# Patient Record
Sex: FEMALE | Race: WHITE | Employment: OTHER | URBAN - METROPOLITAN AREA
[De-identification: names, ages, dates, MRNs, and addresses within clinical notes are randomized per-mention and may not be internally consistent; named-entity substitution may affect disease eponyms.]

---

## 2017-01-11 ENCOUNTER — OFFICE VISIT (OUTPATIENT)
Dept: ORTHOPEDIC SURGERY | Age: 62
End: 2017-01-11

## 2017-01-11 VITALS — WEIGHT: 124 LBS | BODY MASS INDEX: 21.17 KG/M2 | HEART RATE: 72 BPM | HEIGHT: 64 IN | RESPIRATION RATE: 16 BRPM

## 2017-01-11 DIAGNOSIS — M19.079 ARTHRITIS OF MIDFOOT: ICD-10-CM

## 2017-01-11 DIAGNOSIS — S92.902K NONUNION OF FOOT FRACTURE, LEFT: Primary | ICD-10-CM

## 2017-01-11 PROCEDURE — 99024 POSTOP FOLLOW-UP VISIT: CPT | Performed by: NURSE PRACTITIONER

## 2017-01-11 PROCEDURE — 73630 X-RAY EXAM OF FOOT: CPT | Performed by: NURSE PRACTITIONER

## 2017-02-24 ENCOUNTER — OFFICE VISIT (OUTPATIENT)
Dept: ORTHOPEDIC SURGERY | Age: 62
End: 2017-02-24

## 2017-02-24 VITALS — BODY MASS INDEX: 21.17 KG/M2 | WEIGHT: 124 LBS | RESPIRATION RATE: 15 BRPM | HEIGHT: 64 IN

## 2017-02-24 DIAGNOSIS — S92.355A CLOSED NONDISPLACED FRACTURE OF FIFTH METATARSAL BONE OF LEFT FOOT, INITIAL ENCOUNTER: Primary | ICD-10-CM

## 2017-02-24 PROCEDURE — 99213 OFFICE O/P EST LOW 20 MIN: CPT | Performed by: ORTHOPAEDIC SURGERY

## 2017-02-24 PROCEDURE — 73630 X-RAY EXAM OF FOOT: CPT | Performed by: ORTHOPAEDIC SURGERY

## 2017-06-01 ENCOUNTER — HOSPITAL ENCOUNTER (OUTPATIENT)
Dept: WOMENS IMAGING | Age: 62
Discharge: HOME | End: 2017-06-01
Attending: SURGERY | Admitting: SURGERY

## 2017-06-01 DIAGNOSIS — M50.30 OTHER CERVICAL DISC DEGENERATION, UNSPECIFIED CERVICAL REGION: ICD-10-CM

## 2017-06-01 DIAGNOSIS — Z12.31 VISIT FOR SCREENING MAMMOGRAM: ICD-10-CM

## 2017-08-16 ENCOUNTER — OFFICE VISIT (OUTPATIENT)
Dept: ORTHOPEDIC SURGERY | Age: 62
End: 2017-08-16

## 2017-08-16 VITALS
HEART RATE: 72 BPM | DIASTOLIC BLOOD PRESSURE: 74 MMHG | SYSTOLIC BLOOD PRESSURE: 115 MMHG | WEIGHT: 124 LBS | HEIGHT: 64 IN | BODY MASS INDEX: 21.17 KG/M2

## 2017-08-16 DIAGNOSIS — M20.22 HALLUX RIGIDUS OF LEFT FOOT: Primary | ICD-10-CM

## 2017-08-16 DIAGNOSIS — M79.672 LEFT FOOT PAIN: ICD-10-CM

## 2017-08-16 PROCEDURE — 20600 DRAIN/INJ JOINT/BURSA W/O US: CPT | Performed by: ORTHOPAEDIC SURGERY

## 2017-08-16 PROCEDURE — 99213 OFFICE O/P EST LOW 20 MIN: CPT | Performed by: ORTHOPAEDIC SURGERY

## 2017-08-20 PROBLEM — M20.22 HALLUX RIGIDUS OF LEFT FOOT: Status: ACTIVE | Noted: 2017-08-20

## 2017-09-18 ENCOUNTER — OFFICE VISIT (OUTPATIENT)
Dept: ORTHOPEDIC SURGERY | Age: 62
End: 2017-09-18

## 2017-09-18 VITALS
DIASTOLIC BLOOD PRESSURE: 74 MMHG | WEIGHT: 124 LBS | HEIGHT: 64 IN | HEART RATE: 81 BPM | BODY MASS INDEX: 21.17 KG/M2 | TEMPERATURE: 99 F | SYSTOLIC BLOOD PRESSURE: 119 MMHG | RESPIRATION RATE: 16 BRPM

## 2017-09-18 DIAGNOSIS — S92.351A JONES FRACTURE, RIGHT, CLOSED, INITIAL ENCOUNTER: ICD-10-CM

## 2017-09-18 DIAGNOSIS — M79.671 RIGHT FOOT PAIN: Primary | ICD-10-CM

## 2017-09-18 PROCEDURE — 99213 OFFICE O/P EST LOW 20 MIN: CPT | Performed by: PHYSICIAN ASSISTANT

## 2017-09-19 ENCOUNTER — OFFICE VISIT (OUTPATIENT)
Dept: ORTHOPEDIC SURGERY | Age: 62
End: 2017-09-19

## 2017-09-19 VITALS
HEIGHT: 64 IN | RESPIRATION RATE: 16 BRPM | SYSTOLIC BLOOD PRESSURE: 135 MMHG | WEIGHT: 124 LBS | HEART RATE: 83 BPM | BODY MASS INDEX: 21.17 KG/M2 | DIASTOLIC BLOOD PRESSURE: 88 MMHG

## 2017-09-19 DIAGNOSIS — S92.354A CLOSED NONDISPLACED FRACTURE OF FIFTH METATARSAL BONE OF RIGHT FOOT, INITIAL ENCOUNTER: Primary | ICD-10-CM

## 2017-09-19 PROBLEM — S99.199A JONES FRACTURE: Status: ACTIVE | Noted: 2017-09-19

## 2017-09-19 PROCEDURE — 99214 OFFICE O/P EST MOD 30 MIN: CPT | Performed by: ORTHOPAEDIC SURGERY

## 2017-09-19 RX ORDER — CEPHALEXIN 500 MG/1
500 CAPSULE ORAL 4 TIMES DAILY
Qty: 20 CAPSULE | Refills: 0 | Status: SHIPPED | OUTPATIENT
Start: 2017-09-19 | End: 2017-10-13 | Stop reason: ALTCHOICE

## 2017-09-19 RX ORDER — HYDROCODONE BITARTRATE AND ACETAMINOPHEN 5; 325 MG/1; MG/1
1 TABLET ORAL EVERY 4 HOURS PRN
Qty: 20 TABLET | Refills: 0 | Status: SHIPPED | OUTPATIENT
Start: 2017-09-19 | End: 2017-10-13 | Stop reason: ALTCHOICE

## 2017-09-21 ENCOUNTER — HOSPITAL ENCOUNTER (OUTPATIENT)
Dept: SURGERY | Age: 62
Discharge: HOME | End: 2017-09-21
Attending: ORTHOPAEDIC SURGERY | Admitting: ORTHOPAEDIC SURGERY

## 2017-09-21 VITALS
WEIGHT: 125.9 LBS | SYSTOLIC BLOOD PRESSURE: 125 MMHG | BODY MASS INDEX: 21.49 KG/M2 | HEIGHT: 64 IN | RESPIRATION RATE: 16 BRPM | HEART RATE: 73 BPM | TEMPERATURE: 97.5 F | OXYGEN SATURATION: 99 % | DIASTOLIC BLOOD PRESSURE: 77 MMHG

## 2017-09-21 DIAGNOSIS — R52 PAIN: ICD-10-CM

## 2017-09-21 LAB
ANION GAP SERPL CALCULATED.3IONS-SCNC: 12 MMOL/L (ref 3–16)
BUN BLDV-MCNC: 12 MG/DL (ref 7–20)
CALCIUM SERPL-MCNC: 9.1 MG/DL (ref 8.3–10.6)
CHLORIDE BLD-SCNC: 96 MMOL/L (ref 99–110)
CO2: 27 MMOL/L (ref 21–32)
CREAT SERPL-MCNC: 0.7 MG/DL (ref 0.6–1.2)
GFR AFRICAN AMERICAN: >60
GFR NON-AFRICAN AMERICAN: >60
GLUCOSE BLD-MCNC: 100 MG/DL (ref 70–99)
HCT VFR BLD CALC: 36 % (ref 36–48)
HEMOGLOBIN: 12.3 G/DL (ref 12–16)
MCH RBC QN AUTO: 30.6 PG (ref 26–34)
MCHC RBC AUTO-ENTMCNC: 34.1 G/DL (ref 31–36)
MCV RBC AUTO: 89.9 FL (ref 80–100)
PDW BLD-RTO: 14.3 % (ref 12.4–15.4)
PLATELET # BLD: 356 K/UL (ref 135–450)
PMV BLD AUTO: 7.3 FL (ref 5–10.5)
POTASSIUM SERPL-SCNC: 4.2 MMOL/L (ref 3.5–5.1)
RBC # BLD: 4 M/UL (ref 4–5.2)
SODIUM BLD-SCNC: 135 MMOL/L (ref 136–145)
WBC # BLD: 4.4 K/UL (ref 4–11)

## 2017-09-21 PROCEDURE — 28485 OPTX METATARSAL FX EACH: CPT | Performed by: ORTHOPAEDIC SURGERY

## 2017-09-21 RX ORDER — ONDANSETRON 2 MG/ML
4 INJECTION INTRAMUSCULAR; INTRAVENOUS
Status: ACTIVE | OUTPATIENT
Start: 2017-09-21 | End: 2017-09-21

## 2017-09-21 RX ORDER — FENTANYL CITRATE 50 UG/ML
25 INJECTION, SOLUTION INTRAMUSCULAR; INTRAVENOUS EVERY 5 MIN PRN
Status: DISCONTINUED | OUTPATIENT
Start: 2017-09-21 | End: 2017-09-22 | Stop reason: HOSPADM

## 2017-09-21 RX ORDER — LABETALOL HYDROCHLORIDE 5 MG/ML
5 INJECTION, SOLUTION INTRAVENOUS EVERY 10 MIN PRN
Status: DISCONTINUED | OUTPATIENT
Start: 2017-09-21 | End: 2017-09-22 | Stop reason: HOSPADM

## 2017-09-21 RX ORDER — HYDROMORPHONE HCL 110MG/55ML
0.5 PATIENT CONTROLLED ANALGESIA SYRINGE INTRAVENOUS EVERY 5 MIN PRN
Status: DISCONTINUED | OUTPATIENT
Start: 2017-09-21 | End: 2017-09-22 | Stop reason: HOSPADM

## 2017-09-21 RX ORDER — HYDROCODONE BITARTRATE AND ACETAMINOPHEN 5; 325 MG/1; MG/1
TABLET ORAL
Status: DISCONTINUED
Start: 2017-09-21 | End: 2017-09-22 | Stop reason: HOSPADM

## 2017-09-21 RX ORDER — OXYCODONE HYDROCHLORIDE AND ACETAMINOPHEN 5; 325 MG/1; MG/1
2 TABLET ORAL PRN
Status: ACTIVE | OUTPATIENT
Start: 2017-09-21 | End: 2017-09-21

## 2017-09-21 RX ORDER — HYDROCODONE BITARTRATE AND ACETAMINOPHEN 5; 325 MG/1; MG/1
1 TABLET ORAL EVERY 6 HOURS PRN
Status: DISCONTINUED | OUTPATIENT
Start: 2017-09-21 | End: 2017-09-22 | Stop reason: HOSPADM

## 2017-09-21 RX ORDER — SODIUM CHLORIDE 9 MG/ML
INJECTION, SOLUTION INTRAVENOUS CONTINUOUS
Status: DISCONTINUED | OUTPATIENT
Start: 2017-09-21 | End: 2017-09-22 | Stop reason: HOSPADM

## 2017-09-21 RX ORDER — MEPERIDINE HYDROCHLORIDE 25 MG/ML
12.5 INJECTION INTRAMUSCULAR; INTRAVENOUS; SUBCUTANEOUS EVERY 5 MIN PRN
Status: DISCONTINUED | OUTPATIENT
Start: 2017-09-21 | End: 2017-09-22 | Stop reason: HOSPADM

## 2017-09-21 RX ORDER — CEFAZOLIN SODIUM 2 G/100ML
2 INJECTION, SOLUTION INTRAVENOUS
Status: COMPLETED | OUTPATIENT
Start: 2017-09-21 | End: 2017-09-21

## 2017-09-21 RX ORDER — DIPHENHYDRAMINE HYDROCHLORIDE 50 MG/ML
12.5 INJECTION INTRAMUSCULAR; INTRAVENOUS
Status: ACTIVE | OUTPATIENT
Start: 2017-09-21 | End: 2017-09-21

## 2017-09-21 RX ORDER — OXYCODONE HYDROCHLORIDE AND ACETAMINOPHEN 5; 325 MG/1; MG/1
1 TABLET ORAL PRN
Status: DISPENSED | OUTPATIENT
Start: 2017-09-21 | End: 2017-09-21

## 2017-09-21 RX ADMIN — HYDROCODONE BITARTRATE AND ACETAMINOPHEN 1 TABLET: 5; 325 TABLET ORAL at 12:30

## 2017-09-21 RX ADMIN — CEFAZOLIN SODIUM 2 G: 2 INJECTION, SOLUTION INTRAVENOUS at 10:13

## 2017-09-21 RX ADMIN — SODIUM CHLORIDE: 9 INJECTION, SOLUTION INTRAVENOUS at 09:24

## 2017-09-21 ASSESSMENT — PAIN SCALES - GENERAL: PAINLEVEL_OUTOF10: 5

## 2017-10-04 ENCOUNTER — OFFICE VISIT (OUTPATIENT)
Dept: ORTHOPEDIC SURGERY | Age: 62
End: 2017-10-04

## 2017-10-04 VITALS — BODY MASS INDEX: 21.34 KG/M2 | WEIGHT: 125 LBS | HEIGHT: 64 IN

## 2017-10-04 DIAGNOSIS — Z98.890 S/P FOOT SURGERY, RIGHT: Primary | ICD-10-CM

## 2017-10-04 DIAGNOSIS — S92.351A JONES FRACTURE, RIGHT, CLOSED, INITIAL ENCOUNTER: ICD-10-CM

## 2017-10-04 PROCEDURE — 99024 POSTOP FOLLOW-UP VISIT: CPT | Performed by: ORTHOPAEDIC SURGERY

## 2017-10-04 NOTE — LETTER
been non weightbearing in a splint. Rates pain a 0-1/10 VAS mild, aching, intermittent and are improving. Pain is worse with activity and better with rest and elevation.  She denies any sharp pain.  No fever or chills.       PHYSICAL EXAMINATION:     All the incisions are healed nicely.  No signs of any erythema or drainage.  She  has minimal to no swelling. No tenderness and no pain with ankle ROM. She has intact sensation in the right foot. .    IMAGING:    Three views of the right foot taken today in the office were reviewed.  These demonstrate that the 5th MT base fracture in good position, screw in place, no hardware failure.          IMPRESSION:  2 weeks out from right 5th MT base Magallanes fracture, s/p ORIF, and doing well. PLAN:  She will be partial weightbearing on the heel in shoe  She will come back in 6 weeks.  At that time, we will  get x-rays, 3 views of the right foot standing. Loretta Torres, ZOEY  If you have questions, please do not hesitate to call me. I look forward to following Al[facility] [place] along with you.     Sincerely,        Francisco J Sarah MD

## 2017-10-04 NOTE — MR AVS SNAPSHOT
After Visit Summary             Kiran Hopkins   10/4/2017 1:30 PM   Office Visit    Description:  Female : 1955   Provider:  Lisbeth Garcia MD   Department:  [address] [facility]              Your Follow-Up and Future Appointments         Below is a list of your follow-up and future appointments. This may not be a complete list as you may have made appointments directly with providers that we are not aware of or your providers may have made some for you. Please call your providers to confirm appointments. It is important to keep your appointments. Please bring your current insurance card, photo ID, co-pay, and all medication bottles to your appointment. If self-pay, payment is expected at the time of service. Your To-Do List     Future Appointments Provider Department Dept Phone    10/9/2017 2:00 PM Imelda Alamo MD [address] [facility] [phone]    Please arrive 15 minutes prior to appointment time, bring insurance card and photo ID.     2017 10:45 AM Zi Carlos MD [address] [facility] [phone]    Please arrive 15 minutes prior to appointment time, bring insurance card and photo ID.           Information from Your Visit        Department     Name Address Phone Fax    [address] [facility] [facility]) [address] [place] [address] Corwin Cain [address]. [phone]      You Were Seen for:         Comments    S/P foot surgery, right   [[id number]]         Vital Signs     Height Weight Body Mass Index Smoking Status          5' 4\" (1.626 m) 125 lb (56.7 kg) 21.46 kg/m2 Never Smoker           Medications and Orders      Your Current Medications Are              cephALEXin (KEFLEX) 500 MG capsule Take 1 capsule by mouth 4 times daily    HYDROcodone-acetaminophen (NORCO) 5-325 MG per tablet Take 1 tablet by Pap Smear 9/2/1975    Cholesterol Screening 9/2/1994    Colonoscopy 9/2/2004    Zoster Vaccine 9/2/2014    Yearly Flu Vaccine (1) 9/1/2017    Mammograms are recommended every 2 years for low/average risk patients aged 48 - 69, and every year for high risk patients per updated national guidelines. However these guidelines can be individualized by your provider. 6/1/2019            [employer] Signup           [employer] allows you to send messages to your doctor, view your test results, renew your prescriptions, schedule appointments, view visit notes, and more. How Do I Sign Up? 1. In your Internet browser, go to [email or web address]  2. Click on the Sign Up Now link in the Sign In box. You will see the New Member Sign Up page. 3. Enter your [facility] Access Code exactly as it appears below. You will not need to use this code after youve completed the sign-up process. If you do not sign up before the expiration date, you must request a new code. [employer] Access Code: [email or web address]  Expires: 10/15/2017  3:17 PM    4. Enter your Social Security Number ([id number]) and Date of Birth (mm/dd/yyyy) as indicated and click Submit. You will be taken to the next sign-up page. 5. Create a [facility]Doc ID. This will be your [facility] login ID and cannot be changed, so think of one that is secure and easy to remember. 6. Create a [facility] password. You can change your password at any time. 7. Enter your Password Reset Question and Answer. This can be used at a later time if you forget your password. 8. Enter your e-mail address. You will receive e-mail notification when new information is available in [address] 9. Click Sign Up. You can now view your medical record. Additional Information  If you have questions, please contact the physician practice where you receive care. Remember, [employer] is NOT to be used for urgent needs. For medical emergencies, dial 911.

## 2017-10-09 ENCOUNTER — OFFICE VISIT (OUTPATIENT)
Dept: ORTHOPEDIC SURGERY | Age: 62
End: 2017-10-09

## 2017-10-09 VITALS
WEIGHT: 125 LBS | BODY MASS INDEX: 21.34 KG/M2 | SYSTOLIC BLOOD PRESSURE: 133 MMHG | HEIGHT: 64 IN | RESPIRATION RATE: 16 BRPM | HEART RATE: 74 BPM | DIASTOLIC BLOOD PRESSURE: 77 MMHG

## 2017-10-09 DIAGNOSIS — M67.432 GANGLION CYST OF VOLAR ASPECT OF LEFT WRIST: Primary | ICD-10-CM

## 2017-10-09 PROCEDURE — 99214 OFFICE O/P EST MOD 30 MIN: CPT | Performed by: ORTHOPAEDIC SURGERY

## 2017-10-09 NOTE — COMMUNICATION BODY
DIAGNOSIS: Right 5th MT base Magallanes fracture, s/p ORIF with a intramedullary screw.       DATE OF SURGERY:  9/21/2017. HISTORY OF PRESENT ILLNESS:     Ms. Jayda Perdomo 60 y.o.  female 2 weeks out from her surgery.  She has been non weightbearing in a splint. Rates pain a 0-1/10 VAS mild, aching, intermittent and are improving. Pain is worse with activity and better with rest and elevation.  She denies any sharp pain.  No fever or chills.       PHYSICAL EXAMINATION:     All the incisions are healed nicely.  No signs of any erythema or drainage.  She  has minimal to no swelling. No tenderness and no pain with ankle ROM. She has intact sensation in the right foot. .    IMAGING:    Three views of the right foot taken today in the office were reviewed.  These demonstrate that the 5th MT base fracture in good position, screw in place, no hardware failure.          IMPRESSION:  2 weeks out from right 5th MT base Magallanes fracture, s/p ORIF, and doing well. PLAN:  She will be partial weightbearing on the heel in shoe  She will come back in 6 weeks.  At that time, we will  get x-rays, 3 views of the right foot standing. Nerige Mueller, CNPDIAGNOSIS: Right 5th MT base Magallanes fracture, s/p ORIF with a intramedullary screw.       DATE OF SURGERY:  9/21/2017. HISTORY OF PRESENT ILLNESS:     Ms. Jayda Perdomo 60 y.o.  female 2 weeks out from her surgery.  She has been non weightbearing in a splint. Rates pain a 0-1/10 VAS mild, aching, intermittent and are improving. Pain is worse with activity and better with rest and elevation.  She denies any sharp pain.  No fever or chills.       PHYSICAL EXAMINATION:     All the incisions are healed nicely.  No signs of any erythema or drainage.  She  has minimal to no swelling. No tenderness and no pain with ankle ROM. She has intact sensation in the right foot.  .    IMAGING:    Three views of the right foot taken today in the office were reviewed.  These demonstrate that the 5th

## 2017-10-09 NOTE — PROGRESS NOTES
Ms. Husam Jaen returns today in follow-up of her previously treated  left Volar wrist ganglion cyst.  She was last seen in October, 2016 at which time she was treated with Conservative Measures. She experienced minimal relief of her initial symptoms. She  has noticed symptom worsening over the last several months. She returns today with Worsened symptoms of left Volar wrist, requesting further treatment. She also notes residual weakness and pain in the right hand and forearm, she is status post right Carpal Tunnel Release and right Ulnar Nerve decompression at the Cubital Tunnel in October 2016. The patient's , past medical history, medications, allergies,  family history, social history, and review of systems have been reviewed and are recorded in the chart. Physical Exam:  Vitals  BP: 133/77  Pulse: 74  Resp: 16  Height: 5' 4\" (162.6 cm)  Weight: 125 lb (56.7 kg)  Ms. Husam Jean appears well, she is in no apparent distress, she demonstrates appropriate mood & affect. Skin: Skin color, texture, turgor normal. No rashes or lesions bilaterally  Digital range of motion is full and equal bilateral otherwise normal bilaterally  Wrist range of motion is full and equal bilateral otherwise normal bilaterally  There is no evidence of gross joint instability bilaterally. Sensation is subjectively tingling  in the Middle Finger, Ring Finger and Small Finger on the Right, normal on the Left and objectively normal in the same distribution bilaterally  Vascular examination reveals normal and good capillary refill bilaterally  Swelling is absent bilaterally  Muscular strength is clinically appropriate bilaterally. There is a palpable mass measuring approximately 12 mm in greatest dimension on the Volar surface of the left wrist.  Examination for Carpal Tunnel Syndrome shows Carpal Tunnel Compression Test to be negative bilaterally. Phalen's Maneuver is negative bilaterally.   The thenar musculature shows no evidence of atrophy or weakness. Examination for Cubital Tunnel Syndrome shows no tenderness to palpation at the medial & lateral epicondyles of the Humerus. The Ulnar Nerve rests securely behind the medial epicondyle without subluxation upon elbow flexion. Elbow flexion-compression test is negative, and there is no Tinnel's Sign over the Cubital Tunnel. The Ulnar Nerve innervated intrinsic musculature is without atrophy or weakness. Cervical Spine: Active Range of Motion  is Decreased with pain at extremes. Lateral bending does reproduce symptoms in the symptomatic extremity, Maximal rotation does reproduce symptoms in the symptomatic extremity. Impression:  Ms. Lakisha Kumar is showing evidence of persistent symptoms of her Ganglion Cyst after previous treatment. It appears that her residual right upper extremity neurologic symptoms are related to her cervical spine. She requests additional treatment at this time. Plan:  After discussion of the treatment options available for the Ganglion Cysts and review of her past treatments, I have outline for Ms. Lakisha Kumar the surgical treatment option of Ganglion Cyst excision. We have discussed the details of the surgical procedure and the pre, juliann and postoperative concerns and appropriate expectations after this surgical procedure. We specifically discussed the possibility of recurrence, stiffness and residual discomfort. She was given opportunity to ask questions and had them answered fully to her satisfaction. She voiced an understanding of the procedure and did wish to proceed with left Volar Ganglion Cyst excision. I had an extensive discussion with Ms. Lakisha Kumar and any family members present regarding the natural history, etiology, and long term consequences of this problem. I have outlined a treatment plan with them and, in my opinion, surgical intervention is indicated at this time.  I have discussed with them the potential complications, limitations, expectations, alternatives, and risks of left Volar Ganglion Cyst excision. She has had full opportunity to ask her questions. I have answered them all to her satisfaction. I feel that Ms. Leeanne Betancur  and any present family members do understand our discussion today and they have provided informed consent for left Volar Ganglion Cyst excision. I have also discussed with Ms. Leeanne Betancur the other treatment options available to her for this condition. We have today selected to proceed with Surgical treatment. She has voiced and  understanding that there are other less aggressive treatment options which are available in this situation, albeit possibly less efficacious or durable, and she is comfortable with the plan that she has chosen. I have explained to Ms. Leeanne Betancur that despite successful treatment (surgical or otherwise) of her current presenting condition, that due to her coexistent conditions (both diagnosed and undiagnosed), that she is likely to have some permanent residual symptoms related to these conditions that do not improve long term. I have also explained that maximal recovery of function & symptom improvement may take a full year or longer to realize. She voiced a clear understanding of this. With regard to her right neurologic symptoms:  As I do not find an etiology for her symptoms in my evaluation of her hand & wrist, I will refer Ms. Leeanne Betancur for evaluation of her cervical spine to see if there is an ongoing problem at that level which may explain her presenting complaints. Ms. Leeanne Betancur has been given a full verbal list of instructions and precautions related to her present condition. I have asked her to followup with me in the office at the prescribed time. She is also specifically requested to call or return to the office sooner if her symptoms change or worsen prior to the next scheduled appointment.

## 2017-10-09 NOTE — LETTER
[facility]  [address]  Phone: [phone]  Fax: [phone]    Juanito Prasad MD        October 11, 2017     [facility]  [facility] [id number]    Patient: Misha Love  MR Number: [id number]  YOB: 1955  Date of Visit: 10/9/2017    Dear Dr. Jaret Wong:    Thank you for the request for consultation for Albert Anisha to me for evaluation. Below are the relevant portions of my assessment and plan of care. Ms. Misha Love returns today in follow-up of her previously treated  left Volar wrist ganglion cyst.  She was last seen in October, 2016 at which time she was treated with Conservative Measures. She experienced minimal relief of her initial symptoms. She  has noticed symptom worsening over the last several months. She returns today with Worsened symptoms of left Volar wrist, requesting further treatment. She also notes residual weakness and pain in the right hand and forearm, she is status post right Carpal Tunnel Release and right Ulnar Nerve decompression at the Cubital Tunnel in October 2016. The patient's , past medical history, medications, allergies,  family history, social history, and review of systems have been reviewed and are recorded in the chart. Physical Exam:  Vitals  BP: 133/77  Pulse: 74  Resp: 16  Height: 5' 4\" (162.6 cm)  Weight: 125 lb (56.7 kg)  Ms. Misha Love appears well, she is in no apparent distress, she demonstrates appropriate mood & affect. Skin: Skin color, texture, turgor normal. No rashes or lesions bilaterally  Digital range of motion is full and equal bilateral otherwise normal bilaterally  Wrist range of motion is full and equal bilateral otherwise normal bilaterally  There is no evidence of gross joint instability bilaterally.   Sensation is subjectively tingling  in the Middle Finger, Ring Finger and As I do not find an etiology for her symptoms in my evaluation of her hand & wrist, I will refer Ms. Kiran Hopkins for evaluation of her cervical spine to see if there is an ongoing problem at that level which may explain her presenting complaints. Ms. Kiran Hopkins has been given a full verbal list of instructions and precautions related to her present condition. I have asked her to followup with me in the office at the prescribed time. She is also specifically requested to call or return to the office sooner if her symptoms change or worsen prior to the next scheduled appointment. If you have questions, please do not hesitate to call me. I look forward to following Marisela Sotelo along with you.     Sincerely,        Rae Brower MD

## 2017-10-09 NOTE — PATIENT INSTRUCTIONS
Pre-Operative Instructions    1. The night before your surgery, unless otherwise instructed, do not eat any food, drink any liquids, chew gum or mints after midnight. Abstain from alcohol for 24 hours prior to surgery. 2. You will be contacted by the Hospital the working day prior to your procedure to confirm your arrival time. 3. Patients under 25years of age must have a parent or legal guardian present to sign their consent and discharge paperwork. 4. On the day of surgery,  you will be seen pre-operatively by an anesthesiologist.     5. If you are having hand surgery, it is recommended that nail polish and acrylic nails be removed prior to surgery if possible. 6. Please bring cases for glasses, contact lenses, hearing aids or dentures. They will likely be removed prior to surgery. 7. Wear casual, loose-fitting and comfortable clothing. Consider that you may have a large dressing to fit under your clothing after surgery. 9. Please do not bring valuables such as jewelry or large sums of cash to the hospital. Remove all body piercings before coming to the hospital. Jose Ellis may not  wear any rings on the hand if you are having surgery on that hand, wrist or elbow. 10. Do not smoke or chew tobacco before your surgery. [address] [place] [address] and surgery facilities are smoke-free environments. Smoking is not permitted anywhere on campus. 11. Be sure to follow any additional instructions from your physician. If the above conditions are not met, your surgery may be cancelled and rescheduled for another day. Should you develop any change in your health such as fever, cough, sore throat, cold, flu, or infection, or if you have any questions regarding your Pre-admission or surgery, please contact [address] - Surgery Scheduling at [phone], Monday through Friday, 9 a.m. to 5 p.m.

## 2017-10-11 NOTE — COMMUNICATION BODY
Ms. Kasi Miranda returns today in follow-up of her previously treated  left Volar wrist ganglion cyst.  She was last seen in October, 2016 at which time she was treated with Conservative Measures. She experienced minimal relief of her initial symptoms. She  has noticed symptom worsening over the last several months. She returns today with Worsened symptoms of left Volar wrist, requesting further treatment. She also notes residual weakness and pain in the right hand and forearm, she is status post right Carpal Tunnel Release and right Ulnar Nerve decompression at the Cubital Tunnel in October 2016. The patient's , past medical history, medications, allergies,  family history, social history, and review of systems have been reviewed and are recorded in the chart. Physical Exam:  Vitals  BP: 133/77  Pulse: 74  Resp: 16  Height: 5' 4\" (162.6 cm)  Weight: 125 lb (56.7 kg)  Ms. Kasi Miranda appears well, she is in no apparent distress, she demonstrates appropriate mood & affect. Skin: Skin color, texture, turgor normal. No rashes or lesions bilaterally  Digital range of motion is full and equal bilateral otherwise normal bilaterally  Wrist range of motion is full and equal bilateral otherwise normal bilaterally  There is no evidence of gross joint instability bilaterally. Sensation is subjectively tingling  in the Middle Finger, Ring Finger and Small Finger on the Right, normal on the Left and objectively normal in the same distribution bilaterally  Vascular examination reveals normal and good capillary refill bilaterally  Swelling is absent bilaterally  Muscular strength is clinically appropriate bilaterally. There is a palpable mass measuring approximately 12 mm in greatest dimension on the Volar surface of the left wrist.  Examination for Carpal Tunnel Syndrome shows Carpal Tunnel Compression Test to be negative bilaterally. Phalen's Maneuver is negative bilaterally.   The thenar musculature shows no complications, limitations, expectations, alternatives, and risks of left Volar Ganglion Cyst excision. She has had full opportunity to ask her questions. I have answered them all to her satisfaction. I feel that Ms. Kasi Miranda  and any present family members do understand our discussion today and they have provided informed consent for left Volar Ganglion Cyst excision. I have also discussed with Ms. Kasi Miranda the other treatment options available to her for this condition. We have today selected to proceed with Surgical treatment. She has voiced and  understanding that there are other less aggressive treatment options which are available in this situation, albeit possibly less efficacious or durable, and she is comfortable with the plan that she has chosen. I have explained to Ms. Kasi Miranda that despite successful treatment (surgical or otherwise) of her current presenting condition, that due to her coexistent conditions (both diagnosed and undiagnosed), that she is likely to have some permanent residual symptoms related to these conditions that do not improve long term. I have also explained that maximal recovery of function & symptom improvement may take a full year or longer to realize. She voiced a clear understanding of this. With regard to her right neurologic symptoms:  As I do not find an etiology for her symptoms in my evaluation of her hand & wrist, I will refer Ms. Kasi Miranda for evaluation of her cervical spine to see if there is an ongoing problem at that level which may explain her presenting complaints. Ms. Kasi Miranda has been given a full verbal list of instructions and precautions related to her present condition. I have asked her to followup with me in the office at the prescribed time. She is also specifically requested to call or return to the office sooner if her symptoms change or worsen prior to the next scheduled appointment.

## 2017-10-13 ENCOUNTER — PAT TELEPHONE (OUTPATIENT)
Dept: PREADMISSION TESTING | Age: 62
End: 2017-10-13

## 2017-10-13 ENCOUNTER — SURG/PROC ORDERS (OUTPATIENT)
Dept: ANESTHESIOLOGY | Age: 62
End: 2017-10-13

## 2017-10-13 VITALS — HEIGHT: 64 IN | WEIGHT: 124 LBS | BODY MASS INDEX: 21.17 KG/M2

## 2017-10-13 RX ORDER — SODIUM CHLORIDE 0.9 % (FLUSH) 0.9 %
10 SYRINGE (ML) INJECTION PRN
Status: CANCELLED | OUTPATIENT
Start: 2017-10-13

## 2017-10-13 RX ORDER — SODIUM CHLORIDE 0.9 % (FLUSH) 0.9 %
10 SYRINGE (ML) INJECTION EVERY 12 HOURS SCHEDULED
Status: CANCELLED | OUTPATIENT
Start: 2017-10-13

## 2017-10-13 RX ORDER — SODIUM CHLORIDE 9 MG/ML
INJECTION, SOLUTION INTRAVENOUS CONTINUOUS
Status: CANCELLED | OUTPATIENT
Start: 2017-10-13

## 2017-10-13 NOTE — PRE-PROCEDURE INSTRUCTIONS
other children with you. For your comfort, please wear simple loose fitting clothing to the hospital.  Please do not bring valuables. Do not wear any make-up or nail polish on your fingers or toes      For your safety, please do not wear any jewelry or body piercing's on the day of surgery. All jewelry must be removed. If you have dentures, they will be removed before going to operating room. For your convenience, we will provide you with a container. If you wear contact lenses or glasses, they will be removed, please bring a case for them. If you have a living will and a durable power of  for healthcare, please bring in a copy. As part of our patient safety program to minimize surgical site infections, we ask you to do the following:    · Please notify your surgeon if you develop any illness between         now and the  day of your surgery. · This includes a cough, cold, fever, sore throat, nausea,         or vomiting, and diarrhea, etc.  ·  Please notify your surgeon if you experience dizziness, shortness         of breath or blurred vision between now and the time of your surgery. Do not shave your operative site 96 hours prior to surgery. For face and neck surgery, men may use an electric razor 48 hours   prior to surgery. You may shower the night before surgery or the morning of   your surgery with an antibacterial soap. You will need to bring a photo ID and insurance card    [facility] has an onsite pharmacy, would you like to utilize our pharmacy     If you will be staying overnight and use a C-pap machine, please bring   your C-pap to hospital     Our goal is to provide you with excellent care, therefore, visitors will be limited to two(2) in the room at a time so that we may focus on providing this care for you. Please contact pre-admission testing if you have any further questions.                  [facility] phone number:  [phone] [facility] [employer] PAT fax number:  [phone]  Please note these are generalized instructions for all surgical cases, you may be provided with more specific instructions according to your surgery.

## 2017-10-17 ENCOUNTER — HOSPITAL ENCOUNTER (OUTPATIENT)
Dept: SURGERY | Age: 62
Discharge: HOME | End: 2017-10-17
Attending: ORTHOPAEDIC SURGERY | Admitting: ORTHOPAEDIC SURGERY

## 2017-10-17 VITALS
DIASTOLIC BLOOD PRESSURE: 75 MMHG | SYSTOLIC BLOOD PRESSURE: 127 MMHG | OXYGEN SATURATION: 97 % | HEART RATE: 87 BPM | RESPIRATION RATE: 17 BRPM | TEMPERATURE: 97.6 F

## 2017-10-17 RX ORDER — MORPHINE SULFATE 2 MG/ML
2 INJECTION, SOLUTION INTRAMUSCULAR; INTRAVENOUS EVERY 5 MIN PRN
Status: DISCONTINUED | OUTPATIENT
Start: 2017-10-17 | End: 2017-10-18 | Stop reason: HOSPADM

## 2017-10-17 RX ORDER — SODIUM CHLORIDE 9 MG/ML
INJECTION, SOLUTION INTRAVENOUS CONTINUOUS
Status: DISCONTINUED | OUTPATIENT
Start: 2017-10-17 | End: 2017-10-18 | Stop reason: HOSPADM

## 2017-10-17 RX ORDER — SODIUM CHLORIDE 0.9 % (FLUSH) 0.9 %
10 SYRINGE (ML) INJECTION PRN
Status: DISCONTINUED | OUTPATIENT
Start: 2017-10-17 | End: 2017-10-18 | Stop reason: HOSPADM

## 2017-10-17 RX ORDER — MORPHINE SULFATE 2 MG/ML
1 INJECTION, SOLUTION INTRAMUSCULAR; INTRAVENOUS EVERY 5 MIN PRN
Status: DISCONTINUED | OUTPATIENT
Start: 2017-10-17 | End: 2017-10-18 | Stop reason: HOSPADM

## 2017-10-17 RX ORDER — FENTANYL CITRATE 50 UG/ML
25 INJECTION, SOLUTION INTRAMUSCULAR; INTRAVENOUS EVERY 5 MIN PRN
Status: DISCONTINUED | OUTPATIENT
Start: 2017-10-17 | End: 2017-10-18 | Stop reason: HOSPADM

## 2017-10-17 RX ORDER — OXYCODONE HYDROCHLORIDE AND ACETAMINOPHEN 5; 325 MG/1; MG/1
2 TABLET ORAL PRN
Status: ACTIVE | OUTPATIENT
Start: 2017-10-17 | End: 2017-10-17

## 2017-10-17 RX ORDER — MEPERIDINE HYDROCHLORIDE 25 MG/ML
12.5 INJECTION INTRAMUSCULAR; INTRAVENOUS; SUBCUTANEOUS EVERY 5 MIN PRN
Status: DISCONTINUED | OUTPATIENT
Start: 2017-10-17 | End: 2017-10-18 | Stop reason: HOSPADM

## 2017-10-17 RX ORDER — FENTANYL CITRATE 50 UG/ML
50 INJECTION, SOLUTION INTRAMUSCULAR; INTRAVENOUS EVERY 5 MIN PRN
Status: DISCONTINUED | OUTPATIENT
Start: 2017-10-17 | End: 2017-10-18 | Stop reason: HOSPADM

## 2017-10-17 RX ORDER — ONDANSETRON 2 MG/ML
4 INJECTION INTRAMUSCULAR; INTRAVENOUS
Status: ACTIVE | OUTPATIENT
Start: 2017-10-17 | End: 2017-10-17

## 2017-10-17 RX ORDER — OXYCODONE HYDROCHLORIDE AND ACETAMINOPHEN 5; 325 MG/1; MG/1
1 TABLET ORAL PRN
Status: ACTIVE | OUTPATIENT
Start: 2017-10-17 | End: 2017-10-17

## 2017-10-17 RX ORDER — SODIUM CHLORIDE 0.9 % (FLUSH) 0.9 %
10 SYRINGE (ML) INJECTION EVERY 12 HOURS SCHEDULED
Status: DISCONTINUED | OUTPATIENT
Start: 2017-10-17 | End: 2017-10-18 | Stop reason: HOSPADM

## 2017-10-17 RX ADMIN — SODIUM CHLORIDE: 9 INJECTION, SOLUTION INTRAVENOUS at 10:56

## 2017-10-17 ASSESSMENT — PAIN SCALES - GENERAL
PAINLEVEL_OUTOF10: 0
PAINLEVEL_OUTOF10: 0

## 2017-10-17 ASSESSMENT — PAIN DESCRIPTION - PAIN TYPE: TYPE: SURGICAL PAIN

## 2017-10-17 NOTE — ANESTHESIA POST-OP
[facility] Department of Anesthesiology  Post-Anesthesia Note       Name:  Casey Davies                                         Age:  60 y.o.   MRN:  [id number]     Last Vitals & Oxygen Saturation: /75   Pulse 87   Temp 97.6 °F (36.4 °C) (Temporal)   Resp 17   SpO2 97%   Patient Vitals for the past 4 hrs:   BP Temp Temp src Pulse Resp SpO2   10/17/17 1302 127/75 - - 87 17 97 %   10/17/17 1238 123/70 97.6 °F (36.4 °C) Temporal 81 14 94 %   10/17/17 1227 - 97.6 °F (36.4 °C) Temporal 80 15 100 %   10/17/17 1222 108/68 - - 77 13 98 %   10/17/17 1217 104/60 - - 79 12 98 %   10/17/17 1211 (!) 79/47 - - 64 (!) 6 98 %   10/17/17 1207 (!) 78/46 - - 63 (!) 5 98 %   10/17/17 1202 (!) 79/48 98 °F (36.7 °C) Temporal 56 8 100 %       Level of consciousness: awake, alert and oriented    Respiratory: stable     Cardiovascular: stable     Hydration: stable     PONV: stable     Post-op pain: adequate analgesia    Post-op assessment: no apparent anesthetic complications and tolerated procedure well    Complications:  none    Hernandez Yates MD  October 17, 2017   3:18 PM

## 2017-10-17 NOTE — ANESTHESIA PRE-OP
foraminotomy     Microdissection using the operating room microscope. 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 Neena Stephenson       Social History   Substance Use Topics    Smoking status: Never Smoker    Smokeless tobacco: Never Used    Alcohol use No     Medications  Current Outpatient Prescriptions on File Prior to Encounter   Medication Sig Dispense Refill    Turmeric POWD by Does not apply route      citalopram (CELEXA) 20 MG tablet Take 10 mg by mouth daily       amlodipine (NORVASC) 5 MG tablet Take 10 mg by mouth daily       lisinopril (PRINIVIL;ZESTRIL) 40 MG tablet Take 40 mg by mouth daily.  aspirin 81 MG tablet Take 81 mg by mouth daily. No current facility-administered medications on file prior to encounter. Current Outpatient Prescriptions   Medication Sig Dispense Refill    Turmeric POWD by Does not apply route      citalopram (CELEXA) 20 MG tablet Take 10 mg by mouth daily       amlodipine (NORVASC) 5 MG tablet Take 10 mg by mouth daily       lisinopril (PRINIVIL;ZESTRIL) 40 MG tablet Take 40 mg by mouth daily.  aspirin 81 MG tablet Take 81 mg by mouth daily. No current facility-administered medications for this encounter. Vital Signs (Current) There were no vitals filed for this visit. Vital Signs Statistics (for past 48 hrs)     No Data Recorded    BP Readings from Last 3 Encounters:   10/09/17 133/77   09/21/17 125/77   09/19/17 135/88     BMI  There is no height or weight on file to calculate BMI.   Estimated body mass index is 21.28 kg/m² as calculated from the following:    Height as of 10/13/17: 5' 4\" (1.626 m). Weight as of 10/13/17: 124 lb (56.2 kg). CBC   Lab Results   Component Value Date    WBC 4.4 09/21/2017    RBC 4.00 09/21/2017    HGB 12.3 09/21/2017    HCT 36.0 09/21/2017    MCV 89.9 09/21/2017    RDW 14.3 09/21/2017     09/21/2017     CMP    Lab Results   Component Value Date     09/21/2017    K 4.2 09/21/2017    CL 96 09/21/2017    CO2 27 09/21/2017    BUN 12 09/21/2017    CREATININE 0.7 09/21/2017    GFRAA >60 09/21/2017    LABGLOM >60 09/21/2017    GLUCOSE 100 09/21/2017    CALCIUM 9.1 09/21/2017     BMP    Lab Results   Component Value Date     09/21/2017    K 4.2 09/21/2017    CL 96 09/21/2017    CO2 27 09/21/2017    BUN 12 09/21/2017    CREATININE 0.7 09/21/2017    CALCIUM 9.1 09/21/2017    GFRAA >60 09/21/2017    LABGLOM >60 09/21/2017    GLUCOSE 100 09/21/2017     POCGlucose  No results for input(s): GLUCOSE in the last 72 hours. Coags    Lab Results   Component Value Date    PROTIME 9.8 10/01/2015    INR 0.91 33/92/5121     HCG (If Applicable) No results found for: PREGTESTUR, PREGSERUM, HCG, HCGQUANT   ABGs No results found for: PHART, PO2ART, AWE9RDY, OQB0TYE, BEART, C2UIIKOQ   Type & Screen (If Applicable)  No results found for: [facility]    Surgeon:    Procedure:    Medications prior to admission:   Prior to Admission medications    Medication Sig Start Date End Date Taking? Authorizing Provider   Turmeric POWD by Does not apply route    Historical Provider, MD   citalopram (CELEXA) 20 MG tablet Take 10 mg by mouth daily     Historical Provider, MD   amlodipine (NORVASC) 5 MG tablet Take 10 mg by mouth daily  1/30/10   Historical Provider, MD   lisinopril (PRINIVIL;ZESTRIL) 40 MG tablet Take 40 mg by mouth daily. Historical Provider, MD   aspirin 81 MG tablet Take 81 mg by mouth daily.     Historical Provider, MD       Current medications:    Current Outpatient Prescriptions   Medication Sig Dispense Refill    Turmeric POWD by Does not apply route      citalopram (CELEXA) 20 MG tablet Take 10 mg by mouth daily       amlodipine (NORVASC) 5 MG tablet Take 10 mg by mouth daily       lisinopril (PRINIVIL;ZESTRIL) 40 MG tablet Take 40 mg by mouth daily.  aspirin 81 MG tablet Take 81 mg by mouth daily. No current facility-administered medications for this encounter. 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    Cubital tunnel syndrome on right G56.21    Closed nondisplaced fracture of metatarsal bone of left foot with nonunion S92.302K    Hallux rigidus of left foot M20.22    Closed nondisplaced fracture of fifth metatarsal bone of right foot S92.354A    Magallanes fracture S99.199A    Ganglion cyst of volar aspect of left wrist M67.432       Past Medical History:        Diagnosis Date    Arthritis     osteoporosis    Closed fracture of metatarsal bone(s) 7/10/09    Left foot 5th metatarsal tuberosity fx with delayed union    Depression     prescribed after death of spouse--5 years ago   [address] [place] [address] HBP (high blood pressure)     History of blood transfusion     in child bearing years    S/P insertion of spinal cord stimulator     Spinal cord stimulator status     lower back--instructed to bring ID card DOS/pt states usually keeps it off       Past Surgical History:        Procedure Laterality Date    BACK SURGERY  2007    lumbar fusion and cage    BACK SURGERY  10/03/2016

## 2017-10-17 NOTE — OP NOTE
OPERATIVE REPORT              . Patient:  Kortney Hernandez    YOB: 1955  Date of Service:  10/17/2017   Location:  [facility]      Preoperative Diagnosis:   Left wrist volar ganglion cyst    Postoperative Diagnosis:  Same    Procedure:  Left wrist volar ganglion cyst excision    Surgeon:  Panchito Ruiz. Amaya Kern MD    Anesthesia:  General.    Blood Loss:  Minimal.     Complications:  None. Tourniquet Time:  7 minutes. Indications:  Ms. Kortney Hernandez is a 60y.o. year old female with a Left volar wrist mass which has been symptomatic. Having failed conservative treatment, I have discussed preoperatively with her the complications, limitations, expectations, alternatives and risks of surgical care which he understood. All of her questions have been fully answered, and Ms. Kortney Hernandez has provided written informed consent to proceed. After written consent was obtained and the proper operative site was identified and marked Ms. Kortney Hernandez  was brought to the operating room placed in the supine position on the operating room table with the Left arm extended upon a hand table. General anesthesia was administered by the anesthesia service and the Left upper extremity was prepped and draped in the usual sterile fashion. Procedure:  After Esmarch exsanguination the pneumo-tourniquet was inflated to 250 millimeters of mercury. A Chevron type incision was fashioned overlying the volar wrist mass. Dissection was very carefully carried through the subcutaneous tissues identifying and protecting the neurovascular structures. The volar skin flaps were raised and the cyst was clearly identified. The radial artery was immediately adjacent to the cyst and was very carefully dissected free, taking great care to protect the artery and its associated veins throughout the procedure.   With the artery securely protected, the cyst was circumferentially dissected free of the surrounding soft tissues and the stalk of the cyst was found to be emanating from the volar wrist joint capsule. The cyst, stalk and a small cuff of normal capsular material was excised. The wound was inspected and found to be free of any residual cyst or stalk. The origin of the stalk was treated with electrocautery and the wound irrigated copiously with sterile saline for irrigation. The wound edges were infiltrated with local anesthetic for postoperative analgesia and the pneumo-tourniquet deflated after a period of 7 minutes elevation, the fingers were immediately pink and well perfused. Hemostasis was easily obtained with direct pressure and electrocautery. The skin was closed with interrupted absorbable sutures and the wound was dressed with adaptic, dry sterile dressings, and a very well padded short arm volar splint. The patient was awakened from anesthesia, having tolerated the procedure without apparent complication and was returned to the recovery room in stable condition. At the conclusion of the procedure all needle, instrument, and sponge counts were correct. Humera Donald MD   10/17/2017 , 11:58 AM

## 2017-10-20 NOTE — ADDENDUM NOTE
Encounter addended by: Helena Leventhal, MD on: 10/20/2017  8:24 AM<BR>    Actions taken: Letter status changed

## 2017-10-23 ENCOUNTER — OFFICE VISIT (OUTPATIENT)
Dept: ORTHOPEDIC SURGERY | Age: 62
End: 2017-10-23

## 2017-10-23 ENCOUNTER — HOSPITAL ENCOUNTER (OUTPATIENT)
Dept: WOMENS IMAGING | Age: 62
Discharge: HOME | End: 2017-10-23
Attending: INTERNAL MEDICINE | Admitting: INTERNAL MEDICINE

## 2017-10-23 VITALS — HEIGHT: 64 IN | RESPIRATION RATE: 16 BRPM | WEIGHT: 125 LBS | BODY MASS INDEX: 21.34 KG/M2

## 2017-10-23 DIAGNOSIS — Z13.820 OSTEOPOROSIS SCREENING: ICD-10-CM

## 2017-10-23 DIAGNOSIS — M50.30 OTHER CERVICAL DISC DEGENERATION, UNSPECIFIED CERVICAL REGION: ICD-10-CM

## 2017-10-23 DIAGNOSIS — M67.432 GANGLION CYST OF VOLAR ASPECT OF LEFT WRIST: Primary | ICD-10-CM

## 2017-10-23 PROCEDURE — 99024 POSTOP FOLLOW-UP VISIT: CPT | Performed by: PHYSICIAN ASSISTANT

## 2017-10-23 NOTE — PROGRESS NOTES
Ms. Rome Taylor returns today in follow-up of her recent left Volar Ganglion Cyst Excision done approximately 1 week ago. She has done well noting mild discomfort and no other reported complications. She notes pre-operative symptoms to be Improved at this time. Physical Exam:  Skin incision is healing well, no significant drainage, no dehiscence. Digital range of motion is full and equal bilateral.  Wrist range of motion is mildly stiff from postoperative splint. Sensation is normal in the Whole Hand. Vascular examination reveals normal, good capillary refill and good color. Swelling is mild around incision. There is no clinical evidence of mass recurrence. Impression:  Ms. Rome Taylor is doing well after recent left Volar Ganglion Cyst Excision. Plan:  Ms. Rome Taylor is instructed in work on Active & Passive range of motion of the digits, wrist, & elbow. These modalities were specifically demonstrated to her today. We discussed the appropriateness of gradual resumption of use of the operated hand and the return to normal use as comfort allows. She is given instructions regarding management of the fresh surgical incision and progressive use of desensitization and tissue massage techniques. We discussed the appropriate expectations and timeline for symptom improvement. She is provided a written patient instruction sheet titled: Postoperative Instructions After Ganglion Cyst Excision. I have asked Ms. Rome Taylor to follow-up with me, either by scheduling an appointment for approximately 2-4 weeks from now, or by contacting me by telephone over the next 2-4 weeks if her symptoms have not fully resolved or if she has not regained full & painless return of function. She is also specifically instructed to return to the office or call for an appointment sooner if her symptoms are changing or worsening prior to that time.

## 2017-11-15 ENCOUNTER — OFFICE VISIT (OUTPATIENT)
Dept: ORTHOPEDIC SURGERY | Age: 62
End: 2017-11-15

## 2017-11-15 VITALS
BODY MASS INDEX: 21.46 KG/M2 | SYSTOLIC BLOOD PRESSURE: 129 MMHG | WEIGHT: 125 LBS | HEART RATE: 82 BPM | DIASTOLIC BLOOD PRESSURE: 85 MMHG

## 2017-11-15 DIAGNOSIS — S92.354A CLOSED NONDISPLACED FRACTURE OF FIFTH METATARSAL BONE OF RIGHT FOOT, INITIAL ENCOUNTER: Primary | ICD-10-CM

## 2017-11-15 PROCEDURE — 99024 POSTOP FOLLOW-UP VISIT: CPT | Performed by: ORTHOPAEDIC SURGERY

## 2017-11-15 NOTE — LETTER
[facility]  [id number]  Phone: [phone]  Fax: [phone]    Deidra Roberts MD        November 28, 2017     BESS Heredia MD  [facility] [phone] [id number]    Patient: Johnita Cockayne  MR Number: [id number]  YOB: 1955  Date of Visit: 11/15/2017    Dear Dr. Cullen Sagastume:    Thank you for the request for consultation for Roby Loredo to me for  evaluation. Below are the relevant portions of my assessment and plan of care. DIAGNOSIS: Right 5th MT base Magallanes fracture, s/p ORIF with a intramedullary screw. DATE OF SURGERY:  9/21/2017. HISTORY OF PRESENT ILLNESS:     Johnita Cockayne 60 y.o.  female 2 months out from her surgery. She has been full weightbearing in regular shoes 0/10 VAS. She denies any sharp pain. No fever or chills. PHYSICAL EXAMINATION:     The incision healed nicely. No signs of any erythema or drainage. She  has no swelling. No tenderness and no pain with ankle ROM. She has intact sensation in the right foot. .    IMAGING:    Three views of the right foot taken today in the office were reviewed. These demonstrate that the 5th MT base fracture in good position, healing well, screw in place, no hardware failure. IMPRESSION:  2 months out from right 5th MT base Magallanes fracture, s/p ORIF, and doing well. PLAN:  She will be weightbearing. She can go back to normal activity with no restrictions. I told the patient that it is not unusual to have some achy pain and swelling for up to a year after a fracture. F/U in 3 months  At that time, we will  get x-rays, 3 views of the right foot standing. Deidra Roberts MD DIAGNOSIS: Right 5th MT base Magallanes fracture, s/p ORIF with a intramedullary screw. DATE OF SURGERY:  9/21/2017. HISTORY OF PRESENT ILLNESS:     Johnita Cockayne 60 y.o.  female 2 months out from her surgery.   She

## 2017-12-20 ENCOUNTER — OFFICE VISIT (OUTPATIENT)
Dept: ORTHOPEDIC SURGERY | Age: 62
End: 2017-12-20

## 2017-12-20 VITALS — HEIGHT: 64 IN | RESPIRATION RATE: 15 BRPM | WEIGHT: 125 LBS | BODY MASS INDEX: 21.34 KG/M2

## 2017-12-20 DIAGNOSIS — M79.672 LEFT FOOT PAIN: ICD-10-CM

## 2017-12-20 DIAGNOSIS — M20.22 HALLUX RIGIDUS OF LEFT FOOT: Primary | ICD-10-CM

## 2017-12-20 PROCEDURE — 20600 DRAIN/INJ JOINT/BURSA W/O US: CPT | Performed by: ORTHOPAEDIC SURGERY

## 2017-12-20 PROCEDURE — 99214 OFFICE O/P EST MOD 30 MIN: CPT | Performed by: ORTHOPAEDIC SURGERY

## 2017-12-20 PROCEDURE — L3170 FOOT PLAS HEEL STABI PRE OTS: HCPCS | Performed by: ORTHOPAEDIC SURGERY

## 2017-12-20 NOTE — LETTER
 Spinal cord stimulator status     lower back--instructed to bring ID card DOS/pt states usually keeps it off       Past Surgical History:   Procedure Laterality Date    BACK SURGERY  2007    lumbar fusion and cage    BACK SURGERY  10/03/2016     Right C5-6 and C6-7 laminotomy, partial facetectomy and foraminotomy     Microdissection using the operating room microscope. 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 Jerrica Nguyen History     Social History    Marital status:      Spouse name: N/A    Number of children: 3    Years of education: N/A     Occupational History    office work      Social History Main Topics    Smoking status: Never Smoker    Smokeless tobacco: Never Used    Alcohol use No    Drug use: No    Sexual activity: No     Other Topics Concern    Not on file     Social History Narrative    No narrative on file       Family History   Problem Relation Age of Onset    COPD Mother     High Blood Pressure Other     Other Other      COPD    High Blood Pressure Father        Current Outpatient Prescriptions on File Prior to Visit   Medication Sig Dispense Refill    Turmeric POWD by Does not apply route      citalopram (CELEXA) 20 MG tablet Take 10 mg by mouth daily       amlodipine (NORVASC) 5 MG tablet Take 10 mg by mouth daily       lisinopril (PRINIVIL;ZESTRIL) 40 MG tablet Take 40 mg by mouth daily.  aspirin 81 MG tablet Take 81 mg by mouth daily. No current facility-administered medications on file prior to visit.         Pertinent items are noted in HPI PLAN:  She will continue WBAT and aggressive ROM and peroneal strengthening exercise. She can go back to normal activity with no restrictions. I told the patient that it is not unusual to have some achy pain and swelling for up to a year after a fracture. We recommended stretching exercises of the calf which was taught to the patient today. She will take NSAIDS PRN. Use stiff sole shoes. I believe she will benefit from cortisone injection left great toe, and she agreed to have. PROCEDURE:    With the patient's permission, and under sterile condition, the left big toe MP joint area was prepared and draped with alcohol and injected with a mixture of 1 ml Kenalog 40mg and 1 ml of 1% lidocaine. The patient tolerated the procedure well. A band-aid was applied and the patient was advised to ice the big toe for 15-20 minutes to relieve any injection site related pain. She reported a good improvement immediatly after the injection. The patient will come back for a follow up in 3 months.  At that time, we will  get x-rays, 3 views of the left foot. Sruthi Sainz MD      If you have questions, please do not hesitate to call me. I look forward to following Alejandro Dowell along with you.     Sincerely,        Sruthi Sainz MD

## 2017-12-25 NOTE — PROGRESS NOTES
netatarsal base nonunion, open repair with a bone graft    FOOT SURGERY Right     5th metatarsal pinning    HAND SURGERY Right 02/22/2011    Closed reduction & perc pinning R ring metacarpal fx per Dr. Gurpreet Covarrubias History     Social History    Marital status:      Spouse name: N/A    Number of children: 3    Years of education: N/A     Occupational History    office work      Social History Main Topics    Smoking status: Never Smoker    Smokeless tobacco: Never Used    Alcohol use No    Drug use: No    Sexual activity: No     Other Topics Concern    Not on file     Social History Narrative    No narrative on file       Family History   Problem Relation Age of Onset    COPD Mother     High Blood Pressure Other     Other Other      COPD    High Blood Pressure Father        Current Outpatient Prescriptions on File Prior to Visit   Medication Sig Dispense Refill    Turmeric POWD by Does not apply route      citalopram (CELEXA) 20 MG tablet Take 10 mg by mouth daily       amlodipine (NORVASC) 5 MG tablet Take 10 mg by mouth daily       lisinopril (PRINIVIL;ZESTRIL) 40 MG tablet Take 40 mg by mouth daily.  aspirin 81 MG tablet Take 81 mg by mouth daily. No current facility-administered medications on file prior to visit. Pertinent items are noted in HPI  Review of systems reviewed from Patient History Form dated on 8/16/2017 and available in the patient's chart under the Media tab. No change noted. PHYSICAL EXAMINATION:  Ms. Addison Cottrell is a very pleasant 60 y.o.  female who presents today in no acute distress, awake, alert, and oriented. She is well dressed, nourished and  groomed. Patient with normal affect. Height is  5' 4\" (1.626 m), weight is 125 lb (56.7 kg), Body mass index is 21.46 kg/m². Resting respiratory rate is 16. The patient walks with no limp.  All the incisions are healed nicely bilateral feet.  No signs of any erythema or drainage.  She  has minimal to no swelling. No tenderness over the left 1st TMT fusion, 5th MT or right foot 5th MT and no pain with bilateral ankle ROM. She has intact sensation in the left foot. Good strength, and no instability both upper and lower extremities. Ankle reflex 1+ bilaterally. She has mild tenderness on deep palpation over the left great toe MPJ, with prominent dorsal osteophytes.  The ankles are stable to drawer test bilaterally, equally.  Ankle reflex 1+ bilaterally. IMAGING:    Three views of the left foot taken today in the office were reviewed.  These demonstrate that the 5th MT shaft fracture nonunion with IM screw in good position healed well, 1st TMT plate in place, fusion healed well, one distal screw broken. Mild Hallux rigidus with arthritis and dorsal osteophytes.       Three views of the right foot taken 11/15/2017 in the office were reviewed. These demonstrate that the 5th MT base fracture in good position, healed well, screw in place, no hardware failure. IMPRESSION:     1- Left great toe MPJ pain/Mild Hallux rigidus.     2- Left foot 1st TMT arthrodesis, 11/14/2016. 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 4- Right 5th MT base Magallanes fracture, s/p ORIF with a intramedullary screw, 9/21/2017. PLAN:  She will continue WBAT and aggressive ROM and peroneal strengthening exercise. She can go back to normal activity with no restrictions. I told the patient that it is not unusual to have some achy pain and swelling for up to a year after a fracture. We recommended stretching exercises of the calf which was taught to the patient today. She will take NSAIDS PRN. Use stiff sole shoes. I believe she will benefit from cortisone injection left great toe, and she agreed to have.      PROCEDURE:    With the patient's permission, and under sterile condition, the left big toe MP joint area was prepared and draped with alcohol and injected with a mixture of 1 ml Kenalog 40mg and 1 ml of 1% lidocaine. The patient tolerated the procedure well. A band-aid was applied and the patient was advised to ice the big toe for 15-20 minutes to relieve any injection site related pain. She reported a good improvement immediatly after the injection. The patient will come back for a follow up in 3 months.  At that time, we will  get x-rays, 3 views of the left foot.        Gila Guevara MD

## 2017-12-29 NOTE — COMMUNICATION BODY
or drainage.  She  has minimal to no swelling. No tenderness over the left 1st TMT fusion, 5th MT or right foot 5th MT and no pain with bilateral ankle ROM. She has intact sensation in the left foot. Good strength, and no instability both upper and lower extremities. Ankle reflex 1+ bilaterally. She has mild tenderness on deep palpation over the left great toe MPJ, with prominent dorsal osteophytes.  The ankles are stable to drawer test bilaterally, equally.  Ankle reflex 1+ bilaterally. IMAGING:    Three views of the left foot taken today in the office were reviewed.  These demonstrate that the 5th MT shaft fracture nonunion with IM screw in good position healed well, 1st TMT plate in place, fusion healed well, one distal screw broken. Mild Hallux rigidus with arthritis and dorsal osteophytes.       Three views of the right foot taken 11/15/2017 in the office were reviewed. These demonstrate that the 5th MT base fracture in good position, healed well, screw in place, no hardware failure. IMPRESSION:     1- Left great toe MPJ pain/Mild Hallux rigidus.     2- Left foot 1st TMT arthrodesis, 11/14/2016. 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 4- Right 5th MT base Magallanes fracture, s/p ORIF with a intramedullary screw, 9/21/2017. PLAN:  She will continue WBAT and aggressive ROM and peroneal strengthening exercise. She can go back to normal activity with no restrictions. I told the patient that it is not unusual to have some achy pain and swelling for up to a year after a fracture. We recommended stretching exercises of the calf which was taught to the patient today. She will take NSAIDS PRN. Use stiff sole shoes. I believe she will benefit from cortisone injection left great toe, and she agreed to have.      PROCEDURE:    With the patient's permission, and under sterile condition, the left big toe MP joint area was prepared and draped with alcohol and injected with a mixture of 1

## 2018-01-24 ENCOUNTER — OFFICE VISIT (OUTPATIENT)
Dept: ORTHOPEDIC SURGERY | Age: 63
End: 2018-01-24

## 2018-01-24 VITALS
RESPIRATION RATE: 16 BRPM | BODY MASS INDEX: 21.34 KG/M2 | DIASTOLIC BLOOD PRESSURE: 83 MMHG | SYSTOLIC BLOOD PRESSURE: 136 MMHG | HEIGHT: 64 IN | HEART RATE: 77 BPM | WEIGHT: 125 LBS

## 2018-01-24 DIAGNOSIS — M79.672 FOOT PAIN, LEFT: Primary | ICD-10-CM

## 2018-01-24 PROCEDURE — 73630 X-RAY EXAM OF FOOT: CPT | Performed by: ORTHOPAEDIC SURGERY

## 2018-01-24 PROCEDURE — 99213 OFFICE O/P EST LOW 20 MIN: CPT | Performed by: ORTHOPAEDIC SURGERY

## 2018-01-24 NOTE — LETTER
4- Right 5th MT base Magallanes fracture, s/p ORIF with a intramedullary screw, 9/21/2017. PLAN:  She will continue WBAT and aggressive ROM and peroneal strengthening exercise. She can go back to normal activity with no restrictions. I told the patient that it is not unusual to have some achy pain and swelling for up to a year after a fracture. We recommended continue stretching exercises of the calf which was taught to the patient today. She will take NSAIDS PRN. Use stiff sole shoes. Continue Exogen bone stimulator to help in bone union. The patient will come back for a follow up in 3 months.  At that time, we will  get x-rays, 3 views of the left foot. We may consider CT scan to evaluate 1st TMT fusion. Ulises Denney MD      If you have questions, please do not hesitate to call me. I look forward to following Aguilar Elizondo along with you.     Sincerely,        Ulises Denney MD

## 2018-01-28 NOTE — PROGRESS NOTES
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 Ajit Osei History     Social History    Marital status:      Spouse name: N/A    Number of children: 3    Years of education: N/A     Occupational History    office work      Social History Main Topics    Smoking status: Never Smoker    Smokeless tobacco: Never Used    Alcohol use No    Drug use: No    Sexual activity: No     Other Topics Concern    Not on file     Social History Narrative    No narrative on file       Family History   Problem Relation Age of Onset    COPD Mother     High Blood Pressure Other     Other Other      COPD    High Blood Pressure Father        Current Outpatient Prescriptions on File Prior to Visit   Medication Sig Dispense Refill    Turmeric POWD by Does not apply route      citalopram (CELEXA) 20 MG tablet Take 10 mg by mouth daily       amlodipine (NORVASC) 5 MG tablet Take 10 mg by mouth daily       lisinopril (PRINIVIL;ZESTRIL) 40 MG tablet Take 40 mg by mouth daily.  aspirin 81 MG tablet Take 81 mg by mouth daily. No current facility-administered medications on file prior to visit. Pertinent items are noted in HPI  Review of systems reviewed from Patient History Form dated on 8/16/2017 and available in the patient's chart under the Media tab. No change noted. PHYSICAL EXAMINATION:  Ms. Prabhu Bales is a very pleasant 60 y.o.  female who presents today in no acute distress, awake, alert, and oriented. She is well dressed, nourished and  groomed. Patient with normal affect. Height is  5' 4\" (1.626 m), weight is 125 lb (56.7 kg), Body mass index is 21.46 kg/m².   Resting respiratory rate is 16.     The patient walks with no limp. All the incisions are healed nicely bilateral feet.  No signs of any erythema or drainage.  She  has minimal to no swelling. No tenderness over the left 1st TMT fusion, 5th MT or right foot 5th MT and no pain with bilateral ankle ROM. She has intact sensation in the left foot. Good strength, and no instability both upper and lower extremities. Ankle reflex 1+ bilaterally. She has mild tenderness on deep palpation over the left great toe MPJ, with prominent dorsal osteophytes.  The ankles are stable to drawer test bilaterally, equally.  Ankle reflex 1+ bilaterally. IMAGING:    Three views of the left foot taken today in the office were reviewed.  These demonstrate that the 5th MT shaft fracture nonunion with IM screw in good position healed well, 1st TMT plate in place, fusion healed well, one distal screw broken. Mild Hallux rigidus with arthritis and dorsal osteophytes.       Three views of the right foot taken 11/15/2017 in the office were reviewed. These demonstrate that the 5th MT base fracture in good position, healed well, screw in place, no hardware failure. IMPRESSION:     1- Left great toe MPJ pain/Mild Hallux rigidus.     2- Left foot 1st TMT arthrodesis, 11/14/2016. 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 4- Right 5th MT base Magallanes fracture, s/p ORIF with a intramedullary screw, 9/21/2017. PLAN:  She will continue WBAT and aggressive ROM and peroneal strengthening exercise. She can go back to normal activity with no restrictions. I told the patient that it is not unusual to have some achy pain and swelling for up to a year after a fracture. We recommended continue stretching exercises of the calf which was taught to the patient today. She will take NSAIDS PRN. Use stiff sole shoes. Continue Exogen bone stimulator to help in bone union.  The patient will come back for a follow up in 3 months.  At that time, we will  get x-rays, 3 views of the left foot. We may consider CT scan to evaluate 1st TMT fusion.       Larissa Prieto MD

## 2018-07-02 ENCOUNTER — HOSPITAL ENCOUNTER (OUTPATIENT)
Dept: WOMENS IMAGING | Age: 63
Discharge: HOME | End: 2018-07-02
Attending: FAMILY MEDICINE | Admitting: FAMILY MEDICINE

## 2018-07-02 DIAGNOSIS — M50.30 OTHER CERVICAL DISC DEGENERATION, UNSPECIFIED CERVICAL REGION: ICD-10-CM

## 2018-07-02 DIAGNOSIS — Z12.31 VISIT FOR SCREENING MAMMOGRAM: ICD-10-CM

## 2018-10-03 ENCOUNTER — OFFICE VISIT (OUTPATIENT)
Dept: ORTHOPEDIC SURGERY | Age: 63
End: 2018-10-03
Payer: COMMERCIAL

## 2018-10-03 VITALS
SYSTOLIC BLOOD PRESSURE: 127 MMHG | HEART RATE: 88 BPM | HEIGHT: 64 IN | RESPIRATION RATE: 16 BRPM | DIASTOLIC BLOOD PRESSURE: 81 MMHG | BODY MASS INDEX: 21.34 KG/M2 | WEIGHT: 125 LBS

## 2018-10-03 DIAGNOSIS — S92.302K: ICD-10-CM

## 2018-10-03 DIAGNOSIS — S92.342A CLOSED DISPLACED FRACTURE OF FOURTH METATARSAL BONE OF LEFT FOOT, INITIAL ENCOUNTER: Primary | ICD-10-CM

## 2018-10-03 PROCEDURE — 28470 CLTX METATARSAL FX WO MNP EA: CPT | Performed by: ORTHOPAEDIC SURGERY

## 2018-10-03 PROCEDURE — 99214 OFFICE O/P EST MOD 30 MIN: CPT | Performed by: ORTHOPAEDIC SURGERY

## 2018-10-04 PROBLEM — S92.342A CLOSED DISPLACED FRACTURE OF FOURTH METATARSAL BONE OF LEFT FOOT: Status: ACTIVE | Noted: 2018-10-04

## 2018-10-04 NOTE — PROGRESS NOTES
CHIEF COMPLAINT:   1-Left foot pain/ 4th MT shaft minimally displaced stress fracture, 10/3/2018. 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 Yelena Ruiz 58 y.o.   female  presents today for the first visit for evaluation of a left foot pain which she noticed 2-2 1/2 weeks ago with pain and increased swelling. She is complaining of lateral foot pain and swelling. Rates pain a 5/10 VAS and worsening. This is better with elevation and worse with bearing any wt. The pain is sharp and not radiating. No other complaint. She is well known to me for left great toe hallux rigidus left foot first TMT arthrodesis on 11/14/2016, left fifth metatarsal shaft repair nonunion with IM screw on 11/14/2016 and for a right fifth metatarsal base Magallanes fracture status post ORIF with IM screw on 9/21/2017. She denies any pain in her right foot. 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    Depression     prescribed after death of spouse--5 years ago   Yashira Able HBP (high blood pressure)     History of blood transfusion     in child bearing years    S/P insertion of spinal cord stimulator     Spinal cord stimulator status     lower back--instructed to bring ID card DOS/pt states usually keeps it off       Past Surgical History:   Procedure Laterality Date    BACK SURGERY  2007    lumbar fusion and cage    BACK SURGERY  10/03/2016     Right C5-6 and C6-7 laminotomy, partial facetectomy and foraminotomy     Microdissection using the operating room microscope.     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 Marya Ellis History     Social History    Marital status:      Spouse name: N/A    Number of children: 3    Years of education: N/A     Occupational History    office work      Social History Main Topics    Smoking status: Never Smoker    Smokeless tobacco: Never Used    Alcohol use No    Drug use: No    Sexual activity: No     Other Topics Concern    Not on file     Social History Narrative    No narrative on file       Family History   Problem Relation Age of Onset    COPD Mother     High Blood Pressure Other     Other Other         COPD    High Blood Pressure Father        Current Outpatient Prescriptions on File Prior to Visit   Medication Sig Dispense Refill    Turmeric POWD by Does not apply route      citalopram (CELEXA) 20 MG tablet Take 10 mg by mouth daily       amlodipine (NORVASC) 5 MG tablet Take 10 mg by mouth daily       lisinopril (PRINIVIL;ZESTRIL) 40 MG tablet Take 40 mg by mouth daily.  aspirin 81 MG tablet Take 81 mg by mouth daily. No current facility-administered medications on file prior to visit. Pertinent items are noted in HPI  Review of systems reviewed from Patient History Form dated on 10/3/2018 and available in the patient's chart under the Media tab. No change. PHYSICAL EXAMINATION:  Ms. Robert Bynum is a very pleasant 58 y.o.  female who presents today in no acute distress, awake, alert, and oriented. She is well dressed, nourished and  groomed. Patient with normal affect. Height is  5' 4\" (1.626 m), weight is 125 lb (56.7 kg), Body mass index is 21.46 kg/m². Resting respiratory rate is 16. Examination of the gait, showed that the patient walks with a limp, WB left  leg in a postop shoe.   Examination of both ankles

## 2018-10-12 ENCOUNTER — TELEPHONE (OUTPATIENT)
Dept: ORTHOPEDIC SURGERY | Age: 63
End: 2018-10-12

## 2018-10-12 NOTE — LETTER
[facility]  [address]  [address] [facility] [address]  Phone: [phone]  Fax: [phone]    Funmilayo Lu MD        October 16, 2018     Patient: Javi Us   YOB: 1955   Date of Visit: 10/12/2018       To Whom It May Concern: It is my medical opinion that Selwyn Gallego requires a disability parking placard for the following reasons:  She has limited walking ability due to an orthopedic condition. Duration of need: 6 months    If you have any questions or concerns, please don't hesitate to call.     Sincerely,          Funmilayo Lu MD

## 2018-10-15 NOTE — TELEPHONE ENCOUNTER
Called an informed patient that SMA is out today and I would speak with him tomorrow.  Patient in agreement to plan

## 2018-11-14 ENCOUNTER — OFFICE VISIT (OUTPATIENT)
Dept: ORTHOPEDIC SURGERY | Age: 63
End: 2018-11-14

## 2018-11-14 VITALS — HEIGHT: 64 IN | WEIGHT: 125 LBS | BODY MASS INDEX: 21.34 KG/M2

## 2018-11-14 DIAGNOSIS — S92.342A CLOSED DISPLACED FRACTURE OF FOURTH METATARSAL BONE OF LEFT FOOT, INITIAL ENCOUNTER: ICD-10-CM

## 2018-11-14 DIAGNOSIS — S92.302K: ICD-10-CM

## 2018-11-14 DIAGNOSIS — S92.355A CLOSED NONDISPLACED FRACTURE OF FIFTH METATARSAL BONE OF LEFT FOOT, INITIAL ENCOUNTER: Primary | ICD-10-CM

## 2018-11-14 DIAGNOSIS — S92.902K CLOSED FRACTURE OF LEFT FOOT WITH NONUNION, SUBSEQUENT ENCOUNTER: ICD-10-CM

## 2018-11-14 PROCEDURE — 99024 POSTOP FOLLOW-UP VISIT: CPT | Performed by: ORTHOPAEDIC SURGERY

## 2018-11-17 NOTE — PROGRESS NOTES
CHIEF COMPLAINT:   1-Left foot pain/ 4th MT shaft minimally displaced stress fracture, 10/3/2018. 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 Jose G Mccall 58 y.o.  female  presents today for f/u evaluation of a left foot pain which she noticed Sept 2018 with pain and increased swelling. She is complaining of lateral foot pain and swelling. Rates pain a 5/10 VAS and worsening. This is better with elevation and worse with bearing any wt. The pain is sharp and not radiating. No other complaint. She is well known to me for left great toe hallux rigidus left foot first TMT arthrodesis on 11/14/2016, left fifth metatarsal shaft repair nonunion with IM screw on 11/14/2016 and for a right fifth metatarsal base Magallanes fracture status post ORIF with IM screw on 9/21/2017. She denies any pain in her right foot. 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    Depression     prescribed after death of spouse--5 years ago   Bishnu Matthews HBP (high blood pressure)     History of blood transfusion     in child bearing years    S/P insertion of spinal cord stimulator     Spinal cord stimulator status     lower back--instructed to bring ID card DOS/pt states usually keeps it off       Past Surgical History:   Procedure Laterality Date    BACK SURGERY  2007    lumbar fusion and cage    BACK SURGERY  10/03/2016     Right C5-6 and C6-7 laminotomy, partial facetectomy and foraminotomy     Microdissection using the operating room microscope.     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

## 2019-07-21 ENCOUNTER — APPOINTMENT (OUTPATIENT)
Dept: GENERAL RADIOLOGY | Age: 64
End: 2019-07-21
Payer: COMMERCIAL

## 2019-07-21 ENCOUNTER — HOSPITAL ENCOUNTER (EMERGENCY)
Age: 64
Discharge: HOME | End: 2019-07-21
Attending: EMERGENCY MEDICINE
Payer: COMMERCIAL

## 2019-07-21 ENCOUNTER — APPOINTMENT (OUTPATIENT)
Dept: CT IMAGING | Age: 64
End: 2019-07-21
Payer: COMMERCIAL

## 2019-07-21 VITALS
OXYGEN SATURATION: 97 % | DIASTOLIC BLOOD PRESSURE: 79 MMHG | HEART RATE: 81 BPM | TEMPERATURE: 98.1 F | RESPIRATION RATE: 16 BRPM | SYSTOLIC BLOOD PRESSURE: 132 MMHG

## 2019-07-21 DIAGNOSIS — S60.221A CONTUSION OF RIGHT HAND, INITIAL ENCOUNTER: ICD-10-CM
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
## 2019-07-21 DIAGNOSIS — S16.1XXA ACUTE STRAIN OF NECK MUSCLE, INITIAL ENCOUNTER: ICD-10-CM

## 2019-07-21 PROCEDURE — 73130 X-RAY EXAM OF HAND: CPT

## 2019-07-21 PROCEDURE — 72125 CT NECK SPINE W/O DYE: CPT

## 2019-07-21 PROCEDURE — 99284 EMERGENCY DEPT VISIT MOD MDM: CPT

## 2019-07-21 ASSESSMENT — PAIN DESCRIPTION - LOCATION: LOCATION: HAND

## 2019-07-21 ASSESSMENT — PAIN SCALES - GENERAL
PAINLEVEL_OUTOF10: 5

## 2019-07-21 ASSESSMENT — PAIN - FUNCTIONAL ASSESSMENT: PAIN_FUNCTIONAL_ASSESSMENT: 0-10

## 2019-07-21 ASSESSMENT — PAIN DESCRIPTION - PAIN TYPE: TYPE: ACUTE PAIN

## 2019-07-21 ASSESSMENT — PAIN DESCRIPTION - DESCRIPTORS: DESCRIPTORS: ACHING

## 2019-07-21 ASSESSMENT — PAIN DESCRIPTION - ORIENTATION: ORIENTATION: RIGHT
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
## 2019-07-22 ASSESSMENT — ENCOUNTER SYMPTOMS
BACK PAIN: 0
SHORTNESS OF BREATH: 0
COUGH: 0
ABDOMINAL PAIN: 0
VOICE CHANGE: 0
TROUBLE SWALLOWING: 0
EYE REDNESS: 0
COLOR CHANGE: 1

## 2019-07-22 NOTE — ED NOTES
Condition pain unchanged from arrival discharge instructions given with prescription x 0 , refused ace wrap, and thumb spica splint      Rise SAE Hallman  07/21/19 2025

## 2019-09-12 ENCOUNTER — HOSPITAL ENCOUNTER (OUTPATIENT)
Dept: WOMENS IMAGING | Age: 64
Discharge: HOME | End: 2019-09-12
Payer: MEDICARE

## 2019-09-12 DIAGNOSIS — Z12.31 VISIT FOR SCREENING MAMMOGRAM: ICD-10-CM

## 2019-09-12 PROCEDURE — 77067 SCR MAMMO BI INCL CAD: CPT

## 2019-09-25 ENCOUNTER — OFFICE VISIT (OUTPATIENT)
Dept: ORTHOPEDIC SURGERY | Age: 64
End: 2019-09-25
Payer: MEDICARE

## 2019-09-25 VITALS
BODY MASS INDEX: 23.04 KG/M2 | SYSTOLIC BLOOD PRESSURE: 132 MMHG | WEIGHT: 130 LBS | HEART RATE: 86 BPM | HEIGHT: 63 IN | DIASTOLIC BLOOD PRESSURE: 88 MMHG

## 2019-09-25 DIAGNOSIS — M48.062 SPINAL STENOSIS OF LUMBAR REGION WITH NEUROGENIC CLAUDICATION: Primary | ICD-10-CM

## 2019-09-25 DIAGNOSIS — Z98.1 S/P LUMBAR FUSION: ICD-10-CM

## 2019-09-25 PROCEDURE — 99214 OFFICE O/P EST MOD 30 MIN: CPT | Performed by: ORTHOPAEDIC SURGERY

## 2019-09-25 PROCEDURE — G8399 PT W/DXA RESULTS DOCUMENT: HCPCS | Performed by: ORTHOPAEDIC SURGERY

## 2019-09-25 PROCEDURE — 1123F ACP DISCUSS/DSCN MKR DOCD: CPT | Performed by: ORTHOPAEDIC SURGERY

## 2019-09-25 PROCEDURE — 1036F TOBACCO NON-USER: CPT | Performed by: ORTHOPAEDIC SURGERY

## 2019-09-25 PROCEDURE — G8420 CALC BMI NORM PARAMETERS: HCPCS | Performed by: ORTHOPAEDIC SURGERY

## 2019-09-25 PROCEDURE — 4040F PNEUMOC VAC/ADMIN/RCVD: CPT | Performed by: ORTHOPAEDIC SURGERY

## 2019-09-25 PROCEDURE — 1090F PRES/ABSN URINE INCON ASSESS: CPT | Performed by: ORTHOPAEDIC SURGERY

## 2019-09-25 PROCEDURE — 3017F COLORECTAL CA SCREEN DOC REV: CPT | Performed by: ORTHOPAEDIC SURGERY

## 2019-09-25 PROCEDURE — G8427 DOCREV CUR MEDS BY ELIG CLIN: HCPCS | Performed by: ORTHOPAEDIC SURGERY

## 2019-09-25 RX ORDER — IBUPROFEN 200 MG
200 TABLET ORAL EVERY 6 HOURS PRN
Status: ON HOLD | COMMUNITY
End: 2021-02-11 | Stop reason: HOSPADM

## 2019-10-03 ENCOUNTER — HOSPITAL ENCOUNTER (OUTPATIENT)
Dept: GENERAL RADIOLOGY | Age: 64
Discharge: HOME | End: 2019-10-03
Payer: MEDICARE

## 2019-10-03 ENCOUNTER — HOSPITAL ENCOUNTER (OUTPATIENT)
Dept: CT IMAGING | Age: 64
Discharge: HOME | End: 2019-10-03
Payer: MEDICARE

## 2019-10-03 VITALS
TEMPERATURE: 98.3 F | HEART RATE: 78 BPM | DIASTOLIC BLOOD PRESSURE: 61 MMHG | SYSTOLIC BLOOD PRESSURE: 129 MMHG | HEIGHT: 63 IN | OXYGEN SATURATION: 99 % | WEIGHT: 128.75 LBS | BODY MASS INDEX: 22.81 KG/M2 | RESPIRATION RATE: 14 BRPM

## 2019-10-03 DIAGNOSIS — Z98.1 S/P LUMBAR FUSION: ICD-10-CM

## 2019-10-03 DIAGNOSIS — M48.062 SPINAL STENOSIS OF LUMBAR REGION WITH NEUROGENIC CLAUDICATION: ICD-10-CM

## 2019-10-03 LAB
HCT VFR BLD CALC: 35.9 % (ref 36–48)
HEMOGLOBIN: 12 G/DL (ref 12–16)
INR BLD: 0.94 (ref 0.86–1.14)
MCH RBC QN AUTO: 30.9 PG (ref 26–34)
MCHC RBC AUTO-ENTMCNC: 33.4 G/DL (ref 31–36)
MCV RBC AUTO: 92.5 FL (ref 80–100)
PDW BLD-RTO: 13.8 % (ref 12.4–15.4)
PLATELET # BLD: 365 K/UL (ref 135–450)
PMV BLD AUTO: 7 FL (ref 5–10.5)
PROTHROMBIN TIME: 10.7 SEC (ref 9.8–13)
RBC # BLD: 3.88 M/UL (ref 4–5.2)
WBC # BLD: 4 K/UL (ref 4–11)

## 2019-10-03 PROCEDURE — 85610 PROTHROMBIN TIME: CPT

## 2019-10-03 PROCEDURE — 72265 MYELOGRAPHY L-S SPINE: CPT

## 2019-10-03 PROCEDURE — 7100000010 HC PHASE II RECOVERY - FIRST 15 MIN

## 2019-10-03 PROCEDURE — 62304 MYELOGRAPHY LUMBAR INJECTION: CPT

## 2019-10-03 PROCEDURE — 72132 CT LUMBAR SPINE W/DYE: CPT

## 2019-10-03 PROCEDURE — 85027 COMPLETE CBC AUTOMATED: CPT

## 2019-10-03 PROCEDURE — 77002 NEEDLE LOCALIZATION BY XRAY: CPT

## 2019-10-03 PROCEDURE — 7100000011 HC PHASE II RECOVERY - ADDTL 15 MIN

## 2019-10-03 PROCEDURE — 6360000004 HC RX CONTRAST MEDICATION: Performed by: RADIOLOGY

## 2019-10-03 PROCEDURE — 36415 COLL VENOUS BLD VENIPUNCTURE: CPT

## 2019-10-03 RX ADMIN — IOPAMIDOL 10 ML: 408 INJECTION, SOLUTION INTRATHECAL at 10:53

## 2019-10-03 ASSESSMENT — PAIN SCALES - GENERAL: PAINLEVEL_OUTOF10: 0

## 2019-10-03 ASSESSMENT — PAIN - FUNCTIONAL ASSESSMENT: PAIN_FUNCTIONAL_ASSESSMENT: 0-10

## 2019-10-07 ENCOUNTER — TELEPHONE (OUTPATIENT)
Dept: ORTHOPEDIC SURGERY | Age: 64
End: 2019-10-07

## 2019-10-17 ENCOUNTER — OFFICE VISIT (OUTPATIENT)
Dept: ORTHOPEDIC SURGERY | Age: 64
End: 2019-10-17
Payer: MEDICARE

## 2019-10-17 VITALS — WEIGHT: 128.75 LBS | BODY MASS INDEX: 22.81 KG/M2 | HEIGHT: 63 IN

## 2019-10-17 DIAGNOSIS — M70.62 GREATER TROCHANTERIC BURSITIS OF LEFT HIP: ICD-10-CM

## 2019-10-17 DIAGNOSIS — M25.552 PAIN OF LEFT HIP JOINT: Primary | ICD-10-CM

## 2019-10-17 PROCEDURE — G8420 CALC BMI NORM PARAMETERS: HCPCS | Performed by: ORTHOPAEDIC SURGERY

## 2019-10-17 PROCEDURE — G8484 FLU IMMUNIZE NO ADMIN: HCPCS | Performed by: ORTHOPAEDIC SURGERY

## 2019-10-17 PROCEDURE — 1123F ACP DISCUSS/DSCN MKR DOCD: CPT | Performed by: ORTHOPAEDIC SURGERY

## 2019-10-17 PROCEDURE — G8427 DOCREV CUR MEDS BY ELIG CLIN: HCPCS | Performed by: ORTHOPAEDIC SURGERY

## 2019-10-17 PROCEDURE — 1036F TOBACCO NON-USER: CPT | Performed by: ORTHOPAEDIC SURGERY

## 2019-10-17 PROCEDURE — G8399 PT W/DXA RESULTS DOCUMENT: HCPCS | Performed by: ORTHOPAEDIC SURGERY

## 2019-10-17 PROCEDURE — 4040F PNEUMOC VAC/ADMIN/RCVD: CPT | Performed by: ORTHOPAEDIC SURGERY

## 2019-10-17 PROCEDURE — 1090F PRES/ABSN URINE INCON ASSESS: CPT | Performed by: ORTHOPAEDIC SURGERY

## 2019-10-17 PROCEDURE — 99213 OFFICE O/P EST LOW 20 MIN: CPT | Performed by: ORTHOPAEDIC SURGERY

## 2019-10-17 PROCEDURE — 3017F COLORECTAL CA SCREEN DOC REV: CPT | Performed by: ORTHOPAEDIC SURGERY

## 2019-10-17 RX ORDER — METHYLPREDNISOLONE 4 MG/1
TABLET ORAL
Qty: 1 KIT | Refills: 0 | Status: SHIPPED | OUTPATIENT
Start: 2019-10-17 | End: 2021-02-08

## 2019-10-17 RX ORDER — PREDNISONE 10 MG/1
TABLET ORAL
Qty: 21 TABLET | Refills: 0 | Status: SHIPPED | OUTPATIENT
Start: 2019-10-17 | End: 2021-02-08

## 2019-10-17 RX ORDER — BETAMETHASONE SODIUM PHOSPHATE AND BETAMETHASONE ACETATE 3; 3 MG/ML; MG/ML
12 INJECTION, SUSPENSION INTRA-ARTICULAR; INTRALESIONAL; INTRAMUSCULAR; SOFT TISSUE ONCE
Status: COMPLETED | OUTPATIENT
Start: 2019-10-17 | End: 2019-10-17

## 2019-10-17 RX ADMIN — BETAMETHASONE SODIUM PHOSPHATE AND BETAMETHASONE ACETATE 12 MG: 3; 3 INJECTION, SUSPENSION INTRA-ARTICULAR; INTRALESIONAL; INTRAMUSCULAR; SOFT TISSUE at 09:46

## 2020-08-20 ENCOUNTER — OFFICE VISIT (OUTPATIENT)
Dept: ORTHOPEDIC SURGERY | Age: 65
End: 2020-08-20
Payer: MEDICARE

## 2020-08-20 VITALS — WEIGHT: 128.75 LBS | HEIGHT: 63 IN | BODY MASS INDEX: 22.81 KG/M2

## 2020-08-20 PROCEDURE — G8420 CALC BMI NORM PARAMETERS: HCPCS | Performed by: ORTHOPAEDIC SURGERY

## 2020-08-20 PROCEDURE — 3017F COLORECTAL CA SCREEN DOC REV: CPT | Performed by: ORTHOPAEDIC SURGERY

## 2020-08-20 PROCEDURE — 1123F ACP DISCUSS/DSCN MKR DOCD: CPT | Performed by: ORTHOPAEDIC SURGERY

## 2020-08-20 PROCEDURE — 4040F PNEUMOC VAC/ADMIN/RCVD: CPT | Performed by: ORTHOPAEDIC SURGERY

## 2020-08-20 PROCEDURE — G8427 DOCREV CUR MEDS BY ELIG CLIN: HCPCS | Performed by: ORTHOPAEDIC SURGERY

## 2020-08-20 PROCEDURE — 1090F PRES/ABSN URINE INCON ASSESS: CPT | Performed by: ORTHOPAEDIC SURGERY

## 2020-08-20 PROCEDURE — 99213 OFFICE O/P EST LOW 20 MIN: CPT | Performed by: ORTHOPAEDIC SURGERY

## 2020-08-20 PROCEDURE — 1036F TOBACCO NON-USER: CPT | Performed by: ORTHOPAEDIC SURGERY

## 2020-08-20 PROCEDURE — G8399 PT W/DXA RESULTS DOCUMENT: HCPCS | Performed by: ORTHOPAEDIC SURGERY

## 2020-08-20 RX ORDER — BUPIVACAINE HYDROCHLORIDE 5 MG/ML
30 INJECTION, SOLUTION PERINEURAL ONCE
Status: COMPLETED | OUTPATIENT
Start: 2020-08-20 | End: 2020-08-20

## 2020-08-20 RX ORDER — BETAMETHASONE SODIUM PHOSPHATE AND BETAMETHASONE ACETATE 3; 3 MG/ML; MG/ML
12 INJECTION, SUSPENSION INTRA-ARTICULAR; INTRALESIONAL; INTRAMUSCULAR; SOFT TISSUE ONCE
Status: COMPLETED | OUTPATIENT
Start: 2020-08-20 | End: 2020-08-20

## 2020-08-20 RX ADMIN — BETAMETHASONE SODIUM PHOSPHATE AND BETAMETHASONE ACETATE 12 MG: 3; 3 INJECTION, SUSPENSION INTRA-ARTICULAR; INTRALESIONAL; INTRAMUSCULAR; SOFT TISSUE at 16:47

## 2020-08-20 RX ADMIN — BUPIVACAINE HYDROCHLORIDE 150 MG: 5 INJECTION, SOLUTION PERINEURAL at 16:47

## 2020-08-20 NOTE — PROGRESS NOTES
Chief Complaint    Follow-up (right hip, req injection)      History of Present Illness:  Ingrid Montilla is a 71 y.o. female returns today for reevaluation and treatment of right lateral hip pain. We last saw the patient back in October and diagnosed her with bilateral greater trochanteric bursitis. At that time she received bilateral greater trochanteric injections but had significant improvement in her hip pain. She now complains of pain that has returned over the lateral aspect of the right hip and occasionally into the groin. She reports the pain to be a 6/10 and aggravated by walking and standing. She also complains of some pain over the lateral aspect of her hip at nighttime. She denies any complaints of any numbness and tingling.   Pain Assessment  Location of Pain: Pelvis  Location Modifiers: Right  Severity of Pain: 6  Quality of Pain: Dull, Aching  Duration of Pain: A few minutes  Frequency of Pain: Intermittent  Aggravating Factors: Walking, Standing  Limiting Behavior: No  Relieving Factors: Rest  Result of Injury: No  Work-Related Injury: No  Are there other pain locations you wish to document?: No]         Medical History:  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    Depression     prescribed after death of spouse--5 years ago    HBP (high blood pressure)     History of blood transfusion     in child bearing years    S/P insertion of spinal cord stimulator     Spinal cord stimulator status     lower back--instructed to bring ID card DOS/pt states usually keeps it off     Patient Active Problem List    Diagnosis Date Noted    Closed displaced fracture of fourth metatarsal bone of left foot 10/04/2018    Ganglion cyst of volar aspect of left wrist 10/09/2017    Closed nondisplaced fracture of fifth metatarsal bone of right foot 09/19/2017   [employer] [facility] [employer] fracture 09/19/2017    Hallux rigidus of left foot 08/20/2017    Closed nondisplaced fracture of metatarsal bone of left foot with nonunion     Cubital tunnel syndrome on right 09/26/2016    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/2016    Arthrosis of left midfoot     Arthritis of left midfoot, 1st, 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 11/18/2015    Arthrosis of midfoot     Synovitis of ankle, right 09/18/2012    Avulsion fracture of talus, right 08/28/2012    Closed fracture of metatarsal bone 02/04/2010    Sprain and strain of medial collateral ligament of knee 02/04/2010     Current Outpatient Medications   Medication Sig Dispense Refill    predniSONE (DELTASONE) 10 MG tablet 1 BID X 1 WEEK, 1 QD X 1 WEEK, THEN BEGIN MEDROL DOSE PACK AS DIRECTED 21 tablet 0    methylPREDNISolone (MEDROL, BRY,) 4 MG tablet TAKE AS DIRECTED ON PACKAGE INSERT 1 kit 0    ibuprofen (ADVIL;MOTRIN) 200 MG tablet Take 200 mg by mouth every 6 hours as needed for Pain      Turmeric POWD by Does not apply route      citalopram (CELEXA) 20 MG tablet Take 10 mg by mouth daily       amlodipine (NORVASC) 5 MG tablet Take 10 mg by mouth daily       lisinopril (PRINIVIL;ZESTRIL) 40 MG tablet Take 40 mg by mouth daily.  aspirin 81 MG tablet Take 81 mg by mouth daily. No current facility-administered medications for this visit. Review of Systems:  Relevant review of systems reviewed and available in the patient's chart    Vital Signs: There were no vitals filed for this visit. General Exam:   Constitutional: Patient is adequately groomed with no evidence of malnutrition  DTRs: Deep tendon reflexes are intact  Mental Status: The patient is oriented to time, place and person. The patient's mood and affect are appropriate. Lymphatic: The lymphatic examination bilaterally reveals all areas to be without enlargement or induration. Vascular: Examination reveals no swelling or calf tenderness.   Peripheral pulses are palpable and

## 2020-10-01 ENCOUNTER — HOSPITAL ENCOUNTER (OUTPATIENT)
Dept: WOMENS IMAGING | Age: 65
Discharge: HOME | End: 2020-10-01
Payer: MEDICARE

## 2020-10-01 PROCEDURE — 77063 BREAST TOMOSYNTHESIS BI: CPT

## 2020-10-06 LAB — HEPATITIS B CORE IGM ANTIBODY: NORMAL

## 2020-10-09 LAB — HEPATITIS B CORE TOTAL ANTIBODY: POSITIVE

## 2020-10-11 LAB
HBV QNT LOG, IU/ML: NOT DETECTED LOG IU/ML
HBV QNT, IU/ML: NOT DETECTED IU/ML
INTERPRETATION: NOT DETECTED

## 2020-11-17 ENCOUNTER — OFFICE VISIT (OUTPATIENT)
Dept: ORTHOPEDIC SURGERY | Age: 65
End: 2020-11-17
Payer: MEDICARE

## 2020-11-17 VITALS — WEIGHT: 128 LBS | BODY MASS INDEX: 23.55 KG/M2 | HEIGHT: 62 IN

## 2020-11-17 PROCEDURE — 99213 OFFICE O/P EST LOW 20 MIN: CPT | Performed by: ORTHOPAEDIC SURGERY

## 2020-11-17 PROCEDURE — 1090F PRES/ABSN URINE INCON ASSESS: CPT | Performed by: ORTHOPAEDIC SURGERY

## 2020-11-17 PROCEDURE — 1036F TOBACCO NON-USER: CPT | Performed by: ORTHOPAEDIC SURGERY

## 2020-11-17 PROCEDURE — 1123F ACP DISCUSS/DSCN MKR DOCD: CPT | Performed by: ORTHOPAEDIC SURGERY

## 2020-11-17 PROCEDURE — 4040F PNEUMOC VAC/ADMIN/RCVD: CPT | Performed by: ORTHOPAEDIC SURGERY

## 2020-11-17 PROCEDURE — G8399 PT W/DXA RESULTS DOCUMENT: HCPCS | Performed by: ORTHOPAEDIC SURGERY

## 2020-11-17 PROCEDURE — 3017F COLORECTAL CA SCREEN DOC REV: CPT | Performed by: ORTHOPAEDIC SURGERY

## 2020-11-17 PROCEDURE — G8427 DOCREV CUR MEDS BY ELIG CLIN: HCPCS | Performed by: ORTHOPAEDIC SURGERY

## 2020-11-17 PROCEDURE — G8484 FLU IMMUNIZE NO ADMIN: HCPCS | Performed by: ORTHOPAEDIC SURGERY

## 2020-11-17 PROCEDURE — G8420 CALC BMI NORM PARAMETERS: HCPCS | Performed by: ORTHOPAEDIC SURGERY

## 2020-11-17 NOTE — PROGRESS NOTES
History of present illness:   Ms. Jimmy De La Rosa  is a pleasant 76 y.o. female with a PMH of 13 years s/p lumbar fusion, 4 years s/p C5/6 and C6/7 posterior foraminotomy, lumbar spinal cord stimulator, carpal tunnel, HTN and depression here following up regarding her LBP and now right leg pain. Was evaluated by us for low back and left leg pain last year which her left leg pain has resolved. She states her new pain began insidiously about 3-4 months ago. Her pain has steadily increased since then. She rates her leg pain 7/10. She describes the pain as sharp and shooting that is worse with sitting, lying down, and standing. Slightly better with walking and leaning forward. The leg pain radiates from her right buttock to her lateral thigh to her knee. She denies numbness and tingling in her leg. She denies progressive weakness of her leg and denies bowel or bladder dysfunction. She has tried multiple epidural injections and physical therapy in the distant past prior to her surgery without relief. She has had her spinal cord stimulator removed. Has been evaluated by Dr. Carla Garcia for her hips who feels most of her pain is from her lumbar spine. Has tried hip injections without relief. She takes NSAIDs. States she has tried gabapentin in the past which has never seemed to help. Had prolonged course of oral sterids about 5 months ago.      Current Medication:  Current Outpatient Medications   Medication Sig Dispense Refill    predniSONE (DELTASONE) 10 MG tablet 1 BID X 1 WEEK, 1 QD X 1 WEEK, THEN BEGIN MEDROL DOSE PACK AS DIRECTED 21 tablet 0    methylPREDNISolone (MEDROL, BRY,) 4 MG tablet TAKE AS DIRECTED ON PACKAGE INSERT 1 kit 0    ibuprofen (ADVIL;MOTRIN) 200 MG tablet Take 200 mg by mouth every 6 hours as needed for Pain      Turmeric POWD by Does not apply route      citalopram (CELEXA) 20 MG tablet Take 10 mg by mouth daily       amlodipine (NORVASC) 5 MG tablet Take 10 mg by mouth daily       lisinopril (PRINIVIL;ZESTRIL) 40 MG tablet Take 40 mg by mouth daily.  aspirin 81 MG tablet Take 81 mg by mouth daily. No current facility-administered medications for this visit. Physical examination:  Ms. Iraida Luong most recent vitals:  Vitals  Height: 5' 2\" (157.5 cm)  Weight: 128 lb (58.1 kg)  Body mass index is 23.41 kg/m². General exam:  She is well-developed and well-nourished, is in obvious pain and alert and oriented to person, place, and time. She demonstrates appropriate mood and affect. Her skin is warm and dry. Her gait is normal and she walks heel to toe without limp or instability. Back:  She stands with slight lumbar flexion. Her lumbar flexion, extension and lateral bending are moderately reduced with pain. She has mild tenderness over her lumbar spine without obvious muscle spasm. The skin over her lumbar spine is normal without a surgical scar. Lower extremities:  She has 5/5 motor strength of bilateral lower extremities. She has a negative straight leg raise, bilaterally. Deep tendon reflexes at knees and achilles are 2+. Sensation is intact to light touch L3 to S1 bilaterally. She has no clonus. Hip range of motion painless. Imaging:  Reviewed CT myelogram from 10/3/19 which shows moderate to severe central canal stenosis L2-3 and moderate L3-4. Stable fusion L4-5. I reviewed AP and lateral x-ray images of her lumbar spine from previous office visit. They show stable L4-5 fusion with degenerative scoliosis. Multilevel lumbar spondylosis. Assessment:  Lumbar stenosis with radiculopathy  Degenerative scoliosis     Plan:  We discussed treatment options including observation, physical therapy, additional imaging, and epidural injections. She wishes to proceed with additional imaging with lumbar MRI with and without gadolinium. We will call her with results.

## 2020-11-19 ENCOUNTER — HOSPITAL ENCOUNTER (OUTPATIENT)
Dept: MRI IMAGING | Age: 65
Discharge: HOME | End: 2020-11-19
Payer: MEDICARE

## 2020-11-19 PROCEDURE — A9577 INJ MULTIHANCE: HCPCS | Performed by: ORTHOPAEDIC SURGERY

## 2020-11-19 PROCEDURE — 72158 MRI LUMBAR SPINE W/O & W/DYE: CPT

## 2020-11-19 PROCEDURE — 6360000004 HC RX CONTRAST MEDICATION: Performed by: ORTHOPAEDIC SURGERY

## 2020-11-19 RX ADMIN — GADOBENATE DIMEGLUMINE 13 ML: 529 INJECTION, SOLUTION INTRAVENOUS at 15:28

## 2020-11-20 ENCOUNTER — TELEPHONE (OUTPATIENT)
Dept: ORTHOPEDIC SURGERY | Age: 65
End: 2020-11-20

## 2020-11-23 ENCOUNTER — TELEPHONE (OUTPATIENT)
Dept: ORTHOPEDIC SURGERY | Age: 65
End: 2020-11-23

## 2020-12-04 ENCOUNTER — HOSPITAL ENCOUNTER (OUTPATIENT)
Dept: WOMENS IMAGING | Age: 65
Discharge: HOME | End: 2020-12-04
Payer: MEDICARE

## 2020-12-04 PROCEDURE — 77080 DXA BONE DENSITY AXIAL: CPT

## 2020-12-08 ENCOUNTER — OFFICE VISIT (OUTPATIENT)
Dept: ORTHOPEDIC SURGERY | Age: 65
End: 2020-12-08
Payer: MEDICARE

## 2020-12-08 VITALS — HEIGHT: 62 IN | BODY MASS INDEX: 23.55 KG/M2 | WEIGHT: 128 LBS | TEMPERATURE: 97.6 F

## 2020-12-08 PROCEDURE — G8420 CALC BMI NORM PARAMETERS: HCPCS | Performed by: ORTHOPAEDIC SURGERY

## 2020-12-08 PROCEDURE — G8427 DOCREV CUR MEDS BY ELIG CLIN: HCPCS | Performed by: ORTHOPAEDIC SURGERY

## 2020-12-08 PROCEDURE — 1090F PRES/ABSN URINE INCON ASSESS: CPT | Performed by: ORTHOPAEDIC SURGERY

## 2020-12-08 PROCEDURE — 99213 OFFICE O/P EST LOW 20 MIN: CPT | Performed by: ORTHOPAEDIC SURGERY

## 2020-12-08 PROCEDURE — G8399 PT W/DXA RESULTS DOCUMENT: HCPCS | Performed by: ORTHOPAEDIC SURGERY

## 2020-12-08 PROCEDURE — 1036F TOBACCO NON-USER: CPT | Performed by: ORTHOPAEDIC SURGERY

## 2020-12-08 PROCEDURE — 3017F COLORECTAL CA SCREEN DOC REV: CPT | Performed by: ORTHOPAEDIC SURGERY

## 2020-12-08 PROCEDURE — 1123F ACP DISCUSS/DSCN MKR DOCD: CPT | Performed by: ORTHOPAEDIC SURGERY

## 2020-12-08 PROCEDURE — G8484 FLU IMMUNIZE NO ADMIN: HCPCS | Performed by: ORTHOPAEDIC SURGERY

## 2020-12-08 PROCEDURE — 4040F PNEUMOC VAC/ADMIN/RCVD: CPT | Performed by: ORTHOPAEDIC SURGERY

## 2020-12-08 NOTE — PROGRESS NOTES
History of present illness:   Ms. Francesco Sharif  is a pleasant 76 y.o. female with a PMH of 13 years s/p lumbar fusion, 4 years s/p C5/6 and C6/7 posterior foraminotomy, lumbar spinal cord stimulator, carpal tunnel, HTN and depression here following up regarding her LBP and now right leg pain. Was evaluated by us for low back and left leg pain last year which her left leg pain has resolved. She states her new pain began insidiously about 3-4 months ago. Her pain has steadily increased since then. She rates her leg pain 7/10. She describes the pain as sharp and shooting that is worse with sitting, lying down, and standing. Slightly better with walking and leaning forward. The leg pain radiates from her right buttock to her lateral thigh to her knee. She denies numbness and tingling in her leg. She denies progressive weakness of her leg and denies bowel or bladder dysfunction. She has tried multiple epidural injections and physical therapy in the distant past prior to her surgery without relief. She has had her spinal cord stimulator removed. Has been evaluated by Dr. Rip Epstein for her hips who feels most of her pain is from her lumbar spine. Has tried hip injections without relief. She takes NSAIDs. States she has tried gabapentin in the past which has never seemed to help. Had prolonged course of oral sterids about 5 months ago.      Current Medication:  Current Outpatient Medications   Medication Sig Dispense Refill    predniSONE (DELTASONE) 10 MG tablet 1 BID X 1 WEEK, 1 QD X 1 WEEK, THEN BEGIN MEDROL DOSE PACK AS DIRECTED 21 tablet 0    methylPREDNISolone (MEDROL, BRY,) 4 MG tablet TAKE AS DIRECTED ON PACKAGE INSERT 1 kit 0    ibuprofen (ADVIL;MOTRIN) 200 MG tablet Take 200 mg by mouth every 6 hours as needed for Pain      Turmeric POWD by Does not apply route      citalopram (CELEXA) 20 MG tablet Take 10 mg by mouth daily       amlodipine (NORVASC) 5 MG tablet Take 10 mg by mouth daily       lisinopril (PRINIVIL;ZESTRIL) 40 MG tablet Take 40 mg by mouth daily.  aspirin 81 MG tablet Take 81 mg by mouth daily. No current facility-administered medications for this visit. Physical examination:  Ms. Kerns Fell most recent vitals:  Vitals  Temp: 97.6 °F (36.4 °C)  Height: 5' 2\" (157.5 cm)  Weight: 128 lb (58.1 kg)  Body mass index is 23.41 kg/m². General exam:  She is well-developed and well-nourished, is in obvious pain and alert and oriented to person, place, and time. She demonstrates appropriate mood and affect. Her skin is warm and dry. Her gait is normal and she walks heel to toe without limp or instability. Back:  She stands with slight lumbar flexion. Her lumbar flexion, extension and lateral bending are moderately reduced with pain. She has mild tenderness over her lumbar spine without obvious muscle spasm. The skin over her lumbar spine is normal without a surgical scar. Lower extremities:  She has 5/5 motor strength of bilateral lower extremities. She has a negative straight leg raise, bilaterally. Deep tendon reflexes at knees and achilles are 2+. Sensation is intact to light touch L3 to S1 bilaterally. She has no clonus. Hip range of motion painless. Imaging:  Reviewed CT myelogram from 10/3/19 which shows moderate to severe central canal stenosis L2-3 and moderate L3-4. Stable fusion L4-5. I reviewed AP and lateral x-ray images of her lumbar spine from previous office visit. They show stable L4-5 fusion with degenerative scoliosis. Multilevel lumbar spondylosis. I reviewed MRI images of her lumbar spine in the office today.   Those show severe foraminal stenosis right L3-L4 and left L5-S1, moderate to severe central stenosis L2-L3, and degenerative scoliosis with lateral listhesis    Assessment:  Lumbar stenosis with radiculopathy  Degenerative scoliosis     Plan:  We discussed treatment options including observation, physical therapy, epidural injections and laminectomy with spinal fusion. She wishes to proceed with spinal surgery. We discussed the risks, benefits, and alternatives to surgery including the risks of nerve or vessel injury, paralysis, spinal blood clot, spinal fluid leak, death, infection, need for additional surgery to remove or reposition rods, screws, or cages, that infuse could accelerate the growth of cancer, adjacent level arthritis failure of surgery to alleviate her symptoms and worse symptoms following surgery. She understands these risks and wishes to proceed with surgery. Her questions were answered.

## 2021-01-04 ENCOUNTER — TELEPHONE (OUTPATIENT)
Dept: ORTHOPEDIC SURGERY | Age: 66
End: 2021-01-04

## 2021-01-07 ENCOUNTER — TELEPHONE (OUTPATIENT)
Dept: ORTHOPEDIC SURGERY | Age: 66
End: 2021-01-07

## 2021-01-22 ENCOUNTER — OFFICE VISIT (OUTPATIENT)
Dept: VASCULAR SURGERY | Age: 66
End: 2021-01-22
Payer: MEDICARE

## 2021-01-22 VITALS
WEIGHT: 126 LBS | SYSTOLIC BLOOD PRESSURE: 120 MMHG | BODY MASS INDEX: 23.19 KG/M2 | HEIGHT: 62 IN | DIASTOLIC BLOOD PRESSURE: 68 MMHG

## 2021-01-22 DIAGNOSIS — M48.061 SPINAL STENOSIS OF LUMBAR REGION, UNSPECIFIED WHETHER NEUROGENIC CLAUDICATION PRESENT: Primary | ICD-10-CM

## 2021-01-22 PROCEDURE — 1090F PRES/ABSN URINE INCON ASSESS: CPT | Performed by: SURGERY

## 2021-01-22 PROCEDURE — G8484 FLU IMMUNIZE NO ADMIN: HCPCS | Performed by: SURGERY

## 2021-01-22 PROCEDURE — G8420 CALC BMI NORM PARAMETERS: HCPCS | Performed by: SURGERY

## 2021-01-22 PROCEDURE — G8427 DOCREV CUR MEDS BY ELIG CLIN: HCPCS | Performed by: SURGERY

## 2021-01-22 PROCEDURE — 99204 OFFICE O/P NEW MOD 45 MIN: CPT | Performed by: SURGERY

## 2021-01-22 RX ORDER — BUPROPION HYDROCHLORIDE 300 MG/1
300 TABLET ORAL EVERY MORNING
COMMUNITY

## 2021-01-22 NOTE — PROGRESS NOTES
Outpatient Consultation / H&P    Date of Consultation:  1/22/2021    PCP:  BESS Duckworth MD     Referring Provider:  Dr. Jake Marino     Chief Complaint:   Chief Complaint   Patient presents with    Other     patient is preop spine surgery ref by Dr Jake Marino for alif L5/S1. pamlr        History of Present Illness: We are asked to see this patient in consultation by Dr. Jake Marino regarding spinal exposure. Vasiliy Romero is a 76 y.o. female who has a history of degenerative spinal disease. She has undergone prior lumbar fusion via a posterior approach. She is now to undergo ant/post fusion L5/S1. She has had several abdominal surgeries. She is a non smoker. Past Medical History:  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    Depression     prescribed after death of spouse--5 years ago   [de-identified] HBP (high blood pressure)     History of blood transfusion     in child bearing years    S/P insertion of spinal cord stimulator     Spinal cord stimulator status     lower back--instructed to bring ID card DOS/pt states usually keeps it off       Past Surgical History:  Past Surgical History:   Procedure Laterality Date    BACK SURGERY  2007    lumbar fusion and cage    BACK SURGERY  10/03/2016     Right C5-6 and C6-7 laminotomy, partial facetectomy and foraminotomy     Microdissection using the operating room microscope.     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 Ricardo Partida Medications:   Prior to Admission medications    Medication Sig Start Date End Date Taking? Authorizing Provider   buPROPion (WELLBUTRIN XL) 300 MG extended release tablet Take 300 mg by mouth every morning   Yes Historical Provider, MD   predniSONE (DELTASONE) 10 MG tablet 1 BID X 1 WEEK, 1 QD X 1 WEEK, THEN BEGIN MEDROL DOSE PACK AS DIRECTED 10/17/19  Yes Ana Barry MD   methylPREDNISolone (MEDROL, BRY,) 4 MG tablet TAKE AS DIRECTED ON PACKAGE INSERT 10/17/19  Yes Ana Barry MD   ibuprofen (ADVIL;MOTRIN) 200 MG tablet Take 200 mg by mouth every 6 hours as needed for Pain   Yes Historical Provider, MD   Turmeric POWD by Does not apply route   Yes Historical Provider, MD   amlodipine (NORVASC) 5 MG tablet Take 10 mg by mouth daily  1/30/10  Yes Historical Provider, MD   lisinopril (PRINIVIL;ZESTRIL) 40 MG tablet Take 40 mg by mouth daily. Yes Historical Provider, MD   citalopram (CELEXA) 20 MG tablet Take 10 mg by mouth daily     Historical Provider, MD   aspirin 81 MG tablet Take 81 mg by mouth daily. Historical Provider, MD        Allergies:  Percocet [oxycodone-acetaminophen]      Social History:      Social History     Socioeconomic History    Marital status:       Spouse name: Not on file    Number of children: 3    Years of education: Not on file    Highest education level: Not on file   Occupational History    Occupation: office work   Social Needs    Financial resource strain: Not on file    Food insecurity     Worry: Not on file     Inability: Not on file   [employer] needs     Medical: Not on file     Non-medical: Not on file   Tobacco Use    Smoking status: Never Smoker    Smokeless tobacco: Never Used   Substance and Sexual Activity    Alcohol use: No     Alcohol/week: 0.0 standard drinks    Drug use: No    Sexual activity: Never   Lifestyle    Physical activity     Days per week: Not on file     Minutes per session: Not on file    Stress: Not on file   Relationships  Social connections     Talks on phone: Not on file     Gets together: Not on file     Attends [employer] service: Not on file     Active member of club or organization: Not on file     Attends meetings of clubs or organizations: Not on file     Relationship status: Not on file    Intimate partner violence     Fear of current or ex partner: Not on file     Emotionally abused: Not on file     Physically abused: Not on file     Forced sexual activity: Not on file   Other Topics Concern    Not on file   Social History Narrative    Not on file       Family History:        Problem Relation Age of Onset    COPD Mother     High Blood Pressure Other     Other Other         COPD    High Blood Pressure Father        Review of Systems:  A 14 point review of systems was completed. Pertinent positives identified in the HPI, all other review of systems negative. Physical Examination:    /68 (Site: Right Upper Arm)   Ht 5' 2\" (1.575 m)   Wt 126 lb (57.2 kg)   BMI 23.05 kg/m²     Weight: 126 lb (57.2 kg)       General appearance: NAD  Eyes: PERRLA  Neck: no JVD, no lymphadenopathy. Respiratory: effort is unlabored, no crackles, wheezes or rubs. Cardiovascular: regular, no murmur. No carotid bruits. No edema or varicosities. Pulses:    femoral PT   RIGHT 2 2   LEFT 2 2   GI: abdomen soft, nondistended, no organomegaly. Musculoskeletal: strength and tone normal.  Extremities: warm and pink. Skin: no dermatitis or ulceration. Neuro/psychiatric: grossly intact. Assessment:      Diagnosis Orders   1. Spinal stenosis of lumbar region, unspecified whether neurogenic claudication present       No contraindications to surgery. Recommendations/Plan:  I discussed the need for anterior fusion through a retroperitoneal  incision. Risks include but not limited to bleeding ,clotting,death,injury to bowel ,ureter and nerves,MI,CVA,wound complications including hernia,infection.  Diagrams used to explain procedure. Post operative expectations were explained and all  Questions were answered.       Sheffield Denver, MD, FACS

## 2021-01-25 ENCOUNTER — TELEPHONE (OUTPATIENT)
Dept: ORTHOPEDIC SURGERY | Age: 66
End: 2021-01-25

## 2021-01-25 NOTE — TELEPHONE ENCOUNTER
Auth: # NPR    Date: 02/11/2021  Type of SX:  IP  Location: [facility]  CPT: 04360.64   51683.83    [id number]   62147.59    41752.51    [id number]     [id number]    [id number]   [id number]   [id number]   [id number]   [id number]    [id number]     DX Code: M41.80  SX area:  Anterior Lumbar Interbody fusion  L5 - S1 with medtronic cage :  Posterior Laminectomy and Fusion L5 - S1   Insurance: Medicare

## 2021-02-05 ENCOUNTER — OFFICE VISIT (OUTPATIENT)
Dept: PRIMARY CARE CLINIC | Age: 66
End: 2021-02-05
Payer: MEDICARE

## 2021-02-05 DIAGNOSIS — Z01.812 PRE-PROCEDURAL LABORATORY EXAMINATIONS: Primary | ICD-10-CM

## 2021-02-05 LAB — SARS-COV-2: NOT DETECTED

## 2021-02-05 PROCEDURE — G8420 CALC BMI NORM PARAMETERS: HCPCS | Performed by: NURSE PRACTITIONER

## 2021-02-05 PROCEDURE — 99211 OFF/OP EST MAY X REQ PHY/QHP: CPT | Performed by: NURSE PRACTITIONER

## 2021-02-05 PROCEDURE — G8428 CUR MEDS NOT DOCUMENT: HCPCS | Performed by: NURSE PRACTITIONER

## 2021-02-05 NOTE — PROGRESS NOTES
Patient presented to [facility] drive up clinic for preop testing. Patient was swabbed and given information advising them to remain isolated until procedure date.

## 2021-02-08 RX ORDER — AMLODIPINE BESYLATE 10 MG/1
10 TABLET ORAL DAILY
COMMUNITY

## 2021-02-08 RX ORDER — FOLIC ACID 0.8 MG
TABLET ORAL 2 TIMES DAILY
COMMUNITY

## 2021-02-08 RX ORDER — MULTIVIT WITH MINERALS/LUTEIN
1000 TABLET ORAL DAILY
COMMUNITY

## 2021-02-08 RX ORDER — VIT C/B6/B5/MAGNESIUM/HERB 173 50-5-6-5MG
CAPSULE ORAL
COMMUNITY
End: 2021-02-08

## 2021-02-08 RX ORDER — ACETAMINOPHEN 160 MG
TABLET,DISINTEGRATING ORAL
COMMUNITY

## 2021-02-08 NOTE — PROGRESS NOTES
Preoperative Screening for Elective Surgery/Invasive Procedures While COVID-19 present in the community     Have you had any of the following symptoms? No  o Fever, chills  o Cough  o Shortness of breath  o Muscle aches/pain  o Diarrhea  o Abdominal pain, nausea, vomiting  o Loss or decrease in taste and / or smell   Risk of Exposure  o Have you recently been hospitalized for COVID-19 or flu-like illness, if so when? No  o Recently diagnosed with COVID-19, if so when? No  o Recently tested for COVID-19, if so when? No  o Have you been in close contact with a person or family member who currently has or recently had [phone]? If yes, when and in what context? No  o Do you live with anybody who in the last 14 days has had fever, chills, shortness of breath, muscle aches, flu-like illness? No  o Do you have any close contacts or family members who are currently in the hospital for COVID-19 or flu-like illness? No  If yes, assess recent close contact with this person. Indicate if the patient has a positive screen by answering yes to one or more of the above questions. Patients who test positive or screen positive prior to surgery or on the day of surgery should be evaluated in conjunction with the surgeon/proceduralist/anesthesiologist to determine the urgency of the procedure.

## 2021-02-10 ENCOUNTER — ANESTHESIA EVENT (OUTPATIENT)
Dept: OPERATING ROOM | Age: 66
DRG: 455 | End: 2021-02-10
Payer: MEDICARE

## 2021-02-10 NOTE — ANESTHESIA PRE PROCEDURE
Department of Anesthesiology  Preprocedure Note       Name:  Leonor Munoz   Age:  76 y.o.  :  1955                                          MRN:  [id number]         Date:  2021      Surgeon:  Petty Doss MD; Es Wood MD    Procedure:  ANTERIOR LUMBAR INTERBODY FUSION L5-S1 WITH MEDTRONIC CAGE, INFUSE, SCREWS, POSTERIOR LAMINECTOMY AND FUSION L5-S1 WITH MEDTRONIC RODS AND SCREWS WITH REMOVAL OF CLARITA AND SCREW L4-5 USING MEDTRONICS, EVOKES AND O-ARM     HPI:  76 y.o. female with a PMH of 13 years s/p lumbar fusion, 4 years s/p C5/6 and C6/7 posterior foraminotomy, lumbar spinal cord stimulator, carpal tunnel, HTN and depression here following up regarding her LBP and now right leg pain. Was evaluated by us for low back and left leg pain last year which her left leg pain has resolved. She states her new pain began insidiously about 3-4 months ago. Her pain has steadily increased since then. She rates her leg pain 7/10. She describes the pain as sharp and shooting that is worse with sitting, lying down, and standing. Slightly better with walking and leaning forward. The leg pain radiates from her right buttock to her lateral thigh to her knee. She denies numbness and tingling in her leg. She denies progressive weakness of her leg and denies bowel or bladder dysfunction. She has tried multiple epidural injections and physical therapy in the distant past prior to her surgery without relief.  She has had her spinal cord stimulator removed     [id number]  NSR 90; nl axis; possible LAE; no acute ischemic changes    Medications prior to admission:   amLODIPine (NORVASC) 10 MG tablet Take 10 mg by mouth daily   zinc 50 MG CAPS Take by mouth   Ascorbic Acid (VITAMIN C) 1000 MG tablet Take 1,000 mg by mouth daily   Cholecalciferol (VITAMIN D3) 50 MCG ( UT) Take by mouth   TURMERIC PO Take by mouth   Magnesium 500 MG CAPS Take by mouth 2 times daily   buPROPion (WELLBUTRIN XL) 300 MG  tablet Take 300 mg by mouth every morning   ibuprofen (ADVIL;MOTRIN) 200 MG tablet Take 200 mg by mouth every 6 hours as needed for Pain   lisinopril (PRINIVIL;ZESTRIL) 40 MG tablet Take 40 mg by mouth daily. 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    Arthrosis of midfoot    Arthrosis of left midfoot    Nonunion of foot fracture    Cubital tunnel syndrome on right    Closed nondisplaced fracture of metatarsal bone of left foot with nonunion    Hallux rigidus of left foot    Closed nondisplaced fracture of fifth metatarsal bone of right foot    Magallanes fracture    Ganglion cyst of volar aspect of left wrist    Closed displaced fracture of fourth metatarsal bone of left foot     Past Medical History:     Arthritis     osteoporosis    Closed fracture of metatarsal bone(s) 7/10/09    Left foot 5th metatarsal tuberosity fx with delayed union    Depression     prescribed after death of spouse--5 years ago    HBP (high blood pressure)     History of blood transfusion     in child bearing years    PONV (postoperative nausea and vomiting)     With one surgery    S/P insertion of spinal cord stimulator     Spinal cord stimulator status     lower back--instructed to bring ID card DOS/pt states usually keeps it off     Past Surgical History:     BACK SURGERY  2007    lumbar fusion and cage    BACK SURGERY  10/03/2016     Right C5-6 and C6-7 laminotomy, partial facetectomy and foraminotomy     Microdissection using the operating room microscope.     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 Aaliyah Delaney       Social History:    Tobacco Use    Smoking status: Never Smoker    Smokeless tobacco: Never Used   Substance Use Topics    Alcohol use: No     Alcohol/week: 0.0 standard drinks     Vital Signs (Current):    BP: 132/73 Pulse: 92   Resp: 16 SpO2: 99   Temp: 98.7 °F (37.1 °C)   Height: 5' 2\" (1.575 m)  (02/11/21) Weight: 119 lb 4 oz (54.1 kg)  (02/11/21)   BMI: 21.9           BP Readings from Last 3 Encounters:   01/22/21 120/68   10/03/19 129/61   09/25/19 132/88     NPO Status: >8 hrs                          BMI:   Wt Readings from Last 3 Encounters:   01/22/21 126 lb (57.2 kg)   12/08/20 128 lb (58.1 kg)   11/17/20 128 lb (58.1 kg)     Body mass index is 22.86 kg/m². CBC: 01/19/2021  WBC . 6.2   HGB 11.6 (L)   HCT 34.5 (L)        CMP: 01/19/2021  Na 137   K 3.6      CO2 29   GLU 95   BUN 14   Cr 0.99   Ca 10.0   Prot 6.6   ALB 4.1   GFR 58 (L)     Coags:    PROTIME 10.7 10/03/2019    INR 0.94 10/03/2019     COVID-19 Screening (If Applicable): COVID19 Not Detected 02/05/2021     Anesthesia Evaluation  Patient summary reviewed and Nursing notes reviewed   history of anesthetic complications: PONV.   Airway: Mallampati: II  TM distance: >3 FB   Neck ROM: full  Mouth opening: > = 3 FB Dental:          Pulmonary: breath sounds clear to auscultation      (-) COPD, asthma, recent URI, sleep apnea and wheezes                           Cardiovascular:    (+) hypertension:,     (-) past MI,  angina,  CRUZ and murmur      Rhythm: regular  Rate: normal                    Neuro/Psych:   (+) neuromuscular disease:, psychiatric history:depression/anxiety    (-) seizures, TIA and CVA            ROS comment: See HPI GI/Hepatic/Renal:        (-) GERD, hepatitis, liver disease and no renal disease       Endo/Other:        (-) diabetes mellitus, hypothyroidism               Abdominal:           Vascular:     - DVT and PE. Anesthesia Plan      general and TIVA     ASA 3       Induction: intravenous. MIPS: Prophylactic antiemetics administered. Anesthetic plan and risks discussed with patient. Plan discussed with CRNA.             Clancy Burkitt, MD

## 2021-02-11 ENCOUNTER — HOSPITAL ENCOUNTER (INPATIENT)
Age: 66
LOS: 2 days | Discharge: HOME | DRG: 455 | End: 2021-02-13
Attending: ORTHOPAEDIC SURGERY | Admitting: ORTHOPAEDIC SURGERY
Payer: MEDICARE

## 2021-02-11 ENCOUNTER — ANESTHESIA (OUTPATIENT)
Dept: OPERATING ROOM | Age: 66
DRG: 455 | End: 2021-02-11
Payer: MEDICARE

## 2021-02-11 ENCOUNTER — APPOINTMENT (OUTPATIENT)
Dept: GENERAL RADIOLOGY | Age: 66
DRG: 455 | End: 2021-02-11
Attending: ORTHOPAEDIC SURGERY
Payer: MEDICARE

## 2021-02-11 VITALS
OXYGEN SATURATION: 100 % | RESPIRATION RATE: 12 BRPM | TEMPERATURE: 98.6 F | SYSTOLIC BLOOD PRESSURE: 113 MMHG | DIASTOLIC BLOOD PRESSURE: 71 MMHG

## 2021-02-11 DIAGNOSIS — Z98.1 S/P LUMBAR SPINAL FUSION: Primary | ICD-10-CM

## 2021-02-11 LAB
ABO/RH: NORMAL
ANTIBODY SCREEN: NORMAL
HCT VFR BLD CALC: 29.3 % (ref 36–48)
HEMOGLOBIN: 9.8 G/DL (ref 12–16)

## 2021-02-11 PROCEDURE — 85018 HEMOGLOBIN: CPT

## 2021-02-11 PROCEDURE — 0SG10A0 FUSION OF 2 OR MORE LUMBAR VERTEBRAL JOINTS WITH INTERBODY FUSION DEVICE, ANTERIOR APPROACH, ANTERIOR COLUMN, OPEN APPROACH: ICD-10-PCS | Performed by: ORTHOPAEDIC SURGERY

## 2021-02-11 PROCEDURE — 3600000005 HC SURGERY LEVEL 5 BASE: Performed by: ORTHOPAEDIC SURGERY

## 2021-02-11 PROCEDURE — 6360000002 HC RX W HCPCS: Performed by: PHYSICIAN ASSISTANT

## 2021-02-11 PROCEDURE — 22585 ARTHRD ANT NTRBD MIN DSC EA: CPT | Performed by: SURGERY

## 2021-02-11 PROCEDURE — 2580000003 HC RX 258: Performed by: ORTHOPAEDIC SURGERY

## 2021-02-11 PROCEDURE — 2580000003 HC RX 258: Performed by: ANESTHESIOLOGY

## 2021-02-11 PROCEDURE — 86923 COMPATIBILITY TEST ELECTRIC: CPT

## 2021-02-11 PROCEDURE — 01NB0ZZ RELEASE LUMBAR NERVE, OPEN APPROACH: ICD-10-PCS | Performed by: ORTHOPAEDIC SURGERY

## 2021-02-11 PROCEDURE — 2709999900 HC NON-CHARGEABLE SUPPLY: Performed by: ORTHOPAEDIC SURGERY

## 2021-02-11 PROCEDURE — 6360000002 HC RX W HCPCS: Performed by: ORTHOPAEDIC SURGERY

## 2021-02-11 PROCEDURE — 6370000000 HC RX 637 (ALT 250 FOR IP): Performed by: PHYSICIAN ASSISTANT

## 2021-02-11 PROCEDURE — 0SG30A0 FUSION OF LUMBOSACRAL JOINT WITH INTERBODY FUSION DEVICE, ANTERIOR APPROACH, ANTERIOR COLUMN, OPEN APPROACH: ICD-10-PCS | Performed by: ORTHOPAEDIC SURGERY

## 2021-02-11 PROCEDURE — 3209999900 FLUORO FOR SURGICAL PROCEDURES

## 2021-02-11 PROCEDURE — 0SB20ZZ EXCISION OF LUMBAR VERTEBRAL DISC, OPEN APPROACH: ICD-10-PCS | Performed by: ORTHOPAEDIC SURGERY

## 2021-02-11 PROCEDURE — 86850 RBC ANTIBODY SCREEN: CPT

## 2021-02-11 PROCEDURE — 7100000000 HC PACU RECOVERY - FIRST 15 MIN: Performed by: ORTHOPAEDIC SURGERY

## 2021-02-11 PROCEDURE — 2500000003 HC RX 250 WO HCPCS

## 2021-02-11 PROCEDURE — 6360000002 HC RX W HCPCS: Performed by: ANESTHESIOLOGY

## 2021-02-11 PROCEDURE — 6370000000 HC RX 637 (ALT 250 FOR IP)

## 2021-02-11 PROCEDURE — 0SB40ZZ EXCISION OF LUMBOSACRAL DISC, OPEN APPROACH: ICD-10-PCS | Performed by: ORTHOPAEDIC SURGERY

## 2021-02-11 PROCEDURE — 3600000015 HC SURGERY LEVEL 5 ADDTL 15MIN: Performed by: ORTHOPAEDIC SURGERY

## 2021-02-11 PROCEDURE — 86900 BLOOD TYPING SEROLOGIC ABO: CPT

## 2021-02-11 PROCEDURE — 2580000003 HC RX 258

## 2021-02-11 PROCEDURE — 2580000003 HC RX 258: Performed by: PHYSICIAN ASSISTANT

## 2021-02-11 PROCEDURE — 1200000000 HC SEMI PRIVATE

## 2021-02-11 PROCEDURE — 2500000003 HC RX 250 WO HCPCS: Performed by: ORTHOPAEDIC SURGERY

## 2021-02-11 PROCEDURE — 2720000010 HC SURG SUPPLY STERILE: Performed by: ORTHOPAEDIC SURGERY

## 2021-02-11 PROCEDURE — 6370000000 HC RX 637 (ALT 250 FOR IP): Performed by: ANESTHESIOLOGY

## 2021-02-11 PROCEDURE — 72100 X-RAY EXAM L-S SPINE 2/3 VWS: CPT

## 2021-02-11 PROCEDURE — 3E0U0GB INTRODUCTION OF RECOMBINANT BONE MORPHOGENETIC PROTEIN INTO JOINTS, OPEN APPROACH: ICD-10-PCS | Performed by: ORTHOPAEDIC SURGERY

## 2021-02-11 PROCEDURE — 0SG1071 FUSION OF 2 OR MORE LUMBAR VERTEBRAL JOINTS WITH AUTOLOGOUS TISSUE SUBSTITUTE, POSTERIOR APPROACH, POSTERIOR COLUMN, OPEN APPROACH: ICD-10-PCS | Performed by: ORTHOPAEDIC SURGERY

## 2021-02-11 PROCEDURE — 7100000001 HC PACU RECOVERY - ADDTL 15 MIN: Performed by: ORTHOPAEDIC SURGERY

## 2021-02-11 PROCEDURE — 36415 COLL VENOUS BLD VENIPUNCTURE: CPT

## 2021-02-11 PROCEDURE — 0SG3071 FUSION OF LUMBOSACRAL JOINT WITH AUTOLOGOUS TISSUE SUBSTITUTE, POSTERIOR APPROACH, POSTERIOR COLUMN, OPEN APPROACH: ICD-10-PCS | Performed by: ORTHOPAEDIC SURGERY

## 2021-02-11 PROCEDURE — 2780000010 HC IMPLANT OTHER: Performed by: ORTHOPAEDIC SURGERY

## 2021-02-11 PROCEDURE — 85014 HEMATOCRIT: CPT

## 2021-02-11 PROCEDURE — C1713 ANCHOR/SCREW BN/BN,TIS/BN: HCPCS | Performed by: ORTHOPAEDIC SURGERY

## 2021-02-11 PROCEDURE — 86901 BLOOD TYPING SEROLOGIC RH(D): CPT

## 2021-02-11 PROCEDURE — 3700000001 HC ADD 15 MINUTES (ANESTHESIA): Performed by: ORTHOPAEDIC SURGERY

## 2021-02-11 PROCEDURE — 3700000000 HC ANESTHESIA ATTENDED CARE: Performed by: ORTHOPAEDIC SURGERY

## 2021-02-11 PROCEDURE — 0QP004Z REMOVAL OF INTERNAL FIXATION DEVICE FROM LUMBAR VERTEBRA, OPEN APPROACH: ICD-10-PCS | Performed by: ORTHOPAEDIC SURGERY

## 2021-02-11 PROCEDURE — 6360000002 HC RX W HCPCS

## 2021-02-11 PROCEDURE — 22558 ARTHRD ANT NTRBD MIN DSC LUM: CPT | Performed by: SURGERY

## 2021-02-11 DEVICE — BONE GRAFT KIT 7510400 INFUSE MEDIUM
Type: IMPLANTABLE DEVICE | Site: ABDOMEN | Status: FUNCTIONAL
Brand: INFUSE® BONE GRAFT

## 2021-02-11 DEVICE — SET SCREW 5540030 5.5 TI NS BRK OFF
Type: IMPLANTABLE DEVICE | Site: SPINE LUMBAR | Status: FUNCTIONAL
Brand: CD HORIZON® SPINAL SYSTEM

## 2021-02-11 DEVICE — SCREW 7975525 SOVEREIGN FA 5.5 X 25MM
Type: IMPLANTABLE DEVICE | Site: SPINE LUMBAR | Status: FUNCTIONAL
Brand: SOVEREIGN™ SPINAL SYSTEM

## 2021-02-11 DEVICE — SCREW 7975530 SOVEREIGN FA 5.5 X 30MM
Type: IMPLANTABLE DEVICE | Site: SPINE LUMBAR | Status: FUNCTIONAL
Brand: SOVEREIGN™ SPINAL SYSTEM

## 2021-02-11 DEVICE — ASSEMBLY 7968212 M 37X27 12MM 12 DEG CP
Type: IMPLANTABLE DEVICE | Site: ABDOMEN | Status: FUNCTIONAL
Brand: CRESCENT™ SPINAL SYSTEM

## 2021-02-11 DEVICE — ASSEMBLY 7967210 S 32X23 10MM 12DEG CP
Type: IMPLANTABLE DEVICE | Site: ABDOMEN | Status: FUNCTIONAL
Brand: SOVEREIGN™ SPINAL SYSTEM

## 2021-02-11 DEVICE — SCREW 7976025 SOVEREIGN FA 6.0 X 25MM
Type: IMPLANTABLE DEVICE | Site: SPINE LUMBAR | Status: FUNCTIONAL
Brand: SOVEREIGN™ SPINAL SYSTEM

## 2021-02-11 RX ORDER — FENTANYL CITRATE 50 UG/ML
INJECTION, SOLUTION INTRAMUSCULAR; INTRAVENOUS PRN
Status: DISCONTINUED | OUTPATIENT
Start: 2021-02-11 | End: 2021-02-11 | Stop reason: SDUPTHER

## 2021-02-11 RX ORDER — PROPOFOL 10 MG/ML
INJECTION, EMULSION INTRAVENOUS PRN
Status: DISCONTINUED | OUTPATIENT
Start: 2021-02-11 | End: 2021-02-11 | Stop reason: SDUPTHER

## 2021-02-11 RX ORDER — LIDOCAINE HYDROCHLORIDE 10 MG/ML
0.3 INJECTION, SOLUTION EPIDURAL; INFILTRATION; INTRACAUDAL; PERINEURAL
Status: DISCONTINUED | OUTPATIENT
Start: 2021-02-11 | End: 2021-02-11 | Stop reason: HOSPADM

## 2021-02-11 RX ORDER — VANCOMYCIN HYDROCHLORIDE 1 G/20ML
INJECTION, POWDER, LYOPHILIZED, FOR SOLUTION INTRAVENOUS PRN
Status: DISCONTINUED | OUTPATIENT
Start: 2021-02-11 | End: 2021-02-11 | Stop reason: ALTCHOICE

## 2021-02-11 RX ORDER — METHOCARBAMOL 750 MG/1
1500 TABLET, FILM COATED ORAL EVERY 8 HOURS PRN
Status: DISCONTINUED | OUTPATIENT
Start: 2021-02-11 | End: 2021-02-13 | Stop reason: HOSPADM

## 2021-02-11 RX ORDER — LISINOPRIL 20 MG/1
40 TABLET ORAL DAILY
Status: DISCONTINUED | OUTPATIENT
Start: 2021-02-11 | End: 2021-02-13 | Stop reason: HOSPADM

## 2021-02-11 RX ORDER — LIDOCAINE HYDROCHLORIDE 40 MG/ML
SOLUTION TOPICAL PRN
Status: DISCONTINUED | OUTPATIENT
Start: 2021-02-11 | End: 2021-02-11 | Stop reason: SDUPTHER

## 2021-02-11 RX ORDER — SODIUM CHLORIDE 9 MG/ML
INJECTION, SOLUTION INTRAVENOUS CONTINUOUS PRN
Status: DISCONTINUED | OUTPATIENT
Start: 2021-02-11 | End: 2021-02-11 | Stop reason: SDUPTHER

## 2021-02-11 RX ORDER — GABAPENTIN 300 MG/1
300 CAPSULE ORAL 3 TIMES DAILY
Status: DISCONTINUED | OUTPATIENT
Start: 2021-02-11 | End: 2021-02-13 | Stop reason: HOSPADM

## 2021-02-11 RX ORDER — OXYCODONE HYDROCHLORIDE AND ACETAMINOPHEN 5; 325 MG/1; MG/1
2 TABLET ORAL PRN
Status: DISCONTINUED | OUTPATIENT
Start: 2021-02-11 | End: 2021-02-11

## 2021-02-11 RX ORDER — LIDOCAINE HYDROCHLORIDE ANHYDROUS AND DEXTROSE MONOHYDRATE .4; 5 G/100ML; G/100ML
3 INJECTION, SOLUTION INTRAVENOUS CONTINUOUS
Status: DISCONTINUED | OUTPATIENT
Start: 2021-02-11 | End: 2021-02-11

## 2021-02-11 RX ORDER — AMLODIPINE BESYLATE 5 MG/1
10 TABLET ORAL DAILY
Status: DISCONTINUED | OUTPATIENT
Start: 2021-02-11 | End: 2021-02-13 | Stop reason: HOSPADM

## 2021-02-11 RX ORDER — SODIUM CHLORIDE 0.9 % (FLUSH) 0.9 %
10 SYRINGE (ML) INJECTION PRN
Status: DISCONTINUED | OUTPATIENT
Start: 2021-02-11 | End: 2021-02-11 | Stop reason: HOSPADM

## 2021-02-11 RX ORDER — VITAMIN B COMPLEX
2000 TABLET ORAL DAILY
Status: DISCONTINUED | OUTPATIENT
Start: 2021-02-11 | End: 2021-02-13 | Stop reason: HOSPADM

## 2021-02-11 RX ORDER — HYDRALAZINE HYDROCHLORIDE 20 MG/ML
5 INJECTION INTRAMUSCULAR; INTRAVENOUS EVERY 10 MIN PRN
Status: DISCONTINUED | OUTPATIENT
Start: 2021-02-11 | End: 2021-02-11 | Stop reason: HOSPADM

## 2021-02-11 RX ORDER — FENTANYL CITRATE 50 UG/ML
25 INJECTION, SOLUTION INTRAMUSCULAR; INTRAVENOUS EVERY 5 MIN PRN
Status: DISCONTINUED | OUTPATIENT
Start: 2021-02-11 | End: 2021-02-11 | Stop reason: HOSPADM

## 2021-02-11 RX ORDER — ONDANSETRON 2 MG/ML
INJECTION INTRAMUSCULAR; INTRAVENOUS PRN
Status: DISCONTINUED | OUTPATIENT
Start: 2021-02-11 | End: 2021-02-11 | Stop reason: SDUPTHER

## 2021-02-11 RX ORDER — APREPITANT 40 MG/1
40 CAPSULE ORAL ONCE
Status: COMPLETED | OUTPATIENT
Start: 2021-02-11 | End: 2021-02-11

## 2021-02-11 RX ORDER — PROMETHAZINE HYDROCHLORIDE 25 MG/1
12.5 TABLET ORAL EVERY 6 HOURS PRN
Status: DISCONTINUED | OUTPATIENT
Start: 2021-02-11 | End: 2021-02-13 | Stop reason: HOSPADM

## 2021-02-11 RX ORDER — HYDROCODONE BITARTRATE AND ACETAMINOPHEN 5; 325 MG/1; MG/1
2 TABLET ORAL EVERY 4 HOURS PRN
Qty: 42 TABLET | Refills: 0 | Status: SHIPPED | OUTPATIENT
Start: 2021-02-11 | End: 2021-02-18 | Stop reason: SDUPTHER

## 2021-02-11 RX ORDER — ONDANSETRON 4 MG/1
4 TABLET, FILM COATED ORAL 3 TIMES DAILY PRN
Qty: 15 TABLET | Refills: 0 | Status: SHIPPED | OUTPATIENT
Start: 2021-02-11 | End: 2021-04-12

## 2021-02-11 RX ORDER — PROMETHAZINE HYDROCHLORIDE 25 MG/ML
6.25 INJECTION, SOLUTION INTRAMUSCULAR; INTRAVENOUS
Status: DISCONTINUED | OUTPATIENT
Start: 2021-02-11 | End: 2021-02-11 | Stop reason: HOSPADM

## 2021-02-11 RX ORDER — METHOCARBAMOL 750 MG/1
750 TABLET, FILM COATED ORAL 3 TIMES DAILY
Qty: 90 TABLET | Refills: 0 | Status: SHIPPED | OUTPATIENT
Start: 2021-02-11 | End: 2021-03-13

## 2021-02-11 RX ORDER — PHENYLEPHRINE HCL IN 0.9% NACL 1 MG/10 ML
SYRINGE (ML) INTRAVENOUS PRN
Status: DISCONTINUED | OUTPATIENT
Start: 2021-02-11 | End: 2021-02-11 | Stop reason: SDUPTHER

## 2021-02-11 RX ORDER — MEPERIDINE HYDROCHLORIDE 50 MG/ML
12.5 INJECTION INTRAMUSCULAR; INTRAVENOUS; SUBCUTANEOUS EVERY 5 MIN PRN
Status: DISCONTINUED | OUTPATIENT
Start: 2021-02-11 | End: 2021-02-11 | Stop reason: HOSPADM

## 2021-02-11 RX ORDER — HYDROCODONE BITARTRATE AND ACETAMINOPHEN 5; 325 MG/1; MG/1
2 TABLET ORAL EVERY 4 HOURS PRN
Status: DISCONTINUED | OUTPATIENT
Start: 2021-02-11 | End: 2021-02-13 | Stop reason: HOSPADM

## 2021-02-11 RX ORDER — ROCURONIUM BROMIDE 10 MG/ML
INJECTION, SOLUTION INTRAVENOUS PRN
Status: DISCONTINUED | OUTPATIENT
Start: 2021-02-11 | End: 2021-02-11 | Stop reason: SDUPTHER

## 2021-02-11 RX ORDER — OXYCODONE HYDROCHLORIDE AND ACETAMINOPHEN 5; 325 MG/1; MG/1
1 TABLET ORAL PRN
Status: DISCONTINUED | OUTPATIENT
Start: 2021-02-11 | End: 2021-02-11

## 2021-02-11 RX ORDER — LIDOCAINE HYDROCHLORIDE ANHYDROUS AND DEXTROSE MONOHYDRATE .4; 5 G/100ML; G/100ML
INJECTION, SOLUTION INTRAVENOUS CONTINUOUS PRN
Status: DISCONTINUED | OUTPATIENT
Start: 2021-02-11 | End: 2021-02-11 | Stop reason: SDUPTHER

## 2021-02-11 RX ORDER — SCOLOPAMINE TRANSDERMAL SYSTEM 1 MG/1
1 PATCH, EXTENDED RELEASE TRANSDERMAL
Status: DISCONTINUED | OUTPATIENT
Start: 2021-02-11 | End: 2021-02-11 | Stop reason: HOSPADM

## 2021-02-11 RX ORDER — SODIUM CHLORIDE 0.9 % (FLUSH) 0.9 %
10 SYRINGE (ML) INJECTION EVERY 12 HOURS SCHEDULED
Status: DISCONTINUED | OUTPATIENT
Start: 2021-02-11 | End: 2021-02-13 | Stop reason: HOSPADM

## 2021-02-11 RX ORDER — ONDANSETRON 2 MG/ML
4 INJECTION INTRAMUSCULAR; INTRAVENOUS
Status: DISCONTINUED | OUTPATIENT
Start: 2021-02-11 | End: 2021-02-11 | Stop reason: HOSPADM

## 2021-02-11 RX ORDER — DEXAMETHASONE SODIUM PHOSPHATE 4 MG/ML
INJECTION, SOLUTION INTRA-ARTICULAR; INTRALESIONAL; INTRAMUSCULAR; INTRAVENOUS; SOFT TISSUE PRN
Status: DISCONTINUED | OUTPATIENT
Start: 2021-02-11 | End: 2021-02-11 | Stop reason: SDUPTHER

## 2021-02-11 RX ORDER — SENNA AND DOCUSATE SODIUM 50; 8.6 MG/1; MG/1
1 TABLET, FILM COATED ORAL 2 TIMES DAILY
Status: DISCONTINUED | OUTPATIENT
Start: 2021-02-11 | End: 2021-02-13 | Stop reason: HOSPADM

## 2021-02-11 RX ORDER — ASCORBIC ACID 500 MG
1000 TABLET ORAL DAILY
Status: DISCONTINUED | OUTPATIENT
Start: 2021-02-11 | End: 2021-02-13 | Stop reason: HOSPADM

## 2021-02-11 RX ORDER — PROPOFOL 10 MG/ML
INJECTION, EMULSION INTRAVENOUS CONTINUOUS PRN
Status: DISCONTINUED | OUTPATIENT
Start: 2021-02-11 | End: 2021-02-11 | Stop reason: SDUPTHER

## 2021-02-11 RX ORDER — BUPIVACAINE HYDROCHLORIDE AND EPINEPHRINE 2.5; 5 MG/ML; UG/ML
INJECTION, SOLUTION EPIDURAL; INFILTRATION; INTRACAUDAL; PERINEURAL PRN
Status: DISCONTINUED | OUTPATIENT
Start: 2021-02-11 | End: 2021-02-11 | Stop reason: ALTCHOICE

## 2021-02-11 RX ORDER — KETAMINE HYDROCHLORIDE 100 MG/ML
INJECTION, SOLUTION INTRAMUSCULAR; INTRAVENOUS PRN
Status: DISCONTINUED | OUTPATIENT
Start: 2021-02-11 | End: 2021-02-11 | Stop reason: SDUPTHER

## 2021-02-11 RX ORDER — LIDOCAINE HYDROCHLORIDE 20 MG/ML
INJECTION, SOLUTION INFILTRATION; PERINEURAL PRN
Status: DISCONTINUED | OUTPATIENT
Start: 2021-02-11 | End: 2021-02-11 | Stop reason: SDUPTHER

## 2021-02-11 RX ORDER — SODIUM CHLORIDE 0.9 % (FLUSH) 0.9 %
10 SYRINGE (ML) INJECTION PRN
Status: DISCONTINUED | OUTPATIENT
Start: 2021-02-11 | End: 2021-02-13 | Stop reason: HOSPADM

## 2021-02-11 RX ORDER — GABAPENTIN 300 MG/1
300 CAPSULE ORAL 3 TIMES DAILY
Qty: 90 CAPSULE | Refills: 0 | Status: SHIPPED | OUTPATIENT
Start: 2021-02-11 | End: 2021-03-24

## 2021-02-11 RX ORDER — MAGNESIUM SULFATE IN WATER 40 MG/ML
4000 INJECTION, SOLUTION INTRAVENOUS ONCE
Status: DISCONTINUED | OUTPATIENT
Start: 2021-02-11 | End: 2021-02-11 | Stop reason: HOSPADM

## 2021-02-11 RX ORDER — SODIUM CHLORIDE 0.9 % (FLUSH) 0.9 %
10 SYRINGE (ML) INJECTION EVERY 12 HOURS SCHEDULED
Status: DISCONTINUED | OUTPATIENT
Start: 2021-02-11 | End: 2021-02-11 | Stop reason: HOSPADM

## 2021-02-11 RX ORDER — BUPROPION HYDROCHLORIDE 150 MG/1
300 TABLET ORAL EVERY MORNING
Status: DISCONTINUED | OUTPATIENT
Start: 2021-02-12 | End: 2021-02-13 | Stop reason: HOSPADM

## 2021-02-11 RX ORDER — ONDANSETRON 2 MG/ML
4 INJECTION INTRAMUSCULAR; INTRAVENOUS EVERY 6 HOURS PRN
Status: DISCONTINUED | OUTPATIENT
Start: 2021-02-11 | End: 2021-02-13 | Stop reason: HOSPADM

## 2021-02-11 RX ORDER — SODIUM CHLORIDE 9 MG/ML
INJECTION, SOLUTION INTRAVENOUS PRN
Status: DISCONTINUED | OUTPATIENT
Start: 2021-02-11 | End: 2021-02-13 | Stop reason: HOSPADM

## 2021-02-11 RX ORDER — SODIUM CHLORIDE, SODIUM LACTATE, POTASSIUM CHLORIDE, CALCIUM CHLORIDE 600; 310; 30; 20 MG/100ML; MG/100ML; MG/100ML; MG/100ML
INJECTION, SOLUTION INTRAVENOUS CONTINUOUS
Status: DISCONTINUED | OUTPATIENT
Start: 2021-02-11 | End: 2021-02-11

## 2021-02-11 RX ADMIN — CEFAZOLIN SODIUM 2000 MG: 10 INJECTION, POWDER, FOR SOLUTION INTRAVENOUS at 22:00

## 2021-02-11 RX ADMIN — HYDROCODONE BITARTRATE AND ACETAMINOPHEN 2 TABLET: 5; 325 TABLET ORAL at 17:28

## 2021-02-11 RX ADMIN — LISINOPRIL 40 MG: 20 TABLET ORAL at 17:28

## 2021-02-11 RX ADMIN — FENTANYL CITRATE 50 MCG: 50 INJECTION, SOLUTION INTRAMUSCULAR; INTRAVENOUS at 12:53

## 2021-02-11 RX ADMIN — LIDOCAINE HYDROCHLORIDE 3 ML: 40 SOLUTION TOPICAL at 07:41

## 2021-02-11 RX ADMIN — ONDANSETRON 4 MG: 2 INJECTION INTRAMUSCULAR; INTRAVENOUS at 07:39

## 2021-02-11 RX ADMIN — HYDROMORPHONE HYDROCHLORIDE 0.5 MG: 1 INJECTION, SOLUTION INTRAMUSCULAR; INTRAVENOUS; SUBCUTANEOUS at 15:22

## 2021-02-11 RX ADMIN — Medication 2000 UNITS: at 17:28

## 2021-02-11 RX ADMIN — APREPITANT 40 MG: 40 CAPSULE ORAL at 07:10

## 2021-02-11 RX ADMIN — SUGAMMADEX 108 MG: 100 INJECTION, SOLUTION INTRAVENOUS at 11:01

## 2021-02-11 RX ADMIN — CEFAZOLIN SODIUM 2 G: 10 INJECTION, POWDER, FOR SOLUTION INTRAVENOUS at 11:27

## 2021-02-11 RX ADMIN — SODIUM CHLORIDE, POTASSIUM CHLORIDE, SODIUM LACTATE AND CALCIUM CHLORIDE: 600; 310; 30; 20 INJECTION, SOLUTION INTRAVENOUS at 12:43

## 2021-02-11 RX ADMIN — KETAMINE HYDROCHLORIDE 30 MG: 100 INJECTION, SOLUTION INTRAMUSCULAR; INTRAVENOUS at 10:46

## 2021-02-11 RX ADMIN — MAGNESIUM GLUCONATE 500 MG ORAL TABLET 400 MG: 500 TABLET ORAL at 22:00

## 2021-02-11 RX ADMIN — ROCURONIUM BROMIDE 10 MG: 10 SOLUTION INTRAVENOUS at 09:08

## 2021-02-11 RX ADMIN — ROCURONIUM BROMIDE 30 MG: 10 SOLUTION INTRAVENOUS at 07:39

## 2021-02-11 RX ADMIN — ROCURONIUM BROMIDE 10 MG: 10 SOLUTION INTRAVENOUS at 09:32

## 2021-02-11 RX ADMIN — LIDOCAINE HYDROCHLORIDE 1 MG/MIN: 4 INJECTION, SOLUTION INTRAVENOUS at 07:39

## 2021-02-11 RX ADMIN — DEXAMETHASONE SODIUM PHOSPHATE 12 MG: 4 INJECTION, SOLUTION INTRAMUSCULAR; INTRAVENOUS at 07:39

## 2021-02-11 RX ADMIN — HYDROMORPHONE HYDROCHLORIDE 0.5 MG: 1 INJECTION, SOLUTION INTRAMUSCULAR; INTRAVENOUS; SUBCUTANEOUS at 14:57

## 2021-02-11 RX ADMIN — PROPOFOL 200 MCG/KG/MIN: 10 INJECTION, EMULSION INTRAVENOUS at 11:20

## 2021-02-11 RX ADMIN — KETAMINE HYDROCHLORIDE 20 MG: 100 INJECTION, SOLUTION INTRAMUSCULAR; INTRAVENOUS at 10:12

## 2021-02-11 RX ADMIN — PHENYLEPHRINE HYDROCHLORIDE 10 MCG/MIN: 10 INJECTION INTRAVENOUS at 08:45

## 2021-02-11 RX ADMIN — METHOCARBAMOL 1 G: 100 INJECTION INTRAMUSCULAR; INTRAVENOUS at 11:00

## 2021-02-11 RX ADMIN — Medication 100 MCG: at 09:50

## 2021-02-11 RX ADMIN — PROPOFOL 170 MG: 10 INJECTION, EMULSION INTRAVENOUS at 07:39

## 2021-02-11 RX ADMIN — LIDOCAINE HYDROCHLORIDE 100 MG: 20 INJECTION, SOLUTION INFILTRATION; PERINEURAL at 07:39

## 2021-02-11 RX ADMIN — SODIUM CHLORIDE, POTASSIUM CHLORIDE, SODIUM LACTATE AND CALCIUM CHLORIDE: 600; 310; 30; 20 INJECTION, SOLUTION INTRAVENOUS at 07:11

## 2021-02-11 RX ADMIN — FENTANYL CITRATE 100 MCG: 50 INJECTION, SOLUTION INTRAMUSCULAR; INTRAVENOUS at 08:05

## 2021-02-11 RX ADMIN — HYDROCODONE BITARTRATE AND ACETAMINOPHEN 2 TABLET: 5; 325 TABLET ORAL at 22:00

## 2021-02-11 RX ADMIN — Medication 50 MCG: at 09:46

## 2021-02-11 RX ADMIN — KETAMINE HYDROCHLORIDE 30 MG: 100 INJECTION, SOLUTION INTRAMUSCULAR; INTRAVENOUS at 08:45

## 2021-02-11 RX ADMIN — SODIUM CHLORIDE: 9 INJECTION, SOLUTION INTRAVENOUS at 11:46

## 2021-02-11 RX ADMIN — AMLODIPINE BESYLATE 10 MG: 5 TABLET ORAL at 17:28

## 2021-02-11 RX ADMIN — FENTANYL CITRATE 50 MCG: 50 INJECTION, SOLUTION INTRAMUSCULAR; INTRAVENOUS at 11:20

## 2021-02-11 RX ADMIN — DOCUSATE SODIUM 50MG AND SENNOSIDES 8.6MG 1 TABLET: 8.6; 5 TABLET, FILM COATED ORAL at 22:00

## 2021-02-11 RX ADMIN — Medication 100 MCG: at 08:35

## 2021-02-11 RX ADMIN — KETAMINE HYDROCHLORIDE 20 MG: 100 INJECTION, SOLUTION INTRAMUSCULAR; INTRAVENOUS at 12:53

## 2021-02-11 RX ADMIN — FENTANYL CITRATE 50 MCG: 50 INJECTION, SOLUTION INTRAMUSCULAR; INTRAVENOUS at 10:02

## 2021-02-11 RX ADMIN — SODIUM CHLORIDE: 9 INJECTION, SOLUTION INTRAVENOUS at 07:50

## 2021-02-11 RX ADMIN — CEFAZOLIN SODIUM 2 G: 10 INJECTION, POWDER, FOR SOLUTION INTRAVENOUS at 07:39

## 2021-02-11 RX ADMIN — Medication 10 ML: at 22:00

## 2021-02-11 RX ADMIN — ROCURONIUM BROMIDE 30 MG: 10 SOLUTION INTRAVENOUS at 08:05

## 2021-02-11 RX ADMIN — HYDROMORPHONE HYDROCHLORIDE 0.5 MG: 1 INJECTION, SOLUTION INTRAMUSCULAR; INTRAVENOUS; SUBCUTANEOUS at 16:20

## 2021-02-11 RX ADMIN — ROCURONIUM BROMIDE 20 MG: 10 SOLUTION INTRAVENOUS at 09:55

## 2021-02-11 ASSESSMENT — PULMONARY FUNCTION TESTS
PIF_VALUE: 14
PIF_VALUE: 14
PIF_VALUE: 16
PIF_VALUE: 14
PIF_VALUE: 16
PIF_VALUE: 17
PIF_VALUE: 16
PIF_VALUE: 15
PIF_VALUE: 14
PIF_VALUE: 14
PIF_VALUE: 15
PIF_VALUE: 15
PIF_VALUE: 14
PIF_VALUE: 15
PIF_VALUE: 15
PIF_VALUE: 16
PIF_VALUE: 16
PIF_VALUE: 15
PIF_VALUE: 14
PIF_VALUE: 16
PIF_VALUE: 16
PIF_VALUE: 15
PIF_VALUE: 18
PIF_VALUE: 14
PIF_VALUE: 15
PIF_VALUE: 14
PIF_VALUE: 17
PIF_VALUE: 16
PIF_VALUE: 15
PIF_VALUE: 16
PIF_VALUE: 15
PIF_VALUE: 16
PIF_VALUE: 16
PIF_VALUE: 15
PIF_VALUE: 14
PIF_VALUE: 16
PIF_VALUE: 0
PIF_VALUE: 18
PIF_VALUE: 16
PIF_VALUE: 15
PIF_VALUE: 15
PIF_VALUE: 16
PIF_VALUE: 15
PIF_VALUE: 8
PIF_VALUE: 16
PIF_VALUE: 16
PIF_VALUE: 15
PIF_VALUE: 15
PIF_VALUE: 16
PIF_VALUE: 14
PIF_VALUE: 15
PIF_VALUE: 15
PIF_VALUE: 14
PIF_VALUE: 16
PIF_VALUE: 7
PIF_VALUE: 16
PIF_VALUE: 14
PIF_VALUE: 15
PIF_VALUE: 2
PIF_VALUE: 15
PIF_VALUE: 16
PIF_VALUE: 15
PIF_VALUE: 14
PIF_VALUE: 15
PIF_VALUE: 15
PIF_VALUE: 18
PIF_VALUE: 16
PIF_VALUE: 2
PIF_VALUE: 15
PIF_VALUE: 16
PIF_VALUE: 15
PIF_VALUE: 2
PIF_VALUE: 15
PIF_VALUE: 15
PIF_VALUE: 17
PIF_VALUE: 16
PIF_VALUE: 15
PIF_VALUE: 14
PIF_VALUE: 15
PIF_VALUE: 15
PIF_VALUE: 16
PIF_VALUE: 14
PIF_VALUE: 15
PIF_VALUE: 16
PIF_VALUE: 2
PIF_VALUE: 16
PIF_VALUE: 15
PIF_VALUE: 16
PIF_VALUE: 16
PIF_VALUE: 2
PIF_VALUE: 16
PIF_VALUE: 15
PIF_VALUE: 16
PIF_VALUE: 15
PIF_VALUE: 2
PIF_VALUE: 16
PIF_VALUE: 16
PIF_VALUE: 15
PIF_VALUE: 15
PIF_VALUE: 16
PIF_VALUE: 14
PIF_VALUE: 15
PIF_VALUE: 16
PIF_VALUE: 15
PIF_VALUE: 16
PIF_VALUE: 16
PIF_VALUE: -6
PIF_VALUE: 14
PIF_VALUE: 15
PIF_VALUE: 16
PIF_VALUE: 15
PIF_VALUE: 14
PIF_VALUE: 14
PIF_VALUE: 17
PIF_VALUE: 15
PIF_VALUE: 2
PIF_VALUE: 16
PIF_VALUE: 15
PIF_VALUE: 18
PIF_VALUE: 15
PIF_VALUE: 17
PIF_VALUE: 16
PIF_VALUE: 15
PIF_VALUE: 1
PIF_VALUE: 15
PIF_VALUE: 14
PIF_VALUE: 15
PIF_VALUE: 18
PIF_VALUE: 17
PIF_VALUE: 14
PIF_VALUE: 14
PIF_VALUE: 15
PIF_VALUE: 15
PIF_VALUE: 14
PIF_VALUE: 14
PIF_VALUE: 17
PIF_VALUE: 2
PIF_VALUE: 14
PIF_VALUE: 14
PIF_VALUE: 16
PIF_VALUE: 14
PIF_VALUE: 15
PIF_VALUE: 14
PIF_VALUE: 15
PIF_VALUE: 16
PIF_VALUE: 14
PIF_VALUE: 15
PIF_VALUE: 14
PIF_VALUE: 14
PIF_VALUE: 16
PIF_VALUE: 17
PIF_VALUE: 16
PIF_VALUE: 14
PIF_VALUE: 16
PIF_VALUE: 14
PIF_VALUE: 15
PIF_VALUE: 16
PIF_VALUE: 14
PIF_VALUE: 14
PIF_VALUE: 16
PIF_VALUE: 15
PIF_VALUE: 14
PIF_VALUE: 14
PIF_VALUE: 15
PIF_VALUE: 17
PIF_VALUE: 14
PIF_VALUE: 17
PIF_VALUE: 14
PIF_VALUE: 15
PIF_VALUE: 16
PIF_VALUE: 17
PIF_VALUE: 14
PIF_VALUE: 14
PIF_VALUE: 15
PIF_VALUE: 16
PIF_VALUE: 16
PIF_VALUE: 14
PIF_VALUE: 16
PIF_VALUE: 15
PIF_VALUE: 2
PIF_VALUE: 14
PIF_VALUE: 15
PIF_VALUE: 2
PIF_VALUE: 14
PIF_VALUE: 15
PIF_VALUE: 15
PIF_VALUE: 14
PIF_VALUE: -6
PIF_VALUE: 16
PIF_VALUE: 2
PIF_VALUE: 15
PIF_VALUE: -1
PIF_VALUE: 14
PIF_VALUE: 16
PIF_VALUE: 8
PIF_VALUE: 1

## 2021-02-11 ASSESSMENT — PAIN SCALES - GENERAL
PAINLEVEL_OUTOF10: 0
PAINLEVEL_OUTOF10: 10
PAINLEVEL_OUTOF10: 7
PAINLEVEL_OUTOF10: 9

## 2021-02-11 ASSESSMENT — PAIN DESCRIPTION - FREQUENCY: FREQUENCY: CONTINUOUS

## 2021-02-11 ASSESSMENT — PAIN DESCRIPTION - PAIN TYPE
TYPE: CHRONIC PAIN
TYPE: SURGICAL PAIN

## 2021-02-11 ASSESSMENT — PAIN DESCRIPTION - ONSET: ONSET: ON-GOING

## 2021-02-11 ASSESSMENT — PAIN DESCRIPTION - PROGRESSION: CLINICAL_PROGRESSION: NOT CHANGED

## 2021-02-11 ASSESSMENT — PAIN - FUNCTIONAL ASSESSMENT: PAIN_FUNCTIONAL_ASSESSMENT: PREVENTS OR INTERFERES SOME ACTIVE ACTIVITIES AND ADLS

## 2021-02-11 NOTE — H&P
I have reviewed the history and physical and examined the patient and find no relevant changes. I have reviewed with the patient and/or family the risks, benefits, and alternatives to the procedure. The patient was counseled at length about the risks of federica Covid-19 during their perioperative period and any recovery window from their procedure. The patient was made aware that federica Covid-19  may worsen their prognosis for recovering from their procedure  and lend to a higher morbidity and/or mortality risk. All material risks, benefits, and reasonable alternatives including postponing the procedure were discussed. The patient does wish to proceed with the procedure at this time. Peter Ambrocio MD  2/11/2021 No intake/output data recorded.

## 2021-02-11 NOTE — PROGRESS NOTES
Arrived to [facility], consent signed at this time NPO since 2300, belongings will be placed on cart.

## 2021-02-11 NOTE — PROGRESS NOTES
Report given at this time, patient stable, care transferred to [facility].

## 2021-02-11 NOTE — PROGRESS NOTES
Patient given dilaudid for c/o 10/10 pain. Bear hugger placed for c/o being cold. Pt now resting with eyes closed and RR >10.

## 2021-02-11 NOTE — LETTER
Name: KARLA CRUZ  : 1955  Diagnosis: Degenerative scoliosis with severe spinal stenosis L2-S1  Surgeon: Bucky Howard    DOS: 21    Procedure:    Anterior  Harry Vale  1. 16297-83 anterior interbody fusion L2-3  2. 81827-78 anterior interbody fusion L3-4  3. 45283-81 anterior interbody fusion L5/S1  4. 49834 synthetic cage L2-3  5. 85024-97 synthetic cage L3-4   6. 25710-59 synthetic cage L5/S1    Posterior  Amber PERAZA  1. 50748-99 Posterior fusion L2-3  2. 09136 Posterior fusion L3-4  3. 82809 Posterior fusion L5/S1  4. 55795 Lumbar pedicle screw instrumentation L2 to S1  5. 95815 local bone graft

## 2021-02-11 NOTE — LETTER
Name: KARLA CRUZ  : 1955  Diagnosis: Degenerative spondylolisthesis,  scoliosis with severe spinal stenosis L2-S! Surgeon: Alex Howard    DOS: 21    Procedure:    Anterior  Cosurgeon Paul Vale  1. 00251-28 anterior interbody fusion L2-3  2. 29990-04 anterior interbody fusion L3-4  2. 74030-30 anterior interbody fusion L5-S1  3. 90641 synthetic cage L2-3  4. 16812-29 synthetic cage L3-4  4. 64081-30 synthetic cage L5/S1    Posterior  Assistant Franklin Goel PAC  1. 64849 Posterior fusion L2-3  2. 07544 Posterior fusion L3-4  3. 86681 Posterior fusion L5-S1  4. 75142 Lumbar pedicle screw instrumentation L2 to S1

## 2021-02-11 NOTE — OP NOTE
Operative Note      Patient: Evie Castleman  YOB: 1955  MRN: [id number]    Date of Procedure: 2/11/2021    Pre-Op Diagnosis: DEGENERATIVE SCOLIOSIS    Post-Op Diagnosis: Same       Procedure(s):    POSTERIOR  FUSION L2-S1 WITH MEDTRONIC RODS AND SCREWS WITH REMOVAL OF CLARITA AND SCREW L4-5 USING MEDTRONICS, EVOKES AND O-ARM  CPT CODE - [place] 7069, 35249-21, 63269-79, 56008-48, 07291, 48807, 95938, [address] [facility], 20518-28, 42157, 51217, 47237    Surgeon(s):  Jonathan Herr MD      Assistant:   Surgical Assistant: Conard Dubin; Neymar Monreal  Physician Assistant: Marlene Monroy PA-C    Anesthesia: General    Estimated Blood Loss (mL): 630     Complications: None    Specimens:   * No specimens in log *    Implants:  Implant Name Type Inv. Item Serial No.  Lot No. LRB No. Used Action   KIT BNE GRFT M RHBMP-2 4.2MG INJ 5ML CONTAIN NDL 20GA  KIT BNE GRFT M RHBMP-2 4.2MG INJ 5ML CONTAIN NDL 20GA  [employer]WD [id number] N/A 1 Implanted   SPACER SPNL 12 DEG L32 MM X W23 MM X H10 MM  SPACER SPNL 12 DEG L32 MM X W23 MM X H10 MM  MEDTRONIC [place] [employer]WD [id number] N/A 1 Implanted   SPACER SPNL [id number] 12DEG M TI ANT LUM INTBDY FUS SCR  SPACER SPNL [id number] 12DEG M TI ANT LUM INTBDY FUS SCR  MEDTRONIC [place] [employer]WD 59AY N/A 1 Implanted   SPACER SPNL 12 DEG L32 MM X W23 MM X H10 MM  SPACER SPNL 12 DEG L32 MM X W23 MM X H10 MM  MEDTRONIC [place] [employer]WD 05FN N/A 1 Implanted   SCREW SPNL L25MM [id number] TI FIX ANG SOVEREIGN  SCREW SPNL L25MM [id number] TI FIX ANG SOVEREIGN  [employer] [place] [employer]WD  N/A 6 Implanted   SCREW SPNL L30MM [id number] TI FIX ANG SOVEREIGN  SCREW SPNL L30MM [id number] TI FIX ANG SOVEREIGN  [employer]TRON[employer] [place] [employer]WD  N/A 2 Implanted   SCREW SPNL L25MM DIA6MM TI FIX ANG SOVEREIGN  SCREW SPNL L25MM DIA6MM TI FIX ANG SOVEREIGN  [employer] [place] [employer]WD  N/A 12 Implanted   SET SCR SPNL L6MM DIA5. 5MM TI BRK OFF VIPIN W/ DETACH [address]  SET SCR SPNL L6MM DIA5. 5MM TI BRK OFF VIPIN W/ DETACH [address] [facility]  MEDTRONIC [employer]FOR DANEK-WD  N/A 8 Implanted   SCREW SPNL L45MM DIA6. 5MM POST THORACOLUMBOSACRAL CO CHROM  SCREW SPNL L45MM DIA6. 5MM POST THORACOLUMBOSACRAL CO CHROM  MEDTRONIC SOFAMOR DANEK-WD  N/A 13 Implanted   SCREW SPNL L50MM DIA6. 5MM POST THORACOLUMBOSACRAL CO CHROM  SCREW SPNL L50MM DIA6. 5MM POST THORACOLUMBOSACRAL CO CHROM  MEDTRONIC SOFAMOR DANEK-WD  N/A 2 Implanted   SCREW SPNL L40MM DIA6. 5MM POST THORACOLUMBOSACRAL CO CHROM  SCREW SPNL L40MM DIA6. 5MM POST THORACOLUMBOSACRAL CO CHROM  MEDTRONIC SOFAMOR DANEK-WD  N/A 1 Implanted   SCREW SPNL L35MM DIA6. 5MM POST THORACOLUMBOSACRAL CO CHROM [[id number]] [address] [facility]  SCREW SPNL L35MM DIA6. 5MM POST THORACOLUMBOSACRAL CO CHROM [[id number]] [MEDTRONIC SOFAMOR DANEK]  MEDTRONIC SOFAMOR DANEK-WD  N/A 1 Implanted   SCREW SPNL L45MM DIA7. 5MM POST THORACOLUMBOSACRAL CO CHROM  SCREW SPNL L45MM DIA7. 5MM POST THORACOLUMBOSACRAL CO CHROM  MEDTRONIC SOFAMOR DANEK-WD  N/A 1 Implanted   CLARITA SPNL L100MM DIA5. 5MM TI ANT POST THORACOLUMBOSACRAL CRV  CLARITA SPNL L100MM DIA5. 5MM TI ANT POST THORACOLUMBOSACRAL CRV  MEDTRONIC SOFAMOR DANEK-WD  N/A 1 Implanted   CLARITA SPNL L110MM DIA5. 5MM TI ANT POST THORACOLUMBOSACRAL CRV  CLARITA SPNL L110MM DIA5. 5MM TI ANT POST THORACOLUMBOSACRAL CRV  MEDTRONIC SOFAMOR DANEK-WD  N/A 1 Implanted         Drains:   Urethral Catheter Non-latex 16 fr (Active)       Findings: scoliosis    Detailed Description of Procedure: Indications: Israel Sher is a 76year old female with low back and right greater than left leg pain. She is s/p L4-5 fusion. Her symptoms failed to respond to conservative care. Her MRI showed degenerative scoliosis with spinal stenosis L2-S1. DETAILS OF PROCEDURE:  The patient placed prone on a Amos table while under general anesthesia from her anterior fusion. All bony prominences were inspected and padded prior to sterile draping.  Using a #10 blade knife, the skin over the right iliac creat was incised and a Stealth reference arc was impacted into the iliac crest. Images were obtained with the O-arm. Using a #10 blade knife the skin was incised through her midline scar from L2 to S1. I identified and removed her L4-5 pedicle screws and rods. I fastened a reference array to the spinous process of L2 and we performed a navigation spin. Using the stealth probe the entrance to the L2, L3, L4, L5 and S1 pedicles were identified bilaterally. A 4.5 to 5.5mm navigated tap was inserted into the pedicle those pedicles. 45mm by 6.5mm legacy screws were inserted into the L2 and L3 pedicles, bilaterally, the left L4 pedcile, the right L5 pedicle and the S1 pedicles bilaterally. No free run emg activity was recorded. The screws were stimulated and both screws needed more than 20mA to record activity. I decorticated the posterior elements of L2, L3, L4, L5 and S1 and applied local bone over those areas. I pseed 100mm rods from L2 to S1 bilaterally. The set screws were inserted and the screws compressed before the shanita was tightened. A repeat O-arm spin showed good position of the screws, rods, cages and bone graft. The wounds were copiously irrigated with antibiotic solution. I placed 1 gram of vancomycin powder deep to the fascia. 0.5% Marcaine in subcutaneous tissues for analgesia. The fascia was then reapproximated using interrupted 0 Vicryl sutures and interrupted 3-0 Vicryl sutures followed by a 4-0 Vicryl subcuticular suture were used to reapproximate the subcuticular layesr. Sterile dressing were then applied. The patient was extubated in the operating room and transferred to the recovery room in stable condition. There were no complications. Charly Geiger M.D.     Electronically signed by Margarita Smith MD on 2/11/2021 at 1:45 PM

## 2021-02-11 NOTE — OP NOTE
THORACOLUMBOSACRAL CO CHROM  MEDTRONIC SOFAMOR DANEK-WD  N/A 13 Implanted   SCREW SPNL L50MM DIA6. 5MM POST THORACOLUMBOSACRAL CO CHROM  SCREW SPNL L50MM DIA6. 5MM POST THORACOLUMBOSACRAL CO CHROM  MEDTRONIC SOFAMOR DANEK-WD  N/A 2 Implanted   SCREW SPNL L40MM DIA6. 5MM POST THORACOLUMBOSACRAL CO CHROM  SCREW SPNL L40MM DIA6. 5MM POST THORACOLUMBOSACRAL CO CHROM  MEDTRONIC SOFAMOR DANEK-WD  N/A 1 Implanted   SCREW SPNL L35MM DIA6. 5MM POST THORACOLUMBOSACRAL CO CHROM [[id number]] [address] [facility]  SCREW SPNL L35MM DIA6. 5MM POST THORACOLUMBOSACRAL CO CHROM [[id number]] [MEDTRONIC SOFAMOR DANEK]  MEDTRONIC SOFAMOR DANEK-WD  N/A 1 Implanted   SCREW SPNL L45MM DIA7. 5MM POST THORACOLUMBOSACRAL CO CHROM  SCREW SPNL L45MM DIA7. 5MM POST THORACOLUMBOSACRAL CO CHROM  MEDTRONIC SOFAMOR DANEK-WD  N/A 1 Implanted   CLARITA SPNL L100MM DIA5. 5MM TI ANT POST THORACOLUMBOSACRAL CRV  CLARITA SPNL L100MM DIA5. 5MM TI ANT POST THORACOLUMBOSACRAL CRV  MEDTRONIC SOFAMOR DANEK-WD  N/A 1 Implanted   CLARITA SPNL L110MM DIA5. 5MM TI ANT POST THORACOLUMBOSACRAL CRV  CLARITA SPNL L110MM DIA5. 5MM TI ANT POST THORACOLUMBOSACRAL CRV  MEDTRONIC SOFAMOR DANEK-WD  N/A 1 Implanted         Drains:   Urethral Catheter Non-latex 16 fr (Active)       Findings: scoliosis    Detailed Description of Procedure:     INDICATIONS FOR SURGERY:   Kev Ortiz complained of severe low back pain and right greater than left leg pain. She is s/p L4-5 fusion. The symptoms failed to respond to conservative intervention. An MRI scan was performed and this showed evidence of severe degenerative disc disease and degenerative scoliosis with stenosis L2 to S1. Having failed conservative management and experiencing persistent symptoms, the patient elected to proceed ahead with the surgical option listed above. DETAILS OF PROCEDURE:  The patient was brought to the operating room, placed supine on the nancy table and placed under general anesthesia. The skin was prepped and draped. After Dr. Agnieszka Baptiste exposed the anterior L2-3, L3-4 and L5/S1 disc spaces, I palpated the spine. Pins were placed in the midline of L2-3, L3-4 and L5/S1 discs and fluoroscopy was used to confirm the level. The midline of each disc was marked with a bovie. Aided with loupe magnification and a headlight and a 10 blade knife I incised the edges of the annulus from the endplates of H5-3. I used a straight curette to free the disc from the endplates and the disc was grasped and removed with a pituitary. I removed the anterior inferior lip of the rostral vertebra with a #3 kerrison. I removed disc material back to the PLL with straight and angled curettes. Having completed the nerve decompression. I then used the sizers to determine the best size interbody cage to insert and to check the fit with fluoroscopy. A small Infuse filled 10mm cage was impacted into the disc space. I impacted a small Infused filled 10mm cage into the L3-4 and a medium infused 12mm cage into the L5-S1 interspace using the same method. AP and LAT fluoroscopy showed good position of the cages. I passed two 5.5 mm x 25mm screws through the L2-3 cage into L2, Three 5.5mm x 25mm screws through the L3-4 cage and into L3 and L4 and three 5.5mm x 25 mm screws through the L5-S1 cage into L5 and S1, Hemostasis was confirmed. Dr. Agnieszka Baptiste closed the incision. There were no complications. Charly Parrish M.D.               Electronically signed by Aquiles De Jesus MD on 2/11/2021 at 1:35 PM

## 2021-02-11 NOTE — OP NOTE
[address] [address]                                OPERATIVE REPORT    PATIENT NAME: Es Arnold                      :        1955  MED REC NO:   [id number]                          ROOM:  ACCOUNT NO:   [de-identified]                           ADMIT DATE: 2021  PROVIDER:     Kath Carlson MD    DATE OF PROCEDURE:  2021    PREOPERATIVE DIAGNOSIS:  Multilevel degenerative lumbar disc disease. POSTOPERATIVE DIAGNOSIS:  Multilevel degenerative lumbar disc disease. PROCEDURES:  Anterior lumbar interbody fusion at L2/3, L3/4, and L5/S1. SURGEON:  Kath Carlson MD    ANESTHESIA:  General endotracheal.    INDICATIONS:  The patient is a 13-year-old female who has previously  undergone posterior spinal fusion. She continues to have significant  progressive back and lower extremity pain. The patient is brought to  the operating room at this time to undergo multilevel anterior posterior  fusion procedure by Dr. Osiris Workman. I was asked to provide anterior exposure  and assistance. PROCEDURE:  The patient was brought to the operating room, placed in the  supine position. General endotracheal anesthesia was induced. Lateral  fluoroscopic x-rays were obtained in the levels of the L5-S1 interspace  as well as L3-4 and L2-3 disc spaces were marked anteriorly on the skin. The abdomen was then prepped and draped in sterile fashion. Midline  incision was made, carried down through the subcutaneous tissues to the  fascia. The left anterior rectus sheath was incised. The rectus muscle  was then retracted laterally. The retroperitoneal space was entered  below the arcuate line and the posterior rectus sheath was then split  slightly. The peritoneal cavity was mobilized entering the  retroperitoneal space. A small defect anteriorly in the peritoneal sac  was made. This was closed using 3-0 Vicryl running suture. Self-retaining retractors were placed. First the L5-S1 disc space was exposed, dividing and controlling middle  sacral vessels using bipolar cautery. Once this space was fully exposed  and then adjusted retractors and retracted the left common iliac artery  and aorta medially. This exposed several lumbar vessels both arterial  and venous, these were controlled using 3-0 silk ties, hemoclips and  bipolar cautery and was then able to retract the iliac artery and vein  laterally. There was one small defect made in the side branch coming  from the iliac vein. This was suture ligated using 6-0 Prolene suture  achieving excellent hemostasis. The L2-3 and L3-4 disc spaces were  identified and marked with spinal needle. Lateral fluoroscopic x-rays  were obtained confirming these levels. Diskectomy and placement of  intervertebral body implants were then performed at both L2-3 and L3-4  levels. Once final confirmatory x-rays were obtained, the retractors  were slowly released. There was one area of bleeding from a previously  ligated lumbar vein. This was controlled using a 3-0 Prolene suture  ligature achieving excellent hemostasis. The retractors were then  repositioned to expose the 5-1 disc space. Again diskectomy and  placement of an intervertebral body implant were performed. At the  conclusion, confirmatory x-rays were obtained and as they were  satisfactory all retractors were removed. The retroperitoneal area was  carefully inspected. Hemostasis was quite adequate. The midline fascia  was then reapproximated using running 0 looped PDS suture. Subcutaneous  tissues reapproximated using running 2-0 Vicryl suture and 3-0 Vicryl  sutures and the skin edges reapproximated using running 4-0 Monocryl  subcuticular sutures. The wound was then dressed with Steri-Strips and  a sterile occlusive dressing was then applied.   There were no

## 2021-02-12 LAB
HCT VFR BLD CALC: 25.1 % (ref 36–48)
HEMOGLOBIN: 8.5 G/DL (ref 12–16)
MCH RBC QN AUTO: 30.9 PG (ref 26–34)
MCHC RBC AUTO-ENTMCNC: 34 G/DL (ref 31–36)
MCV RBC AUTO: 90.9 FL (ref 80–100)
PDW BLD-RTO: 14 % (ref 12.4–15.4)
PLATELET # BLD: 290 K/UL (ref 135–450)
PMV BLD AUTO: 7.6 FL (ref 5–10.5)
RBC # BLD: 2.76 M/UL (ref 4–5.2)
WBC # BLD: 8.3 K/UL (ref 4–11)

## 2021-02-12 PROCEDURE — 2580000003 HC RX 258: Performed by: PHYSICIAN ASSISTANT

## 2021-02-12 PROCEDURE — 97110 THERAPEUTIC EXERCISES: CPT

## 2021-02-12 PROCEDURE — 6370000000 HC RX 637 (ALT 250 FOR IP): Performed by: PHYSICIAN ASSISTANT

## 2021-02-12 PROCEDURE — 6360000002 HC RX W HCPCS: Performed by: PHYSICIAN ASSISTANT

## 2021-02-12 PROCEDURE — 97166 OT EVAL MOD COMPLEX 45 MIN: CPT

## 2021-02-12 PROCEDURE — 36415 COLL VENOUS BLD VENIPUNCTURE: CPT

## 2021-02-12 PROCEDURE — 97116 GAIT TRAINING THERAPY: CPT

## 2021-02-12 PROCEDURE — 85027 COMPLETE CBC AUTOMATED: CPT

## 2021-02-12 PROCEDURE — 1200000000 HC SEMI PRIVATE

## 2021-02-12 PROCEDURE — 97530 THERAPEUTIC ACTIVITIES: CPT

## 2021-02-12 PROCEDURE — 97161 PT EVAL LOW COMPLEX 20 MIN: CPT

## 2021-02-12 RX ADMIN — DOCUSATE SODIUM 50MG AND SENNOSIDES 8.6MG 1 TABLET: 8.6; 5 TABLET, FILM COATED ORAL at 09:27

## 2021-02-12 RX ADMIN — HYDROCODONE BITARTRATE AND ACETAMINOPHEN 2 TABLET: 5; 325 TABLET ORAL at 14:21

## 2021-02-12 RX ADMIN — Medication 10 ML: at 09:33

## 2021-02-12 RX ADMIN — Medication 10 ML: at 05:21

## 2021-02-12 RX ADMIN — MAGNESIUM GLUCONATE 500 MG ORAL TABLET 400 MG: 500 TABLET ORAL at 09:28

## 2021-02-12 RX ADMIN — HYDROCODONE BITARTRATE AND ACETAMINOPHEN 2 TABLET: 5; 325 TABLET ORAL at 09:29

## 2021-02-12 RX ADMIN — MAGNESIUM GLUCONATE 500 MG ORAL TABLET 400 MG: 500 TABLET ORAL at 20:02

## 2021-02-12 RX ADMIN — HYDROCODONE BITARTRATE AND ACETAMINOPHEN 2 TABLET: 5; 325 TABLET ORAL at 18:31

## 2021-02-12 RX ADMIN — HYDROCODONE BITARTRATE AND ACETAMINOPHEN 2 TABLET: 5; 325 TABLET ORAL at 22:38

## 2021-02-12 RX ADMIN — HYDROCODONE BITARTRATE AND ACETAMINOPHEN 2 TABLET: 5; 325 TABLET ORAL at 05:18

## 2021-02-12 RX ADMIN — CEFAZOLIN SODIUM 2000 MG: 10 INJECTION, POWDER, FOR SOLUTION INTRAVENOUS at 05:00

## 2021-02-12 RX ADMIN — Medication 10 ML: at 20:02

## 2021-02-12 RX ADMIN — DOCUSATE SODIUM 50MG AND SENNOSIDES 8.6MG 1 TABLET: 8.6; 5 TABLET, FILM COATED ORAL at 20:02

## 2021-02-12 RX ADMIN — OXYCODONE HYDROCHLORIDE AND ACETAMINOPHEN 1000 MG: 500 TABLET ORAL at 09:27

## 2021-02-12 RX ADMIN — BUPROPION HYDROCHLORIDE 300 MG: 150 TABLET, EXTENDED RELEASE ORAL at 09:29

## 2021-02-12 RX ADMIN — Medication 2000 UNITS: at 09:28

## 2021-02-12 ASSESSMENT — PAIN SCALES - GENERAL
PAINLEVEL_OUTOF10: 9
PAINLEVEL_OUTOF10: 8
PAINLEVEL_OUTOF10: 8
PAINLEVEL_OUTOF10: 7
PAINLEVEL_OUTOF10: 5
PAINLEVEL_OUTOF10: 4
PAINLEVEL_OUTOF10: 6
PAINLEVEL_OUTOF10: 7

## 2021-02-12 ASSESSMENT — PAIN DESCRIPTION - LOCATION
LOCATION: BACK
LOCATION: BACK;ABDOMEN

## 2021-02-12 ASSESSMENT — PAIN DESCRIPTION - ORIENTATION: ORIENTATION: POSTERIOR;MID

## 2021-02-12 ASSESSMENT — PAIN DESCRIPTION - PAIN TYPE
TYPE: ACUTE PAIN;SURGICAL PAIN
TYPE: SURGICAL PAIN

## 2021-02-12 ASSESSMENT — PAIN DESCRIPTION - PROGRESSION: CLINICAL_PROGRESSION: NOT CHANGED

## 2021-02-12 ASSESSMENT — PAIN DESCRIPTION - ONSET: ONSET: ON-GOING

## 2021-02-12 ASSESSMENT — PAIN DESCRIPTION - FREQUENCY: FREQUENCY: CONTINUOUS

## 2021-02-12 ASSESSMENT — PAIN DESCRIPTION - DESCRIPTORS: DESCRIPTORS: DULL

## 2021-02-12 NOTE — PROGRESS NOTES
..4 Eyes Skin Assessment     NAME:  Isa Morrowkeeper OF BIRTH:  1955  MEDICAL RECORD NUMBER:  [id number]    The patient is being assess for  Post-Op Surgical    I agree that 2 RN's have performed a thorough Head to Toe Skin Assessment on the patient. ALL assessment sites listed below have been assessed. Areas assessed by both nurses:    Head, Face, Ears, Shoulders, Back, Chest, Arms, Elbows, Hands, Sacrum. Buttock, Coccyx, Ischium and Legs. Feet and Heels        Does the Patient have a Wound? Blanchable redness to buttocks.         Robbie Prevention initiated:  No   Wound Care Orders initiated:  No    Pressure Injury (Stage 3,4, Unstageable, DTI, NWPT, and Complex wounds) if present place consult order under [de-identified] No    New and Established Ostomies if present place consult order under : No      Nurse 1 eSignature: Electronically signed by Darci Harman on 2/12/21 at 7:25 AM EST    **SHARE this note so that the co-signing nurse is able to place an eSignature**    Nurse 2 eSignature: Electronically signed by Alexa Mahoney RN on 2/12/21 at 9:21 AM EST

## 2021-02-12 NOTE — PROGRESS NOTES
Physical Therapy    Facility/Department: [facility] C5 - MED SURG/ORTHO  Initial Assessment    NAME: Leonor Munoz  : 1955  MRN: [id number]    Date of Service: 2021    Discharge Recommendations:  Home with assist PRN   PT Equipment Recommendations  Equipment Needed: Yes  Mobility Devices: Porsche Plenty: Rolling  If pt discharges prior to next PT session this note will serve as discharge summary. Assessment   Body structures, Functions, Activity limitations: Decreased functional mobility ; Decreased endurance; Increased pain  Assessment: 75 yo female sp anterior interbody fusion L2- S1, Posterior laminectomy and fusion L2-S1. Pt has prior hx of back surgery. PLOF indep amb and ADLs. Today functioning below baseline and will benefit from skilled PT to address post op deficits. Recommend home assist prn and RW for home at discharge  Treatment Diagnosis: decreased mobility post back surgery  Specific instructions for Next Treatment: ex, gait  Prognosis: Good  Decision Making: Low Complexity  PT Education: Goals;Transfer Training;Disease Specific Education;PT Role;Functional Mobility Training;Plan of Care;General Safety;Home Exercise Program;Gait Training;Precautions  Patient Education: Disease Specific: pt educated in post op spinal precautinons, basic body mechanics for  bed mob/transfers/amb, use of RW. Pt voices understanding  Barriers to Learning: none  REQUIRES PT FOLLOW UP: Yes  Activity Tolerance: Patient Tolerated treatment well  Activity Tolerance: Post amb /61, HR 99, pt lightheaded and nauseated       Patient Diagnosis(es): The encounter diagnosis was S/P lumbar spinal fusion. has a past medical history of Arthritis, Closed fracture of metatarsal bone(s), Depression, HBP (high blood pressure), History of blood transfusion, PONV (postoperative nausea and vomiting), S/P insertion of spinal cord stimulator, and Spinal cord stimulator status.    has a past surgical history that includes Foot surgery (11/12/09); Hand surgery (Right, 02/22/2011); Hysterectomy; back surgery (2007); back surgery (10/03/2016); Carpal tunnel release (Right, 10/20/2016); Foot surgery (Right); and cyst removal (Left, 10/17/2017). Restrictions  Restrictions/Precautions: Fall Risk, Up as Tolerated, General Precautions  Position Activity Restriction  Spinal Precautions: No Bending, No Lifting, No Twisting  Vision/Hearing  Vision: Impaired  Vision Exceptions: Wears glasses at all times  Hearing: Within functional limits     Subjective  General  Referring Practitioner: Bill LOZANO for Dr. Bharat Handy  Referral Date : 02/11/21  Diagnosis: s/p anterior interbody fusion L2-3, L3-4, L4-5, L5-S1.  Posterior laminectomy and fusion L2-S1  Pain Screening  Patient Currently in Pain: Yes  Pain Assessment  Pain Assessment: 0-10  Pain Level: 6  Patient's Stated Pain Goal: 4  Pain Type: Surgical pain  Location: MID to low back, constant discomfort  Pt provided emotional support, increased activity, repositioned  Response to Pain Intervention: Patient Satisfied  Vital Signs  Pulse: 88  Heart Rate Source: Monitor  BP: 110/66  BP Location: Right upper arm  Oxygen Therapy  SpO2: 97 %       Orientation  Overall Orientation Status: Within Normal Limits  Social/Functional History  Social/Functional History  Lives With: Daughter  Type of Home: House  Home Layout: One level  Home Access: Stairs to enter with rails(3)  Bathroom Shower/Tub: Tub/Shower unit(son to install grab bars)  Bathroom Toilet: Handicap height  ADL Assistance: Independent  Ambulation Assistance: Independent  Transfer Assistance: Independent       Objective  AROM: WFL BLEs  STRENGTH BLEs: WFL   Bed mobility  Supine to Sit: Minimal assistance  Comment: pt instructed in log roll technique to protect spine during bed mob  Transfers  Sit to Stand: Minimal Assistance  Stand to sit: Minimal Assistance  Bed to Chair: Minimal assistance  Ambulation  Device: Rolling Walker  Assistance: Minimal assistance  Quality of Gait: slow pace, shortened stride  Distance: 15 ft x 2  Comments: Pt nauseated, became quiet with increased difficulty processing commands during ambulation. Returned to bed with mild drop in BP. Returned to normal behavior and decreased nausea reported once supine. RN informed   Exercises  Quad Sets: 10 x B  Gluteal Sets: 10 x B  Ankle Pumps: 10 x B     Plan   Times per week: 7 days wk  Specific instructions for Next Treatment: ex, gait  Current Treatment Recommendations: Strengthening, Gait Training, Safety Education & Training, Home Exercise Program, Pain Management, Functional Mobility Training, Endurance Training, Transfer Training  Safety Devices  Type of devices:  All fall risk precautions in place, Gait belt, Bed alarm in place, Nurse notified, Patient at risk for falls, Call light within reach, Left in bed    AM-PAC Score  AM-PAC Inpatient Mobility Raw Score : 18 (02/12/21 0909)  AM-PAC Inpatient T-Scale Score : 43.63 (02/12/21 0909)  Mobility Inpatient CMS 0-100% Score: 46.58 (02/12/21 0909)  Mobility Inpatient CMS G-Code Modifier : CK (02/12/21 0909)   Goals  Short term goals  Time Frame for Short term goals: 2-3 days (2/14)  Short term goal 1: pt to perform bed mob with SBA  Short term goal 2: pt to perform transfers SBA  Short term goal 3: pt to amb with  ft SBA  Short term goal 4: pt to voice understanding of HEP and post op spinal precautions- MET  Patient Goals   Patient goals : \"to know my precautions after spine surgery\"- MET   Therapy Time   Individual Concurrent Group Co-treatment   Time In 9940         Time Out 0916         Minutes 38         Timed Code Treatment Minutes: [address], PT

## 2021-02-12 NOTE — ANESTHESIA POSTPROCEDURE EVALUATION
Department of Anesthesiology  Postprocedure Note    Patient: Luis Cai  MRN: [id number]  YOB: 1955  Date of evaluation: 2/11/2021    Procedure Summary     Date: 02/11/21 Room / Location: [facility]    Anesthesia Start: 0730 Anesthesia Stop: 6165    Procedures:       ANTERIOR LUMBAR INTERBODY FUSION L2-3, L3-4, L5-S1 WITH MEDTRONIC CAGE AND INFUSE (N/A Abdomen)      POSTERIOR LAMINECTOMY AND FUSION L2-S1 WITH MEDTRONIC RODS AND SCREWS WITH REMOVAL OF CLARITA AND SCREW L4-5 USING MEDTRONICS, EVOKES AND O-ARM  CPT CODE - 07015-62, 95121-04, 86941-70, 12776-77, 77905, 42406, 47797, 73902, 66330-30, 42392, 75069, 90018 (N/A Spine Lumbar) Diagnosis:       Degenerative scoliosis      (DEGENERATIVE SCOLIOSIS)    Surgeons: Maria Ines Greer MD Responsible Provider: Lopez Adams MD    Anesthesia Type: general, TIVA ASA Status: 3        Anesthesia Type: general, TIVA    Natalee Phase I: Natalee Score: 9    Natalee Phase II:      Last vitals: Reviewed and per EMR flowsheets.      Anesthesia Post Evaluation   Anesthetic Problems: no   Cardiovascular System Stable: yes  Respiratory Function: Airway Patent yes  ETT no  Ventilator no  Level of consciousness: awake, alert and oriented  Post-op pain: adequate analgesia  Hydration Adequate: yes  Nausea/Vomiting:no  Other Issues:     Worth Schirmer, MD

## 2021-02-12 NOTE — PROGRESS NOTES
Delivered walker to [facility] room.   Thanks for the referral.  Josesito Felton  2/12/2021

## 2021-02-12 NOTE — PROGRESS NOTES
POD #1     Patient denies new lower extremity symptoms and saddle anesthesia. She notes she is taking po well. AF, VSS drain    Abdomen - soft, without distention  Dressing dry   5/5 strength of EHLs/FHLs, ankle DFs/PFs bilaterally   Sensation intact L3 to S1     H/H   2/11 21:30 9.8/29.3  2/12 06:16 8.5/25.1    Plan:   Physical therapy today. Probable D/C to home or tomorrow.

## 2021-02-12 NOTE — PROGRESS NOTES
(10/03/2016); Carpal tunnel release (Right, 10/20/2016); Foot surgery (Right); and cyst removal (Left, 10/17/2017). Restrictions  Restrictions/Precautions  Restrictions/Precautions: Fall Risk, Up as Tolerated, General Precautions  Position Activity Restriction  Spinal Precautions: No Bending, No Lifting, No Twisting  Other position/activity restrictions: IV, lauren catheter    Subjective   General  Chart Reviewed: Yes  Patient assessed for rehabilitation services?: Yes  Family / Caregiver Present: No  Referring Practitioner: Shweta Payne PA-C  Diagnosis: DEGENERATIVE SCOLIOSIS; s/p ANTERIOR LUMBAR INTERBODY FUSION L2-3, L3-4, L5-S1 WITH [employer] CAGE AND INFUSE 2-11-21  General Comment  Comments: RN cleared pt for OT eval; pt resting in bed, agreeable to therapy  Patient Currently in Pain: Yes  Pain Assessment  Pain Assessment: 0-10  Pain Level: 6  Pain Type: Acute pain;Surgical pain  Pain Location: Back  Pain Orientation: Posterior;Mid  Pain Descriptors: Dull  Pain Frequency: Continuous  Pain Onset: On-going  Clinical Progression: Not changed  Functional Pain Assessment: Prevents or interferes with many active not passive activities  Non-Pharmaceutical Pain Intervention(s): Ambulation/Increased Activity; Therapeutic presence;Repositioned    Pre Treatment Pain Screening  Intervention List: Patient able to continue with treatment;Nurse called to administer meds    Social/Functional History  Social/Functional History  Lives With: Daughter  Type of Home: House  Home Layout: One level  Home Access: Stairs to enter with rails(3)  Bathroom Shower/Tub: Tub/Shower unit(son to install grab bars)  Bathroom Toilet: Handicap height  ADL Assistance: Independent  Ambulation Assistance: Independent  Transfer Assistance: Independent       Objective        Orientation  Overall Orientation Status: Within Functional Limits     Balance  Sitting Balance: Supervision  Standing Balance: Minimal assistance(with

## 2021-02-13 VITALS
RESPIRATION RATE: 16 BRPM | OXYGEN SATURATION: 96 % | BODY MASS INDEX: 21.94 KG/M2 | TEMPERATURE: 98.4 F | HEART RATE: 107 BPM | HEIGHT: 62 IN | WEIGHT: 119.25 LBS | DIASTOLIC BLOOD PRESSURE: 62 MMHG | SYSTOLIC BLOOD PRESSURE: 113 MMHG

## 2021-02-13 LAB
HCT VFR BLD CALC: 26.9 % (ref 36–48)
HEMOGLOBIN: 9.1 G/DL (ref 12–16)

## 2021-02-13 PROCEDURE — 36415 COLL VENOUS BLD VENIPUNCTURE: CPT

## 2021-02-13 PROCEDURE — 85018 HEMOGLOBIN: CPT

## 2021-02-13 PROCEDURE — 97116 GAIT TRAINING THERAPY: CPT

## 2021-02-13 PROCEDURE — 6370000000 HC RX 637 (ALT 250 FOR IP): Performed by: PHYSICIAN ASSISTANT

## 2021-02-13 PROCEDURE — 2580000003 HC RX 258: Performed by: PHYSICIAN ASSISTANT

## 2021-02-13 PROCEDURE — 85014 HEMATOCRIT: CPT

## 2021-02-13 RX ORDER — DOCUSATE SODIUM 100 MG/1
100 CAPSULE, LIQUID FILLED ORAL DAILY PRN
Qty: 30 CAPSULE | Refills: 0 | Status: SHIPPED | OUTPATIENT
Start: 2021-02-13

## 2021-02-13 RX ADMIN — HYDROCODONE BITARTRATE AND ACETAMINOPHEN 2 TABLET: 5; 325 TABLET ORAL at 06:41

## 2021-02-13 RX ADMIN — HYDROCODONE BITARTRATE AND ACETAMINOPHEN 2 TABLET: 5; 325 TABLET ORAL at 02:44

## 2021-02-13 RX ADMIN — BUPROPION HYDROCHLORIDE 300 MG: 150 TABLET, EXTENDED RELEASE ORAL at 08:11

## 2021-02-13 RX ADMIN — AMLODIPINE BESYLATE 10 MG: 5 TABLET ORAL at 08:11

## 2021-02-13 RX ADMIN — OXYCODONE HYDROCHLORIDE AND ACETAMINOPHEN 1000 MG: 500 TABLET ORAL at 08:11

## 2021-02-13 RX ADMIN — MAGNESIUM GLUCONATE 500 MG ORAL TABLET 400 MG: 500 TABLET ORAL at 08:11

## 2021-02-13 RX ADMIN — DOCUSATE SODIUM 50MG AND SENNOSIDES 8.6MG 1 TABLET: 8.6; 5 TABLET, FILM COATED ORAL at 08:11

## 2021-02-13 RX ADMIN — Medication 2000 UNITS: at 08:11

## 2021-02-13 RX ADMIN — Medication 10 ML: at 08:12

## 2021-02-13 RX ADMIN — HYDROCODONE BITARTRATE AND ACETAMINOPHEN 2 TABLET: 5; 325 TABLET ORAL at 10:39

## 2021-02-13 ASSESSMENT — ENCOUNTER SYMPTOMS: SHORTNESS OF BREATH: 0

## 2021-02-13 ASSESSMENT — PAIN SCALES - GENERAL
PAINLEVEL_OUTOF10: 7
PAINLEVEL_OUTOF10: 6
PAINLEVEL_OUTOF10: 8
PAINLEVEL_OUTOF10: 0
PAINLEVEL_OUTOF10: 8
PAINLEVEL_OUTOF10: 8

## 2021-02-13 ASSESSMENT — PAIN DESCRIPTION - LOCATION
LOCATION: ABDOMEN;BACK
LOCATION: ABDOMEN;BACK

## 2021-02-13 NOTE — PROGRESS NOTES
Physical Therapy  Facility/Department: [facility] C5 - MED SURG/ORTHO  Daily Treatment Note  NAME: Mil Hernandez  : 1955  MRN: [id number]    Date of Service: 2021    Discharge Recommendations:  Home with assist PRN        Assessment   Body structures, Functions, Activity limitations: Decreased functional mobility ; Decreased endurance; Increased pain  Assessment: Pt pleasant and agreeable to PT session. Pt ambulates and performs transfers with RW and SBA this date. Edu on HEP to perform and importances of mobility after d/c home. Recommend home with PRN assit and RW. Treatment Diagnosis: decreased mobility post back surgery  Specific instructions for Next Treatment: ex, gait  Prognosis: Good  Decision Making: Low Complexity  PT Education: Goals;Transfer Training;Disease Specific Education;PT Role;Functional Mobility Training;Plan of Care;General Safety;Home Exercise Program;Gait Training;Precautions  Patient Education: Vendor delivered pt RW for home. PT edu on fitting Rw to pt height. Also edu on performing HEP and frequent mobility at home  Barriers to Learning: none  REQUIRES PT FOLLOW UP: Yes  Activity Tolerance  Activity Tolerance: Patient Tolerated treatment well     Patient Diagnosis(es): The encounter diagnosis was S/P lumbar spinal fusion. has a past medical history of Arthritis, Closed fracture of metatarsal bone(s), Depression, HBP (high blood pressure), History of blood transfusion, PONV (postoperative nausea and vomiting), S/P insertion of spinal cord stimulator, and Spinal cord stimulator status. has a past surgical history that includes Foot surgery (09); Hand surgery (Right, 2011); Hysterectomy; back surgery (); back surgery (10/03/2016); Carpal tunnel release (Right, 10/20/2016); Foot surgery (Right); cyst removal (Left, 10/17/2017); lumbar fusion (N/A, 2021); and lumbar fusion (N/A, 2021).     Restrictions  Restrictions/Precautions  Restrictions/Precautions: Fall Risk, Up as Tolerated, General Precautions  Position Activity Restriction  Spinal Precautions: No Bending, No Lifting, No Twisting  Other position/activity restrictions: IV     Subjective   General  Chart Reviewed: Yes  Response To Previous Treatment: Patient with no complaints from previous session. Family / Caregiver Present: No  Referring Practitioner: Pili LOZANO for Dr. Nancy Lauren  Subjective  Subjective: Pt seated in chair upon arrival; agreeable to PT  Pain Screening  Patient Currently in Pain: Yes  Pain Assessment  Pain Level: 6  Pain Location: Abdomen;Back  Vital Signs  Patient Currently in Pain: Yes          Objective      Transfers  Sit to Stand: Stand by assistance  Stand to sit: Stand by assistance  Bed to Chair: Stand by assistance  Comment: With use of RW; Toilet transfer with SBA, pt demo safe use of grab bars     Ambulation  Ambulation?: Yes  Ambulation 1  Device: Rolling Walker  Assistance: Stand by assistance  Quality of Gait: slow pace, shortened stride  Distance: 30'+20'  Comments: Pt amb to bathroom and around hospital room with use of RW. Nausea noted after ambulation.   Stairs/Curb  Stairs?: No(Pt declined trialing stairs prior to d/c)     Balance  Sitting - Static: Good  Sitting - Dynamic: Good  Standing - Static: Good;-  Standing - Dynamic: Fair  Comments: IND sitting balance; SBA standing balance with RW         AM-PAC Score  AM-PAC Inpatient Mobility Raw Score : 18 (02/13/21 1043)  AM-PAC Inpatient T-Scale Score : 43.63 (02/13/21 1043)  Mobility Inpatient CMS 0-100% Score: 46.58 (02/13/21 1043)  Mobility Inpatient CMS G-Code Modifier : CK (02/13/21 1043)          Goals  Short term goals  Time Frame for Short term goals: 2-3 days (2/14)  Short term goal 1: pt to perform bed mob with SBA-- DNT  Short term goal 2: pt to perform transfers SBA-- MET 2/13  Short term goal 3: pt to amb with  ft SBA-- Progressing  Short term goal 4: pt to voice understanding of HEP and post op spinal precautions- MET  Patient Goals   Patient goals : \"to know my precautions after spine surgery\"- MET    Plan    Plan  Times per week: 7 days wk  Specific instructions for Next Treatment: ex, gait  Current Treatment Recommendations: Strengthening, Gait Training, Safety Education & Training, Home Exercise Program, Pain Management, Functional Mobility Training, Endurance Training, Transfer Training  Safety Devices  Type of devices: All fall risk precautions in place, Gait belt, Nurse notified, Patient at risk for falls, Call light within reach, Left in chair     Therapy Time   Individual Concurrent Group Co-treatment   Time In 0912         Time Out 0926         Minutes 14         Timed Code Treatment Minutes: 14 Minutes     If pt is unable to be seen after this session, please let this note serve as discharge summary. Please see case management note for discharge disposition. Thank you.     Cherie Ferraro, PT

## 2021-02-13 NOTE — DISCHARGE SUMMARY
Physician Discharge Summary     Patient ID:  Celestina Quintanilla  [id number]  1 y.o.  1955    Admit date: 2/11/2021    Discharge date and time: 2/13/2021 due to pain control    Admitting Physician: Shantelle Roth MD     Discharge Physician: Ac Huerta PA-C    Admission Diagnoses: Degenerative scoliosis [M41.80]  S/P lumbar spinal fusion [Z98.1]    Discharge Diagnoses: S/P anterior posterior  Lumbar spinal fusion    Admission Condition: good    Discharged Condition: good    Indication for Admission:     Hospital Course: uneventful    Consults: none    Significant Diagnostic Studies:     Treatments:     Discharge Exam:  Neurologic: Alert and oriented X 3, normal strength and tone. Normal symmetric reflexes. Normal coordination and gait  No pain or weakness with ROM of lower extremities. Disposition: home    In process/preliminary results:  Outstanding Order Results     Date and Time Order Name Status Description    2/11/2021 1339 FLUORO FOR SURGICAL PROCEDURES In process     2/11/2021 0659 PREPARE RBC (CROSSMATCH), 2 Units Preliminary           Patient Instructions:   Current Discharge Medication List      START taking these medications    Details   docusate sodium (COLACE) 100 MG capsule Take 1 capsule by mouth daily as needed for Constipation  Qty: 30 capsule, Refills: 0      HYDROcodone-acetaminophen (NORCO) 5-325 MG per tablet Take 2 tablets by mouth every 4 hours as needed for Pain for up to 7 days. Intended supply: 7 days. Take lowest dose possible to manage pain  Qty: 42 tablet, Refills: 0    Comments: Reduce doses taken as pain becomes manageable  Associated Diagnoses: S/P lumbar spinal fusion      methocarbamol (ROBAXIN-750) 750 MG tablet Take 1 tablet by mouth 3 times daily  Qty: 90 tablet, Refills: 0      gabapentin (NEURONTIN) 300 MG capsule Take 1 capsule by mouth 3 times daily for 30 days.   Qty: 90 capsule, Refills: 0      ondansetron (ZOFRAN) 4 MG tablet Take 1 tablet by mouth 3 times daily as needed for Nausea or Vomiting  Qty: 15 tablet, Refills: 0         CONTINUE these medications which have NOT CHANGED    Details   amLODIPine (NORVASC) 10 MG tablet Take 10 mg by mouth daily      zinc 50 MG CAPS Take by mouth      Ascorbic Acid (VITAMIN C) 1000 MG tablet Take 1,000 mg by mouth daily      Cholecalciferol (VITAMIN D3) 50 MCG (2000 UT) CAPS Take by mouth      Magnesium 500 MG CAPS Take by mouth 2 times daily      buPROPion (WELLBUTRIN XL) 300 MG extended release tablet Take 300 mg by mouth every morning      lisinopril (PRINIVIL;ZESTRIL) 40 MG tablet Take 40 mg by mouth daily. STOP taking these medications       Turmeric 500 MG CAPS Comments:   Reason for Stopping:         TURMERIC PO Comments:   Reason for Stopping:         ibuprofen (ADVIL;MOTRIN) 200 MG tablet Comments:   Reason for Stopping:             Activity: activity as tolerated and no heavy lifting, pushing, or pulling for 3 months post op. Diet: regular diet  Wound Care: keep wound clean and dry, reinforce dressing PRN and ice to area for comfort    Follow-up with Dr. Saundra Lopez in 2 weeks.     Signed:  Susan Rojo PA-C  2/13/2021  10:51 AM

## 2021-02-13 NOTE — PROGRESS NOTES
Progress Note  Date:2021       Room:[facility]/0550-01  Patient Name:Ayesha Fragoso     YOB: 1955     Age:65 y.o. Patient resting in bed. Tolerated PT/OT. Using rolling walker. Subjective    Subjective:  Symptoms:  Improved. No shortness of breath or chest pain. Diet:  Adequate intake. Activity level: Impaired due to pain. Pain:  She complains of pain that is moderate. She reports pain is improving. Subjective patient pain control: adequate pain controll. Review of Systems   Respiratory: Negative for shortness of breath. Cardiovascular: Negative for chest pain. Objective         Vitals Last 24 Hours: Wound: C/D/I with no surrounding redness, or edema. TEMPERATURE:  Temp  Av.5 °F (36.9 °C)  Min: 97.7 °F (36.5 °C)  Max: 99 °F (37.2 °C)  RESPIRATIONS RANGE: Resp  Av  Min: 16  Max: 16  PULSE OXIMETRY RANGE: SpO2  Av.4 %  Min: 94 %  Max: 96 %  PULSE RANGE: Pulse  Av.8  Min: 84  Max: 102  BLOOD PRESSURE RANGE: Systolic (50FIW), DFV:625 , Min:100 , AL   ; Diastolic (21WKW), COH:88, Min:58, Max:67    I/O (24Hr): Intake/Output Summary (Last 24 hours) at 2021 1039  Last data filed at 2021 2088  Gross per 24 hour   Intake 240 ml   Output 1700 ml   Net -1460 ml     Objective  Labs/Imaging/Diagnostics    Labs:  CBC:  Recent Labs     21  2130 21  0616 21  0719   WBC  --  8.3  --    RBC  --  2.76*  --    HGB 9.8* 8.5* 9.1*   HCT 29.3* 25.1* 26.9*   MCV  --  90.9  --    RDW  --  14.0  --    PLT  --  290  --      CHEMISTRIES:No results for input(s): NA, K, CL, CO2, BUN, CREATININE, GLUCOSE, PHOS, MG in the last 72 hours. Invalid input(s): CA  PT/INR:No results for input(s): PROTIME, INR in the last 72 hours. APTT:No results for input(s): APTT in the last 72 hours. LIVER PROFILE:No results for input(s): AST, ALT, BILIDIR, BILITOT, ALKPHOS in the last 72 hours.     Imaging Last 24 Hours:  Xr Lumbar Spine (2-3 Views)    Result Date: 2/11/2021  EXAMINATION: FLUOROSCOPIC AND O ARM IMAGES OF THE LUMBAR SPINE 2/11/2021 10:34 am COMPARISON: None. HISTORY: ORDERING SYSTEM PROVIDED HISTORY: low back pain TECHNOLOGIST PROVIDED HISTORY: Reason for exam:->low back pain Reason for Exam: lumbar fusion Acuity: Chronic Type of Exam: Initial FLUOROSCOPY TIME: Fluoroscopy time 10.15 seconds. 5 image series FINDINGS: Fluoroscopic images demonstrate multiple pedicle screws, bilateral at L2, L3 and S1. Right L4 and left L5 pedicle screws are present as well. Anterior fusion hardware is noted at multiple levels. There are laminectomies at L4-5 and L5-S1. No malposition or of hardware is identified. Intraoperative soft tissue gas is noted. No paraspinous mass or hematoma is appreciated. Intraoperative series with anterior and posterior stabilization hardware in the lumbar spine. No complications are evident. Assessment//Plan           Hospital Problems           Last Modified POA    S/P lumbar spinal fusion 2/11/2021 Yes    Degenerative disc disease, lumbar 2/11/2021 Yes        Assessment & Plan   Stable anterior/posterior spine fusion. DC home today  Instructions given  Prescriptions for Norco, Zofran, Colace, gabapentin, and robaxin given. Follow up: as scheduled.     Electronically signed by Nathan Francois PA-C on 2/13/21 at 10:39 AM EST

## 2021-02-13 NOTE — CARE COORDINATION
CASE MANAGEMENT DISCHARGE SUMMARY      Discharge to: home      New Durable Medical Equipment ordered/agency: RW delivered through [employer]:    Family/car: private

## 2021-02-15 ENCOUNTER — CARE COORDINATION (OUTPATIENT)
Dept: CASE MANAGEMENT | Age: 66
End: 2021-02-15

## 2021-02-15 LAB
BLOOD BANK DISPENSE STATUS: NORMAL
BLOOD BANK DISPENSE STATUS: NORMAL
BLOOD BANK PRODUCT CODE: NORMAL
BLOOD BANK PRODUCT CODE: NORMAL
BPU ID: NORMAL
BPU ID: NORMAL
DESCRIPTION BLOOD BANK: NORMAL
DESCRIPTION BLOOD BANK: NORMAL

## 2021-02-15 NOTE — CARE COORDINATION
[facility] Transitions Initial Follow Up Call    Call within 2 business days of discharge: Yes    Patient: Cynthia Johnson Patient : 1955   MRN: [id number]  Reason for Admission: s/p lumbar fusion  Discharge Date: 21 RARS: Readmission Risk Score: 9      Last Discharge [facility]       Complaint Diagnosis Description Type Department Provider    21  S/P lumbar spinal fusion Admission (Discharged) Rupinder Lockhart MD           Attempted to reach patient via phone for initial post hospital transition call. VM left stating purpose of call along with my contact information requesting a return call.           Care Transitions 24 Hour Call    Care Transitions Interventions         Follow Up  Future Appointments   Date Time Provider Hieu Champion   2021  1:00 PM MD TRUDY Pina RN

## 2021-02-16 ENCOUNTER — CARE COORDINATION (OUTPATIENT)
Dept: CASE MANAGEMENT | Age: 66
End: 2021-02-16

## 2021-02-16 NOTE — CARE COORDINATION
[facility] Transitions Initial Follow Up Call    Call within 2 business days of discharge: Yes    Patient: Ra Ear Patient : 1955   MRN: [id number]  Reason for Admission: s/p lumbar surgery  Discharge Date: 21 RARS: Readmission Risk Score: 9      Last Discharge [facility]       Complaint Diagnosis Description Type Department Provider    21  S/P lumbar spinal fusion Admission (Discharged) Jazmin Mullins MD           Second attempt made to reach patient for initial post hospital transition call. VM left stating purpose of call along with my contact information requesting a return call.   Episode closed due to unsuccessful contact              Care Transitions 24 Hour Call    Care Transitions Interventions         Follow Up  Future Appointments   Date Time Provider Hieu Champion   2021  1:00 PM Zelalem Soliman MD [facility] ASHKAN Evans RN

## 2021-02-17 ENCOUNTER — TELEPHONE (OUTPATIENT)
Dept: ORTHOPEDIC SURGERY | Age: 66
End: 2021-02-17

## 2021-02-17 DIAGNOSIS — Z98.1 S/P LUMBAR SPINAL FUSION: ICD-10-CM

## 2021-02-18 ENCOUNTER — TELEPHONE (OUTPATIENT)
Dept: ORTHOPEDIC SURGERY | Age: 66
End: 2021-02-18

## 2021-02-18 RX ORDER — HYDROCODONE BITARTRATE AND ACETAMINOPHEN 5; 325 MG/1; MG/1
1 TABLET ORAL EVERY 6 HOURS PRN
Qty: 28 TABLET | Refills: 0 | Status: SHIPPED | OUTPATIENT
Start: 2021-02-18 | End: 2021-02-25

## 2021-02-23 ENCOUNTER — OFFICE VISIT (OUTPATIENT)
Dept: ORTHOPEDIC SURGERY | Age: 66
End: 2021-02-23

## 2021-02-23 VITALS — WEIGHT: 119 LBS | HEIGHT: 62 IN | BODY MASS INDEX: 21.9 KG/M2

## 2021-02-23 DIAGNOSIS — Z98.1 S/P LUMBAR SPINAL FUSION: Primary | ICD-10-CM

## 2021-02-23 PROCEDURE — 99024 POSTOP FOLLOW-UP VISIT: CPT | Performed by: PHYSICIAN ASSISTANT

## 2021-02-23 RX ORDER — HYDROCODONE BITARTRATE AND ACETAMINOPHEN 5; 325 MG/1; MG/1
2 TABLET ORAL 4 TIMES DAILY
Qty: 42 TABLET | Refills: 0 | Status: SHIPPED | OUTPATIENT
Start: 2021-02-23 | End: 2021-03-02

## 2021-02-23 NOTE — PROGRESS NOTES
Chief Complaint   Patient presents with    Post-Op Check     ant/post 2/11/21       Anterior lumbar interbody fusion L2-3, L3-4, L5-S1 with [employer] cage and infuse 2/11/2021  Posterior spinal fusion L2-S1 2/11/2021. Patient is progressing satisfactorily with adequate pain control at the present time. Patient denies any bowel or bladder dysfunction. Lower extremity pain and weakness gradually improving. Exam:  General Exam:  Pt is alert and oriented x 3 and in no acute distress. Gait is heel toe with no limp or instability pain freely with a wheeled walker. Mood and affect are appropriate. Back:  No deformity. Healing surgical scars anteriorly and posteriorly. No rash. positive for back pain on flexion. Mild right leg weakness with weightbearing  reduced ROM overall. Negative pain on palpation. Lower Extremities:   She has 4/5 strength of EHLs, FHLs, foot evertors, ankle dorsiflexors, plantarflexors, quadriceps, hamstrings, iliopsoas, abductors and adductors about the hips on the right and 4+ on the left. She has a negative straight leg raise, bilaterally. Achilles and quadriceps reflexes are 1+. Sensation is intact to light touch L3 to S1 bilaterally. She has no clonus. Hip range of motion painless. X-rays: 2 views of the lumbar spine to include AP and lateral which were obtained in the office today reveal spinal fusion with instrumentation from L2-S1 with the hardware to be intact. Impression:   Diagnosis Orders   1. S/P lumbar spinal fusion  XR LUMBAR SPINE (2-3 VIEWS)       A/P: Patient is to continue with her present level activity and a refill of her pain medicine was sent to her pharmacy today. She is to avoid any heavy lifting, twisting, or repetitive activity. She should not be lifting greater than 10 pounds. Follow-up: 1 month at which time repeat clinical examination and x-rays will be obtained of the lumbar spine.     Patient examined and note dictated by Adrianne Flanagan Pili May PA-C.      Cc: BESS Sanchez MD

## 2021-03-04 ENCOUNTER — TELEPHONE (OUTPATIENT)
Dept: ORTHOPEDIC SURGERY | Age: 66
End: 2021-03-04

## 2021-03-05 DIAGNOSIS — Z98.1 S/P LUMBAR SPINAL FUSION: Primary | ICD-10-CM

## 2021-03-05 RX ORDER — ONDANSETRON 4 MG/1
4 TABLET, FILM COATED ORAL 3 TIMES DAILY PRN
Qty: 15 TABLET | Refills: 0 | Status: SHIPPED | OUTPATIENT
Start: 2021-03-05

## 2021-03-05 RX ORDER — OXYCODONE HYDROCHLORIDE AND ACETAMINOPHEN 5; 325 MG/1; MG/1
1 TABLET ORAL EVERY 6 HOURS PRN
Qty: 28 TABLET | Refills: 0 | Status: SHIPPED | OUTPATIENT
Start: 2021-03-05 | End: 2021-03-12

## 2021-03-05 NOTE — TELEPHONE ENCOUNTER
Have patient return to the office next week for repeat clinical examination. Have the patient take 2 gabapentin 300 mg at night, 1 in the morning, and 1 in the afternoon.

## 2021-03-05 NOTE — TELEPHONE ENCOUNTER
Spoke with patient and let her know Ralph's recommendation. Patient states that her pain got better when she took some old percocet. She states that she can tolerate this medication with Zofran. Since she is a surgical patient Pretty Partida is going to call in [address] [place] [address] Way and Zofran. If this doesn't help, she will call back to come in sooner.

## 2021-03-16 ENCOUNTER — OFFICE VISIT (OUTPATIENT)
Dept: ORTHOPEDIC SURGERY | Age: 66
End: 2021-03-16

## 2021-03-16 VITALS — BODY MASS INDEX: 21.9 KG/M2 | WEIGHT: 119 LBS | HEIGHT: 62 IN

## 2021-03-16 DIAGNOSIS — Z98.1 S/P LUMBAR FUSION: Primary | ICD-10-CM

## 2021-03-16 PROCEDURE — 99024 POSTOP FOLLOW-UP VISIT: CPT | Performed by: ORTHOPAEDIC SURGERY

## 2021-03-16 NOTE — PROGRESS NOTES
Chief complaint   Low back pain    Eunasmis Karl is 1 month s/p anterior-posterior fusion L2-3, L3-4, L5-S1 degenerative scoliosis, adjacent level disc disease and right greater than left leg pain. She notes slowly improving right anterior thigh pain and weakness. Exam:  General Exam:  Pt is alert and oriented x 3 and in no acute distress. Gait is heel toe with no limp or instability pain freely with a wheeled walker. Mood and affect are appropriate. Back:  No deformity. Healing surgical scars anteriorly and posteriorly. No rash. positive for back pain on flexion. Mild right leg weakness with weightbearing  reduced ROM overall. Negative pain on palpation. Lower Extremities:   She has 4/5 strength of EHLs, FHLs, foot evertors, ankle dorsiflexors, plantarflexors, quadriceps, hamstrings, iliopsoas, abductors and adductors about the hips on the right and 4+ on the left. She has a negative straight leg raise, bilaterally. Achilles and quadriceps reflexes are 1+. Sensation is intact to light touch L3 to S1 bilaterally. She has no clonus. Hip range of motion painless. X-rays: 2 views of the lumbar spine to include AP and lateral which were obtained in the office today reveal spinal fusion with instrumentation from L2-S1 with the hardware to be intact. Impression:  Degenerative scoliosis    A/P. She is to avoid any heavy lifting, twisting, or repetitive activity. She should not be lifting greater than 10 pounds.   She will return in 2 months time for repeat x-rays      Cc: BESS Smith MD Bactrim Counseling:  I discussed with the patient the risks of sulfa antibiotics including but not limited to GI upset, allergic reaction, drug rash, diarrhea, dizziness, photosensitivity, and yeast infections.  Rarely, more serious reactions can occur including but not limited to aplastic anemia, agranulocytosis, methemoglobinemia, blood dyscrasias, liver or kidney failure, lung infiltrates or desquamative/blistering drug rashes. Sarecycline Counseling: Patient advised regarding possible photosensitivity and discoloration of the teeth, skin, lips, tongue and gums.  Patient instructed to avoid sunlight, if possible.  When exposed to sunlight, patients should wear protective clothing, sunglasses, and sunscreen.  The patient was instructed to call the office immediately if the following severe adverse effects occur:  hearing changes, easy bruising/bleeding, severe headache, or vision changes.  The patient verbalized understanding of the proper use and possible adverse effects of sarecycline.  All of the patient's questions and concerns were addressed. Erythromycin Counseling:  I discussed with the patient the risks of erythromycin including but not limited to GI upset, allergic reaction, drug rash, diarrhea, increase in liver enzymes, and yeast infections. High Dose Vitamin A Counseling: Side effects reviewed, pt to contact office should one occur. Topical Clindamycin Counseling: Patient counseled that this medication may cause skin irritation or allergic reactions.  In the event of skin irritation, the patient was advised to reduce the amount of the drug applied or use it less frequently.   The patient verbalized understanding of the proper use and possible adverse effects of clindamycin.  All of the patient's questions and concerns were addressed. Topical Retinoid counseling:  Patient advised to apply a pea-sized amount only at bedtime and wait 30 minutes after washing their face before applying.  If too drying, patient may add a non-comedogenic moisturizer. The patient verbalized understanding of the proper use and possible adverse effects of retinoids.  All of the patient's questions and concerns were addressed. Tetracycline Counseling: Patient counseled regarding possible photosensitivity and increased risk for sunburn.  Patient instructed to avoid sunlight, if possible.  When exposed to sunlight, patients should wear protective clothing, sunglasses, and sunscreen.  The patient was instructed to call the office immediately if the following severe adverse effects occur:  hearing changes, easy bruising/bleeding, severe headache, or vision changes.  The patient verbalized understanding of the proper use and possible adverse effects of tetracycline.  All of the patient's questions and concerns were addressed. Patient understands to avoid pregnancy while on therapy due to potential birth defects. Dapsone Counseling: I discussed with the patient the risks of dapsone including but not limited to hemolytic anemia, agranulocytosis, rashes, methemoglobinemia, kidney failure, peripheral neuropathy, headaches, GI upset, and liver toxicity.  Patients who start dapsone require monitoring including baseline LFTs and weekly CBCs for the first month, then every month thereafter.  The patient verbalized understanding of the proper use and possible adverse effects of dapsone.  All of the patient's questions and concerns were addressed. Dapsone Pregnancy And Lactation Text: This medication is Pregnancy Category C and is not considered safe during pregnancy or breast feeding. Erythromycin Pregnancy And Lactation Text: This medication is Pregnancy Category B and is considered safe during pregnancy. It is also excreted in breast milk. Topical Retinoid Pregnancy And Lactation Text: This medication is Pregnancy Category C. It is unknown if this medication is excreted in breast milk. Sarecycline Pregnancy And Lactation Text: This medication is Pregnancy Category D and not consider safe during pregnancy. It is also excreted in breast milk. Use Enhanced Medication Counseling?: No High Dose Vitamin A Pregnancy And Lactation Text: High dose vitamin A therapy is contraindicated during pregnancy and breast feeding. Bactrim Pregnancy And Lactation Text: This medication is Pregnancy Category D and is known to cause fetal risk.  It is also excreted in breast milk. Topical Clindamycin Pregnancy And Lactation Text: This medication is Pregnancy Category B and is considered safe during pregnancy. It is unknown if it is excreted in breast milk. Minocycline Counseling: Patient advised regarding possible photosensitivity and discoloration of the teeth, skin, lips, tongue and gums.  Patient instructed to avoid sunlight, if possible.  When exposed to sunlight, patients should wear protective clothing, sunglasses, and sunscreen.  The patient was instructed to call the office immediately if the following severe adverse effects occur:  hearing changes, easy bruising/bleeding, severe headache, or vision changes.  The patient verbalized understanding of the proper use and possible adverse effects of minocycline.  All of the patient's questions and concerns were addressed. Topical Sulfur Applications Counseling: Topical Sulfur Counseling: Patient counseled that this medication may cause skin irritation or allergic reactions.  In the event of skin irritation, the patient was advised to reduce the amount of the drug applied or use it less frequently.   The patient verbalized understanding of the proper use and possible adverse effects of topical sulfur application.  All of the patient's questions and concerns were addressed. Azithromycin Counseling:  I discussed with the patient the risks of azithromycin including but not limited to GI upset, allergic reaction, drug rash, diarrhea, and yeast infections. Spironolactone Counseling: Patient advised regarding risks of diarrhea, abdominal pain, hyperkalemia, birth defects (for female patients), liver toxicity and renal toxicity. The patient may need blood work to monitor liver and kidney function and potassium levels while on therapy. The patient verbalized understanding of the proper use and possible adverse effects of spironolactone.  All of the patient's questions and concerns were addressed. Tazorac Counseling:  Patient advised that medication is irritating and drying.  Patient may need to apply sparingly and wash off after an hour before eventually leaving it on overnight.  The patient verbalized understanding of the proper use and possible adverse effects of tazorac.  All of the patient's questions and concerns were addressed. Isotretinoin Counseling: Patient should get monthly blood tests, not donate blood, not drive at night if vision affected, not share medication, and not undergo elective surgery for 6 months after tx completed. Side effects reviewed, pt to contact office should one occur. Detail Level: Zone Birth Control Pills Counseling: Birth Control Pill Counseling: I discussed with the patient the potential side effects of OCPs including but not limited to increased risk of stroke, heart attack, thrombophlebitis, deep venous thrombosis, hepatic adenomas, breast changes, GI upset, headaches, and depression.  The patient verbalized understanding of the proper use and possible adverse effects of OCPs. All of the patient's questions and concerns were addressed. Doxycycline Counseling:  Patient counseled regarding possible photosensitivity and increased risk for sunburn.  Patient instructed to avoid sunlight, if possible.  When exposed to sunlight, patients should wear protective clothing, sunglasses, and sunscreen.  The patient was instructed to call the office immediately if the following severe adverse effects occur:  hearing changes, easy bruising/bleeding, severe headache, or vision changes.  The patient verbalized understanding of the proper use and possible adverse effects of doxycycline.  All of the patient's questions and concerns were addressed. Benzoyl Peroxide Counseling: Patient counseled that medicine may cause skin irritation and bleach clothing.  In the event of skin irritation, the patient was advised to reduce the amount of the drug applied or use it less frequently.   The patient verbalized understanding of the proper use and possible adverse effects of benzoyl peroxide.  All of the patient's questions and concerns were addressed. Benzoyl Peroxide Pregnancy And Lactation Text: This medication is Pregnancy Category C. It is unknown if benzoyl peroxide is excreted in breast milk. Doxycycline Pregnancy And Lactation Text: This medication is Pregnancy Category D and not consider safe during pregnancy. It is also excreted in breast milk but is considered safe for shorter treatment courses. Birth Control Pills Pregnancy And Lactation Text: This medication should be avoided if pregnant and for the first 30 days post-partum. Spironolactone Pregnancy And Lactation Text: This medication can cause feminization of the male fetus and should be avoided during pregnancy. The active metabolite is also found in breast milk. Azithromycin Pregnancy And Lactation Text: This medication is considered safe during pregnancy and is also secreted in breast milk. Isotretinoin Pregnancy And Lactation Text: This medication is Pregnancy Category X and is considered extremely dangerous during pregnancy. It is unknown if it is excreted in breast milk. Tazorac Pregnancy And Lactation Text: This medication is not safe during pregnancy. It is unknown if this medication is excreted in breast milk. Topical Sulfur Applications Pregnancy And Lactation Text: This medication is Pregnancy Category C and has an unknown safety profile during pregnancy. It is unknown if this topical medication is excreted in breast milk.

## 2021-04-12 ENCOUNTER — HOSPITAL ENCOUNTER (OUTPATIENT)
Age: 66
Setting detail: OBSERVATION
Discharge: HOME | End: 2021-04-14
Attending: EMERGENCY MEDICINE | Admitting: INTERNAL MEDICINE
Payer: MEDICARE

## 2021-04-12 ENCOUNTER — APPOINTMENT (OUTPATIENT)
Dept: CT IMAGING | Age: 66
End: 2021-04-12
Payer: MEDICARE

## 2021-04-12 ENCOUNTER — APPOINTMENT (OUTPATIENT)
Dept: GENERAL RADIOLOGY | Age: 66
End: 2021-04-12
Payer: MEDICARE

## 2021-04-12 DIAGNOSIS — R00.0 TACHYCARDIA: ICD-10-CM

## 2021-04-12 DIAGNOSIS — I31.39 PERICARDIAL EFFUSION: ICD-10-CM

## 2021-04-12 DIAGNOSIS — R06.09 DOE (DYSPNEA ON EXERTION): Primary | ICD-10-CM

## 2021-04-12 LAB
ANION GAP SERPL CALCULATED.3IONS-SCNC: 11 MMOL/L (ref 3–16)
BASOPHILS ABSOLUTE: 0.1 K/UL (ref 0–0.2)
BASOPHILS RELATIVE PERCENT: 1.1 %
BUN BLDV-MCNC: 12 MG/DL (ref 7–20)
CALCIUM SERPL-MCNC: 9.8 MG/DL (ref 8.3–10.6)
CHLORIDE BLD-SCNC: 97 MMOL/L (ref 99–110)
CO2: 26 MMOL/L (ref 21–32)
CREAT SERPL-MCNC: 0.9 MG/DL (ref 0.6–1.2)
EOSINOPHILS ABSOLUTE: 0 K/UL (ref 0–0.6)
EOSINOPHILS RELATIVE PERCENT: 0.6 %
GFR AFRICAN AMERICAN: >60
GFR NON-AFRICAN AMERICAN: >60
GLUCOSE BLD-MCNC: 117 MG/DL (ref 70–99)
HCT VFR BLD CALC: 34.6 % (ref 36–48)
HEMOGLOBIN: 11.6 G/DL (ref 12–16)
LYMPHOCYTES ABSOLUTE: 1.2 K/UL (ref 1–5.1)
LYMPHOCYTES RELATIVE PERCENT: 18.9 %
MCH RBC QN AUTO: 30.2 PG (ref 26–34)
MCHC RBC AUTO-ENTMCNC: 33.4 G/DL (ref 31–36)
MCV RBC AUTO: 90.6 FL (ref 80–100)
MONOCYTES ABSOLUTE: 0.6 K/UL (ref 0–1.3)
MONOCYTES RELATIVE PERCENT: 10 %
NEUTROPHILS ABSOLUTE: 4.2 K/UL (ref 1.7–7.7)
NEUTROPHILS RELATIVE PERCENT: 69.4 %
PDW BLD-RTO: 14.5 % (ref 12.4–15.4)
PLATELET # BLD: 364 K/UL (ref 135–450)
PMV BLD AUTO: 7.4 FL (ref 5–10.5)
POTASSIUM REFLEX MAGNESIUM: 3.8 MMOL/L (ref 3.5–5.1)
PRO-BNP: 155 PG/ML (ref 0–124)
RBC # BLD: 3.82 M/UL (ref 4–5.2)
SODIUM BLD-SCNC: 134 MMOL/L (ref 136–145)
TROPONIN: <0.01 NG/ML
WBC # BLD: 6.1 K/UL (ref 4–11)

## 2021-04-12 PROCEDURE — 80048 BASIC METABOLIC PNL TOTAL CA: CPT

## 2021-04-12 PROCEDURE — 86038 ANTINUCLEAR ANTIBODIES: CPT

## 2021-04-12 PROCEDURE — 83880 ASSAY OF NATRIURETIC PEPTIDE: CPT

## 2021-04-12 PROCEDURE — 71260 CT THORAX DX C+: CPT

## 2021-04-12 PROCEDURE — 84484 ASSAY OF TROPONIN QUANT: CPT

## 2021-04-12 PROCEDURE — 71046 X-RAY EXAM CHEST 2 VIEWS: CPT

## 2021-04-12 PROCEDURE — 93005 ELECTROCARDIOGRAM TRACING: CPT | Performed by: EMERGENCY MEDICINE

## 2021-04-12 PROCEDURE — 85025 COMPLETE CBC W/AUTO DIFF WBC: CPT

## 2021-04-12 PROCEDURE — 99282 EMERGENCY DEPT VISIT SF MDM: CPT

## 2021-04-12 PROCEDURE — 6360000004 HC RX CONTRAST MEDICATION: Performed by: PHYSICIAN ASSISTANT

## 2021-04-12 RX ADMIN — IOPAMIDOL 75 ML: 755 INJECTION, SOLUTION INTRAVENOUS at 21:34

## 2021-04-13 PROBLEM — R06.02 SOB (SHORTNESS OF BREATH): Status: ACTIVE | Noted: 2021-04-13

## 2021-04-13 LAB
ANTI-NUCLEAR ANTIBODY (ANA): NEGATIVE
C-REACTIVE PROTEIN: <3 MG/L (ref 0–5.1)
EKG ATRIAL RATE: 101 BPM
EKG DIAGNOSIS: NORMAL
EKG P AXIS: 74 DEGREES
EKG P-R INTERVAL: 144 MS
EKG Q-T INTERVAL: 350 MS
EKG QRS DURATION: 82 MS
EKG QTC CALCULATION (BAZETT): 453 MS
EKG R AXIS: 70 DEGREES
EKG T AXIS: 47 DEGREES
EKG VENTRICULAR RATE: 101 BPM
LV EF: 58 %
LVEF MODALITY: NORMAL
SEDIMENTATION RATE, ERYTHROCYTE: 12 MM/HR (ref 0–30)
TROPONIN: <0.01 NG/ML

## 2021-04-13 PROCEDURE — 96372 THER/PROPH/DIAG INJ SC/IM: CPT

## 2021-04-13 PROCEDURE — 86140 C-REACTIVE PROTEIN: CPT

## 2021-04-13 PROCEDURE — 94760 N-INVAS EAR/PLS OXIMETRY 1: CPT

## 2021-04-13 PROCEDURE — 85652 RBC SED RATE AUTOMATED: CPT

## 2021-04-13 PROCEDURE — 6360000002 HC RX W HCPCS: Performed by: INTERNAL MEDICINE

## 2021-04-13 PROCEDURE — 2580000003 HC RX 258: Performed by: INTERNAL MEDICINE

## 2021-04-13 PROCEDURE — 36415 COLL VENOUS BLD VENIPUNCTURE: CPT

## 2021-04-13 PROCEDURE — 6370000000 HC RX 637 (ALT 250 FOR IP): Performed by: INTERNAL MEDICINE

## 2021-04-13 PROCEDURE — 84484 ASSAY OF TROPONIN QUANT: CPT

## 2021-04-13 PROCEDURE — 93306 TTE W/DOPPLER COMPLETE: CPT

## 2021-04-13 PROCEDURE — G0378 HOSPITAL OBSERVATION PER HR: HCPCS

## 2021-04-13 PROCEDURE — 93010 ELECTROCARDIOGRAM REPORT: CPT | Performed by: INTERNAL MEDICINE

## 2021-04-13 PROCEDURE — 86255 FLUORESCENT ANTIBODY SCREEN: CPT

## 2021-04-13 RX ORDER — ASPIRIN 81 MG/1
81 TABLET, CHEWABLE ORAL DAILY
Status: DISCONTINUED | OUTPATIENT
Start: 2021-04-13 | End: 2021-04-14 | Stop reason: HOSPADM

## 2021-04-13 RX ORDER — MAGNESIUM SULFATE IN WATER 40 MG/ML
2000 INJECTION, SOLUTION INTRAVENOUS PRN
Status: DISCONTINUED | OUTPATIENT
Start: 2021-04-13 | End: 2021-04-14 | Stop reason: HOSPADM

## 2021-04-13 RX ORDER — AMLODIPINE BESYLATE 10 MG/1
10 TABLET ORAL DAILY
Status: DISCONTINUED | OUTPATIENT
Start: 2021-04-13 | End: 2021-04-14 | Stop reason: HOSPADM

## 2021-04-13 RX ORDER — PROMETHAZINE HYDROCHLORIDE 25 MG/1
12.5 TABLET ORAL EVERY 6 HOURS PRN
Status: DISCONTINUED | OUTPATIENT
Start: 2021-04-13 | End: 2021-04-14 | Stop reason: HOSPADM

## 2021-04-13 RX ORDER — SODIUM CHLORIDE 0.9 % (FLUSH) 0.9 %
5-40 SYRINGE (ML) INJECTION EVERY 12 HOURS SCHEDULED
Status: DISCONTINUED | OUTPATIENT
Start: 2021-04-13 | End: 2021-04-14 | Stop reason: HOSPADM

## 2021-04-13 RX ORDER — SODIUM CHLORIDE 9 MG/ML
25 INJECTION, SOLUTION INTRAVENOUS PRN
Status: DISCONTINUED | OUTPATIENT
Start: 2021-04-13 | End: 2021-04-14 | Stop reason: HOSPADM

## 2021-04-13 RX ORDER — BUPROPION HYDROCHLORIDE 150 MG/1
300 TABLET ORAL EVERY MORNING
Status: DISCONTINUED | OUTPATIENT
Start: 2021-04-13 | End: 2021-04-14 | Stop reason: HOSPADM

## 2021-04-13 RX ORDER — POTASSIUM CHLORIDE 7.45 MG/ML
10 INJECTION INTRAVENOUS PRN
Status: DISCONTINUED | OUTPATIENT
Start: 2021-04-13 | End: 2021-04-14 | Stop reason: HOSPADM

## 2021-04-13 RX ORDER — ONDANSETRON 2 MG/ML
4 INJECTION INTRAMUSCULAR; INTRAVENOUS EVERY 6 HOURS PRN
Status: DISCONTINUED | OUTPATIENT
Start: 2021-04-13 | End: 2021-04-14 | Stop reason: HOSPADM

## 2021-04-13 RX ORDER — GABAPENTIN 300 MG/1
300 CAPSULE ORAL 3 TIMES DAILY
Status: DISCONTINUED | OUTPATIENT
Start: 2021-04-13 | End: 2021-04-13

## 2021-04-13 RX ORDER — LISINOPRIL 20 MG/1
40 TABLET ORAL DAILY
Status: DISCONTINUED | OUTPATIENT
Start: 2021-04-13 | End: 2021-04-14 | Stop reason: HOSPADM

## 2021-04-13 RX ORDER — SODIUM CHLORIDE 0.9 % (FLUSH) 0.9 %
5-40 SYRINGE (ML) INJECTION PRN
Status: DISCONTINUED | OUTPATIENT
Start: 2021-04-13 | End: 2021-04-14 | Stop reason: HOSPADM

## 2021-04-13 RX ADMIN — AMLODIPINE BESYLATE 10 MG: 10 TABLET ORAL at 08:20

## 2021-04-13 RX ADMIN — LISINOPRIL 40 MG: 20 TABLET ORAL at 08:20

## 2021-04-13 RX ADMIN — ASPIRIN 81 MG: 81 TABLET, CHEWABLE ORAL at 08:20

## 2021-04-13 RX ADMIN — BUPROPION HYDROCHLORIDE 300 MG: 150 TABLET, EXTENDED RELEASE ORAL at 08:20

## 2021-04-13 RX ADMIN — ENOXAPARIN SODIUM 40 MG: 40 INJECTION SUBCUTANEOUS at 08:19

## 2021-04-13 RX ADMIN — SODIUM CHLORIDE, PRESERVATIVE FREE 10 ML: 5 INJECTION INTRAVENOUS at 08:20

## 2021-04-13 RX ADMIN — SODIUM CHLORIDE, PRESERVATIVE FREE 10 ML: 5 INJECTION INTRAVENOUS at 22:34

## 2021-04-13 ASSESSMENT — PAIN SCALES - GENERAL
PAINLEVEL_OUTOF10: 0
PAINLEVEL_OUTOF10: 0

## 2021-04-13 NOTE — ED PROVIDER NOTES
I independently evaluated and obtained a history and physical on [facility]. All diagnostic, treatment, and disposition assistants were made to myself in conjunction the advanced practice provider. For further details of this patient's emergency department encounter, please see the advanced practice provider's documentation. History: Patient is a 54-year-old female with 3 days history of some shortness of breath and feels like palpitation. Patient complains of weakness. There has been no cough no nausea or chest pain. Patient is seen in [facility]. Patient states she just does not feel good. Has no prior history of this before. Patient denies any injury. Did receive her vaccine for Covid recently. Patient was seen in conjunction with [facility]. Physician Exam: Patient is an elderly 54-year-old no distress while sitting. Feels well shaky and jittery. Her exam is unremarkable her lungs were clear to auscultation heart regular rate and rhythm however when we walked patient patient became more tachycardic and hypoxic. Her labs and scans were as follows.     Labs Reviewed   CBC WITH AUTO DIFFERENTIAL - Abnormal; Notable for the following components:       Result Value    RBC 3.82 (*)     Hemoglobin 11.6 (*)     Hematocrit 34.6 (*)     All other components within normal limits    Narrative:     Performed at:  [facility]  [address] [facility] [address]   Phone [phone]   BASIC METABOLIC PANEL W/ REFLEX TO MG FOR LOW K - Abnormal; Notable for the following components:    Sodium 134 (*)     Chloride 97 (*)     Glucose 117 (*)     All other components within normal limits    Narrative:     Performed at:  [facility]  [address] [place], [place] [address]   Phone [phone]   BRAIN NATRIURETIC PEPTIDE - Abnormal; Notable for the following components:    Pro- (*)     All other components within normal limits    Narrative:     Performed at:  [facility]  [address] [place] [facility] [address]   Phone [phone]   TROPONIN    Narrative:     Performed at:  [facility]  [address] [facility] [address]   Phone [phone]     CT CHEST PULMONARY EMBOLISM W CONTRAST   Final Result   1. No acute pulmonary thromboembolic disease. No pulmonary hypertension. 2.  No pulmonary mass or consolidation. No intrathoracic lymphadenopathy. 3.  Borderline cardiomegaly. Small pericardial effusion. XR CHEST (2 VW)   Final Result   No acute cardiopulmonary disease. Given her history of tachycardia and shortness of breath I recommend that patient be admitted to the hospital for further evaluation. Echocardiogram may be warranted. Agrees to admission.   We will discussed with hospitalist.        Patrick Arreaga MD  04/13/21 0339

## 2021-04-13 NOTE — H&P
[address] [place] [address]                              HISTORY AND PHYSICAL    PATIENT NAME: Isa Garduno                      :        1955  MED REC NO:   [id number]                          ROOM:       2115  ACCOUNT NO:   [de-identified]                           ADMIT DATE: 2021  PROVIDER:     Ray Nazario MD    I obtained the history and performed the physical exam on the patient in  the emergency room on 2021. CHIEF COMPLAINT:  Shortness of breath. HISTORY OF PRESENT ILLNESS:  The patient is a 26-year-old   female who presents to the hospital with chief complaints of a three-day  history of what she describes as a sudden onset of gradually  progressive, increasing exertional shortness of breath that got to a  point yesterday where she became really symptomatic and felt her heart  race where she could not even walk a few steps without any cough,  nausea, or vomiting. The patient denies any orthopnea or PND. Denies  any leg swelling. She states she has never had something like this  before. The patient recently had a back surgery, which was an anterior  posterior approach. PAST MEDICAL/PAST SURGICAL HISTORY:  1. Degenerative back disease. 2.  Hypertension. PAST SURGICAL HISTORY:  Back surgery. ALLERGIC HISTORY:  FLUOXETINE. FAMILY HISTORY:  Reviewed by me and is currently noncontributory. SOCIAL HISTORY:  Nonsmoker. No illicit substance use. The patient is  vaccinated against COVID with the Moderna vaccine and the last dose was  around four weeks ago. REVIEW OF SYSTEMS:  Significant for the shortness of breath and per the  history of present illness. All other systems have been reviewed and  are negative except for the history of present illness. MEDICATIONS:  The patient's home medication list reviewed and was  documented in the EMR.   This was reviewed by me.    PHYSICAL EXAMINATION:  VITAL SIGNS:  Temperature is 98.5, respiratory rate is 18, pulse is 104,  blood pressure 158/97, saturating 100%. CNS:  Alert, awake and oriented. PSYCH:  Cooperative, answering questions appropriately. HEENT:  Eyes:  Pupils are reactive to light. ENT:  Extraocular muscle  movements are intact. RESPIRATORY:  Clear to auscultation. CARDIOVASCULAR:  S1, S2 are heard. No murmurs, gallops, or rubs. The  patient does not have any abnormal respiratory sounds or any abnormal  cardiovascular sounds I could appreciate. ABDOMEN:  Nondistended. MUSCULOSKELETAL:  No acute obvious deformities. SKIN:  Without obvious rashes or lesions. DIAGNOSTIC DATA:  CT chest PE protocol shows some borderline  cardiomegaly and a small pericardial effusion. . Troponin less than 0.01. CBC showed white count of 6.1,  hemoglobin 11.6, hematocrit 34.6, platelets of 056. Basic metabolic  panel showed sodium 134, potassium 3.8, BUN 12, creatinine 0.9. Chest x-ray shows no acute cardiopulmonary abnormalities other than borderline cardiomegaly, per the radiologist.    CONSULTATIONS:  Currently none. ASSESSMENT:  1. Shortness of breath due to unclear cause. 2.  Hypertension. 3.  Anemia. 4.  Pericardial effusion. PLAN OF CARE:  The patient is admitted to Observation. Telemetry  monitoring will be continued. Serial troponins have been requested. Based on the further findings, the patient may need a consultation to  Cardiology or Pulmonology. DVT prophylaxis with Lovenox. CODE STATUS:  Full. EXPECTED LENGTH OF STAY:  Less than two midnights based on the plan of  care above. RISK: High due to the patient's presentation with the shortness of  breath. DISPOSITION:  Observation Telemetry.         Cristela Collins MD    D: 04/13/2021 1:32:07       T: 04/13/2021 2:52:32     [email or web address]  Job#: [id number]     Doc#: [id number]    CC:

## 2021-04-13 NOTE — PROGRESS NOTES
Medication Reconciliation    List of medications patient is currently taking is complete. Source of information: 1. Conversation with patient at bedside                                      2. EPIC records      Notes regarding home medications:   1. Patient received all of her morning home medications prior to arrival to the emergency department. 2. Patient reports that she is not taking gabapentin 300 mg.  3. Patient denies any other OTC or herbal medication use.     Noah Bearden, Pharmacy Intern  4/12/2021 10:42 PM

## 2021-04-13 NOTE — CARE COORDINATION
INITIAL CASE MANAGEMENT ASSESSMENT    Reviewed chart, met with patient to assess possible discharge needs. Explained Case Management role/services. Living Situation: confirmed address, lives with her daughter in a 1 story home with a couple steps to enter, has a basement, but does not access it often    ADLs: independent     DME: has a walker from previous surgery, but not using it    PT/OT Recs: N/A at this time     Active Services: none     Transportation: active , son will drive her home     Medications: confirmed Medicare and [place] [employer] [place], uses [employer] in [place] without issues    PCP: Dr. Luis Alberto Powell confirmed      HD/PD: N/A    PLAN/COMMENTS: Plan is to return home. She is waiting for the results of the ECHO done today. She denies needs at this time. CM provided contact information for patient or family to call with any questions. CM will follow and assist as needed.     Taylor Freire RN, BSN, Case Management  [phone]  Electronically signed by Taylor Freire RN on 4/13/2021 at 3:07 PM

## 2021-04-13 NOTE — ED PROVIDER NOTES
[address]      Pt Name: Kb Vivas  MRN: [id number]  [place] 1955  Date of evaluation: 4/12/2021  Provider: BLAKE Reyes    This patient was also seen and evaluated by the attending physician Domonique Barrios MD.      [address] [place] [address]       Chief Complaint   Patient presents with    Shortness of Breath     onset 3 days ago. pt complains of palpitations. pt with weakness. denies cough. nausea. denies chest pain       HISTORY OF PRESENT ILLNESS  (Location/Symptom, Timing/Onset, Context/Setting, Quality, Duration, Modifying Factors, Severity.)   Kb Vivas is a 76 y.o. female who presents to the emergency department with complaints of being easily winded for the last few days. She says for about 3 days she gets quite short of breath with physical activity, and is not getting any better. She says this is unusual for her, no history of heart problems or lung disease. She denies any chest pain involved with the symptoms, and denies any lightheadedness or feeling faint. She says she has had some nausea over the past few days that is persistent, and her appetite is relatively poor. No abdominal pain. She had spine surgery performed about 2 months ago, where the surgeon went in through the abdomen and the back, and patient says she has been recovering well from that, much improvement in her pain. Denies any history of blood clot or any use of blood thinning medication. She denies cough, cold symptoms, fever. No other complaints. Nursing Notes were reviewed and I agree. REVIEW OF SYSTEMS    (2-9 systems for level 4, 10 or more for level 5)     Constitutional:  Negative for fever, chills, unexpected weight change. HENT:  Negative for congestion, rhinorrhea, sneezing, sore throat, trouble swallowing. Eyes:  Negative for pain or visual disturbance. Respiratory:  Positive for dyspnea on exertion.   Negative for cough, wheezing, stridor. Cardiovascular: Negative for chest pain, palpitations, leg swelling. Gastrointestinal: Positive for nausea. Negative for vomiting, abdominal pain, diarrhea, constipation, blood in stool. Genitourinary:  Negative for dysuria, hematuria, flank pain, pelvic pain, abnormal vaginal bleeding. Musculoskeletal:  Negative for myalgias, arthralgias, neck pain or stiffness. Neurological:  Negative for dizziness, seizures, syncope, focal weakness, numbness, headache. Psychiatric/Behavioral:  Negative for suicidal ideas, hallucinations, confusion. Except as noted above the remainder of the review of systems was reviewed and negative. 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 tuberosity fx with delayed union    Depression     prescribed after death of spouse--5 years ago    HBP (high blood pressure)     History of blood transfusion     in child bearing years    PONV (postoperative nausea and vomiting)     With one surgery    S/P insertion of spinal cord stimulator     Spinal cord stimulator status     lower back--instructed to bring ID card DOS/pt states usually keeps it off       SURGICAL HISTORY           Procedure Laterality Date    BACK SURGERY  2007    lumbar fusion and cage    BACK SURGERY  10/03/2016     Right C5-6 and C6-7 laminotomy, partial facetectomy and foraminotomy     Microdissection using the operating room microscope.     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 Rohan Mukherjee FUSION N/A 2021    ANTERIOR LUMBAR INTERBODY FUSION L2-3, L3-4, L5-S1 WITH MEDTRONIC CAGE AND INFUSE performed by Du Myers MD at [address] [facility] N/A 2021    POSTERIOR LAMINECTOMY AND FUSION L2-S1 WITH MEDTRONIC RODS AND SCREWS WITH REMOVAL OF CLARITA AND SCREW L4-5 USING MEDTRONICS, EVOKES AND O-ARM  CPT CODE - 65015-12, 82200-28, 51862-53, 22457-17, 25919, 50624, 50307, 33771, 70869-24, 42879, 14918, 62628 performed by Du Myers MD at [address]       Previous Medications    AMLODIPINE (NORVASC) 10 MG TABLET    Take 10 mg by mouth daily    ASCORBIC ACID (VITAMIN C) 1000 MG TABLET    Take 1,000 mg by mouth daily    BUPROPION (WELLBUTRIN XL) 300 MG EXTENDED RELEASE TABLET    Take 300 mg by mouth every morning    CHOLECALCIFEROL (VITAMIN D3) 50 MCG ( UT) CAPS    Take by mouth    DOCUSATE SODIUM (COLACE) 100 MG CAPSULE    Take 1 capsule by mouth daily as needed for Constipation    GABAPENTIN (NEURONTIN) 300 MG CAPSULE    TAKE ONE CAPSULE BY MOUTH THREE TIMES A DAY FOR 30 DAYS    LISINOPRIL (PRINIVIL;ZESTRIL) 20 MG TABLET    Take 40 mg by mouth daily     MAGNESIUM 500 MG CAPS    Take by mouth 2 times daily    ONDANSETRON (ZOFRAN) 4 MG TABLET    Take 1 tablet by mouth 3 times daily as needed for Nausea or Vomiting    ZINC 50 MG CAPS    Take by mouth       ALLERGIES     Citalopram and Fluoxetine    FAMILY HISTORY           Problem Relation Age of Onset    COPD Mother     High Blood Pressure Other     Other Other         COPD    High Blood Pressure Father      Family Status   Relation Name Status    Mother      Other  (Not Specified)    Father          SOCIAL HISTORY      reports that she has never smoked. She has never used smokeless tobacco. She reports that she does not drink alcohol or use drugs.     PHYSICAL EXAM    (up to 7 for level 4, 8 or more for level 5)     ED Triage Vitals   BP Temp Temp Source Pulse Resp SpO2 Height Weight   21 1801 04/12/21 1801 04/12/21 1801 04/12/21 1801 04/12/21 1801 04/12/21 1801 04/12/21 1745 04/12/21 1745   (!) 158/97 98.5 °F (36.9 °C) Tympanic 104 18 100 % 5' 3\" (1.6 m) 116 lb 13.5 oz (53 kg)       Constitutional:  Appearing well-developed and well-nourished. No distress. HENT:  Normocephalic and atraumatic. Conjunctivae and EOM normal. PERRLA. Neck:  Normal range of motion. No tracheal deviation. Cardiovascular:  Normal rate, regular rhythm, normal heart sounds, intact distal pulses. Pulmonary/Chest:  Effort and breath sounds normal. No respiratory distress, wheezes or rales. Abdominal:  Soft. Bowel sounds normal. No tenderness, distension, mass, rebound or guarding. Musculoskeletal:  Normal range of motion. No edema exhibited. Neurological:  Alert and oriented to person, place, and time. No cranial nerve deficit observed. Skin:  Skin is warm and dry. Not diaphoretic. Psychiatric:  Normal mood, affect, behavior, judgment, thought content. DIAGNOSTIC RESULTS     RADIOLOGY:     Interpretation per the Radiologist below, if available at the time of this note:    CT CHEST PULMONARY EMBOLISM W CONTRAST   Final Result   1. No acute pulmonary thromboembolic disease. No pulmonary hypertension. 2.  No pulmonary mass or consolidation. No intrathoracic lymphadenopathy. 3.  Borderline cardiomegaly. Small pericardial effusion. XR CHEST (2 VW)   Final Result   No acute cardiopulmonary disease.              LABS:  Labs Reviewed   CBC WITH AUTO DIFFERENTIAL - Abnormal; Notable for the following components:       Result Value    RBC 3.82 (*)     Hemoglobin 11.6 (*)     Hematocrit 34.6 (*)     All other components within normal limits    Narrative:     Performed at:  [facility] [address] [place], [place] [address]   Phone [phone]   BASIC METABOLIC PANEL W/ REFLEX TO MG FOR LOW K - Abnormal; Notable for the following components:    Sodium 134 (*)     Chloride 97 (*)     Glucose 117 (*)     All other components within normal limits    Narrative:     Performed at:  [facility]  [address] [place], [place] [address]   Phone [phone]   BRAIN NATRIURETIC PEPTIDE - Abnormal; Notable for the following components:    Pro- (*)     All other components within normal limits    Narrative:     Performed at:  [facility]  [address] [place] [address]   Phone [phone]   TROPONIN    Narrative:     Performed at:  [facility]  [address] [place], [place] [address]   Phone [phone]       All other labs were within normal range or not returned as of this dictation. EMERGENCY DEPARTMENT COURSE and DIFFERENTIAL DIAGNOSIS/MDM:   Vitals:    Vitals:    04/12/21 1801 04/12/21 2121 04/12/21 2146 04/12/21 2201   BP: (!) 158/97 131/73 122/74 124/66   Pulse: 104      Resp: 18      Temp: 98.5 °F (36.9 °C)      TempSrc: Tympanic      SpO2: 100% 100% 100% 99%   Weight:       Height:           The patient's condition in the ED was stable, the patient was afebrile and nontoxic in appearance, and the patient's physical exam was unremarkable. Slightly tachycardic at presentation, with a heart rate of 104. Vital signs otherwise normal.  She was asymptomatic at rest.  EKG and chest x-ray showed no acute findings. Lab work was reassuring, with a negative troponin, normal BNP. CT pulmonary angiogram was negative for evidence of pulmonary embolism, but did show a small pericardial effusion. When patient was ambulated in the ED, her heart rate mauro to 140, she was visibly short of breath, and SPO2 fell to 93%. She is in need of hospital admission for further care. I consulted the hospitalist, who agreed to accept and admit the patient. PROCEDURES:  None    FINAL IMPRESSION      1. CRUZ (dyspnea on exertion)    2. Tachycardia    3.  Pericardial effusion          DISPOSITION/PLAN   DISPOSITION Decision To Admit 04/12/2021 11:39:21 PM      PATIENT REFERRED TO:  No follow-up provider specified.     DISCHARGE MEDICATIONS:  New Prescriptions    No medications on file       (Please note that portions of this note were completed with a voice recognition program.  Efforts were made to edit the dictations but occasionally words are mis-transcribed.)    Dakota Whitfield, [address] [facility], [address] [place] [address]  04/12/21 1073

## 2021-04-13 NOTE — DISCHARGE SUMMARY
Hospital Medicine Discharge Summary    Patient ID: Sophie Magallanes      Patient's PCP: BESS Love MD    Admit Date: 4/12/2021     Discharge Date:   4/13/21    Admitting Physician: Rachell Ellsworth MD     Discharge Physician: Kristina Galvez MD     Discharge Diagnoses: Active Hospital Problems    Diagnosis Date Noted    SOB (shortness of breath) [R06.02] 04/13/2021       The patient was seen and examined on day of discharge and this discharge summary is in conjunction with any daily progress note from day of discharge. Hospital Course:     Patient is a 79-year-old female with a past medical history of hypertension, degenerative back disease status post anterior/posterior fusion of L2-L3, L3-L4, L5-S1. Patient presented to the hospital with a 4-day history of progressive shortness of breath. In the emergency department her vitals were stable other than some tachycardia. She did have episodes when she would get up and her pulse would go up to the 130s but her oxygenation stayed at 93 or above. Patient's labs were negative for any acute pathology. EKG showed some sinus tachycardia without any acute process. Chest x-ray was performed and did not show any acute cardiac process. A CT of her chest was also ordered and was negative for any pulmonary embolism, pulmonary hypertension or pulmonary mass. She did have some slight pericardial effusion noted on CT scan. Echocardiogram was then performed and showed no acute pathology and trivial pericardial effusion. There was some concern that the patient could be having a vasculitic flare due to previous issues with IgA vasculitis. ESR and CRP were negative and an JERRI was also negative. An ANCA was drawn and is pending at the time of discharge. Patient informed that she is stable for discharge home and needs to follow-up with  rheumatology as she already had a previous appointment scheduled.     Patient refusing discharge as she would like a cardiology consult. Exam:     /70   Pulse 103   Temp 98 °F (36.7 °C) (Oral)   Resp 18   Ht 5' 3\" (1.6 m)   Wt 105 lb 9.6 oz (47.9 kg)   SpO2 98%   BMI 18.71 kg/m²     General appearance: No apparent distress, appears stated age and cooperative. HEENT: Pupils equal, round, and reactive to light. Conjunctivae/corneas clear. Neck: Supple, with full range of motion. No jugular venous distention. Trachea midline. Respiratory:  Normal respiratory effort. Clear to auscultation, bilaterally without Rales/Wheezes/Rhonchi. Cardiovascular: Regular rate and rhythm with normal S1/S2 without murmurs, rubs or gallops. Abdomen: Soft, non-tender, non-distended with normal bowel sounds. Musculoskeletal: No clubbing, cyanosis or edema bilaterally. Full range of motion without deformity. Skin: Skin color, texture, turgor normal.  No rashes or lesions. Neurologic:  Neurovascularly intact without any focal sensory/motor deficits. Cranial nerves: II-XII intact, grossly non-focal.  Psychiatric: Alert and oriented, thought content appropriate, normal insight      Consults:     None    Significant Diagnostic Studies:     CT CHEST PULMONARY EMBOLISM W CONTRAST   Final Result   1. No acute pulmonary thromboembolic disease. No pulmonary hypertension. 2.  No pulmonary mass or consolidation. No intrathoracic lymphadenopathy. 3.  Borderline cardiomegaly. Small pericardial effusion. XR CHEST (2 VW)   Final Result   No acute cardiopulmonary disease. Disposition: Home    Condition at Discharge: Stable    Discharge Instructions/Follow-up: PCP, [facility] rheumatology    Code Status:  Full Code     Activity: activity as tolerated    Diet: regular diet      Labs:  For convenience and continuity at follow-up the following most recent labs are provided:      CBC:    Lab Results   Component Value Date    WBC 6.1 04/12/2021    HGB 11.6 04/12/2021    HCT 34.6 04/12/2021     04/12/2021       Renal: Lab Results   Component Value Date     04/12/2021    K 3.8 04/12/2021    CL 97 04/12/2021    CO2 26 04/12/2021    BUN 12 04/12/2021    CREATININE 0.9 04/12/2021    CALCIUM 9.8 04/12/2021       Discharge Medications:     Current Discharge Medication List           Details   ondansetron (ZOFRAN) 4 MG tablet Take 1 tablet by mouth 3 times daily as needed for Nausea or Vomiting  Qty: 15 tablet, Refills: 0      docusate sodium (COLACE) 100 MG capsule Take 1 capsule by mouth daily as needed for Constipation  Qty: 30 capsule, Refills: 0      amLODIPine (NORVASC) 10 MG tablet Take 10 mg by mouth daily      zinc 50 MG CAPS Take by mouth      Ascorbic Acid (VITAMIN C) 1000 MG tablet Take 1,000 mg by mouth daily      Cholecalciferol (VITAMIN D3) 50 MCG (2000 UT) CAPS Take by mouth      Magnesium 500 MG CAPS Take by mouth 2 times daily      buPROPion (WELLBUTRIN XL) 300 MG extended release tablet Take 300 mg by mouth every morning      lisinopril (PRINIVIL;ZESTRIL) 20 MG tablet Take 40 mg by mouth daily              Future Appointments   Date Time Provider Hieu Jaycee   5/18/2021 10:40 AM Speedy Antonio MD [facility]       Time Spent on discharge is more than 30 minutes in the examination, evaluation, counseling and review of medications and discharge plan. Signed:    Marcellina Lundborg, MD   4/13/2021      Thank you BESS Sargent MD for the opportunity to be involved in this patient's care. If you have any questions or concerns please feel free to contact me at [phone].

## 2021-04-14 VITALS
HEIGHT: 63 IN | WEIGHT: 105.6 LBS | BODY MASS INDEX: 18.71 KG/M2 | DIASTOLIC BLOOD PRESSURE: 75 MMHG | RESPIRATION RATE: 20 BRPM | OXYGEN SATURATION: 96 % | TEMPERATURE: 97.9 F | SYSTOLIC BLOOD PRESSURE: 144 MMHG | HEART RATE: 86 BPM

## 2021-04-14 LAB
T4 FREE: 1.7 NG/DL (ref 0.9–1.8)
TSH SERPL DL<=0.05 MIU/L-ACNC: 1.79 UIU/ML (ref 0.27–4.2)

## 2021-04-14 PROCEDURE — 6370000000 HC RX 637 (ALT 250 FOR IP): Performed by: INTERNAL MEDICINE

## 2021-04-14 PROCEDURE — 94761 N-INVAS EAR/PLS OXIMETRY MLT: CPT

## 2021-04-14 PROCEDURE — 84443 ASSAY THYROID STIM HORMONE: CPT

## 2021-04-14 PROCEDURE — 99213 OFFICE O/P EST LOW 20 MIN: CPT | Performed by: NURSE PRACTITIONER

## 2021-04-14 PROCEDURE — 96372 THER/PROPH/DIAG INJ SC/IM: CPT

## 2021-04-14 PROCEDURE — G0378 HOSPITAL OBSERVATION PER HR: HCPCS

## 2021-04-14 PROCEDURE — 84439 ASSAY OF FREE THYROXINE: CPT

## 2021-04-14 PROCEDURE — 6360000002 HC RX W HCPCS: Performed by: INTERNAL MEDICINE

## 2021-04-14 PROCEDURE — 2580000003 HC RX 258: Performed by: INTERNAL MEDICINE

## 2021-04-14 PROCEDURE — 36415 COLL VENOUS BLD VENIPUNCTURE: CPT

## 2021-04-14 RX ADMIN — LISINOPRIL 40 MG: 20 TABLET ORAL at 08:35

## 2021-04-14 RX ADMIN — ENOXAPARIN SODIUM 40 MG: 40 INJECTION SUBCUTANEOUS at 08:44

## 2021-04-14 RX ADMIN — SODIUM CHLORIDE, PRESERVATIVE FREE 10 ML: 5 INJECTION INTRAVENOUS at 08:45

## 2021-04-14 RX ADMIN — ASPIRIN 81 MG: 81 TABLET, CHEWABLE ORAL at 08:35

## 2021-04-14 RX ADMIN — AMLODIPINE BESYLATE 10 MG: 10 TABLET ORAL at 08:43

## 2021-04-14 RX ADMIN — BUPROPION HYDROCHLORIDE 300 MG: 150 TABLET, EXTENDED RELEASE ORAL at 08:35

## 2021-04-14 NOTE — DISCHARGE INSTR - COC
no immunization history on file for this patient.     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    Cubital tunnel syndrome on right G56.21    Closed nondisplaced fracture of metatarsal bone of left foot with nonunion S92.302K    Hallux rigidus of left foot M20.22    Closed nondisplaced fracture of fifth metatarsal bone of right foot S92.354A    Magallanes fracture S99.199A    Ganglion cyst of volar aspect of left wrist M67.432    Closed displaced fracture of fourth metatarsal bone of left foot S92.342A    S/P lumbar spinal fusion Z98.1    Degenerative disc disease, lumbar M51.36    SOB (shortness of breath) R06.02       Isolation/Infection:   Isolation          No Isolation        Patient Infection Status     None to display          Nurse Assessment:  Last Vital Signs: /74   Pulse 94   Temp 97.9 °F (36.6 °C) (Oral)   Resp 20   Ht 5' 3\" (1.6 m)   Wt 105 lb 9.6 oz (47.9 kg)   SpO2 96%   BMI 18.71 kg/m²     Last documented pain score (0-10 scale): Pain Level: 0  Last Weight:   Wt Readings from Last 1 Encounters:   04/13/21 105 lb 9.6 oz (47.9 kg)     Mental Status:  {IP PT MENTAL STATUS:20030}    IV Access:  { FIORELLA IV ACCESS:499801893}    Nursing Mobility/ADLs:  Walking   {[facility] DME DYCX:671624903}  Transfer  {[facility] DME ZUYS:673357979}  Bathing  {[facility] DME WWBN:212607856}  Dressing  {[facility] DME QBAL:543242246}  Toileting  {[facility] DME HPYP:596677316}  Feeding  {[facility] DME MIVH:164224840}  Med Admin  {[facility] DME VWEW:247123425}  Med Delivery   { FIORELLA MED Delivery:000093597}    Wound Care Documentation and Therapy: Elimination:  Continence:   · Bowel: {YES / ZD:39664}  · Bladder: {YES / TM:69749}  Urinary Catheter: {Urinary Catheter:287231272}   Colostomy/Ileostomy/Ileal Conduit: {YES / MB:66343}       Date of Last BM: ***  No intake or output data in the 24 hours ending 21 1345  No intake/output data recorded.     Safety Concerns:     [facility] Safety Concerns:005226653}    Impairments/Disabilities:      [facility] Impairments/Disabilities:882932943}    Nutrition Therapy:  Current Nutrition Therapy:   [facility] Diet List:402225107}    Routes of Feeding: {[facility]P DME Other Feedings:395688591}  Liquids: {Slp liquid thickness:19618}  Daily Fluid Restriction: {CHP DME Yes amt example:400997614}  Last Modified Barium Swallow with Video (Video Swallowing Test): {Done Not Done AYIQ:866269877}    Treatments at the Time of Hospital Discharge:   Respiratory Treatments: ***  Oxygen Therapy:  {Therapy; copd oxygen:75789}  Ventilator:    { CC Vent JZKM:498709772}    Rehab Therapies: {THERAPEUTIC INTERVENTION:8771183288}  Weight Bearing Status/Restrictions: [facility] Weight Bearin}  Other Medical Equipment (for information only, NOT a DME order):  {EQUIPMENT:301275986}  Other Treatments: ***    Patient's personal belongings (please select all that are sent with patient):  {[facility] DME Belongings:612434301}    RN SIGNATURE:  {Esignature:035833304}    CASE MANAGEMENT/SOCIAL WORK SECTION    Inpatient Status Date: ***    Readmission Risk Assessment Score:  Readmission Risk              Risk of Unplanned Readmission:        0           Discharging to Facility/ Agency   · Name:   · Address:  · Phone:  · Fax:    Dialysis Facility (if applicable)   · Name:  · Address:  · Dialysis Schedule:  · Phone:  · Fax:    / signature: {Esignature:777853642}    PHYSICIAN SECTION    Prognosis: {Prognosis:1591995758}    Condition at Discharge: [address] [place] [address] Patient Condition:229926038}    Rehab Potential (if transferring to Rehab): {Prognosis:2850734427}    Recommended Labs or Other Treatments After Discharge: ***    Physician Certification: I certify the above information and transfer of Mary Viera  is necessary for the continuing treatment of the diagnosis listed and that she requires {Admit to Appropriate Level of Care:46007} for {GREATER/LESS:799042020} 30 days.      Update Admission H&P: {CHP DME Changes in RINIK:602762372}    PHYSICIAN SIGNATURE:  {Esignature:390009398}

## 2021-04-14 NOTE — PROGRESS NOTES
Patient refusing discharge would like cardiology consult. Charge nurse, nursing supervisor and physician aware. This RN, charge nurse, and nursing supervisor spoke with patient about concerns and plan of care for this shift. Patient agreeable with plan of care and resting comfortably at this time. Will continue to monitor and address needs.

## 2021-04-14 NOTE — DISCHARGE SUMMARY
Hospital Medicine Discharge Summary    Patient ID: Mary Viera      Patient's PCP: BESS Herrera MD    Admit Date: 4/12/2021     Discharge Date:   4/14/21    Admitting Physician: Ifeoma Ragland MD     Discharge Physician: Teresa Collins MD     Discharge Diagnoses: Active Hospital Problems    Diagnosis Date Noted    SOB (shortness of breath) [R06.02] 04/13/2021       The patient was seen and examined on day of discharge and this discharge summary is in conjunction with any daily progress note from day of discharge. Hospital Course:     Patient is a 19-year-old female with a past medical history of hypertension, degenerative back disease status post anterior/posterior fusion of L2-L3, L3-L4, L5-S1. Patient presented to the hospital with a 4-day history of progressive shortness of breath. In the emergency department her vitals were stable other than some tachycardia. She did have episodes when she would get up and her pulse would go up to the 130s but her oxygenation stayed at 93 or above. Patient's labs were negative for any acute pathology. EKG showed some sinus tachycardia without any acute process. Chest x-ray was performed and did not show any acute cardiac process. A CT of her chest was also ordered and was negative for any pulmonary embolism, pulmonary hypertension or pulmonary mass. She did have some slight pericardial effusion noted on CT scan. Echocardiogram was then performed and showed no acute pathology and trivial pericardial effusion. There was some concern that the patient could be having a vasculitic flare due to previous issues with IgA vasculitis. ESR and CRP were negative and an JERRI was also negative. An ANCA was drawn and is pending at the time of discharge. Patient informed that she is stable for discharge home and needs to follow-up with  rheumatology as she already had a previous appointment scheduled.     Patient refusing discharge as she would like a cardiology consult. Cardiology consulted on 4/14. TSH and T4 also drawn showing normal levels. Discussed case with Patients LEA who is also a physician. Cardiology assessed the patient and had concerns about possible deconditioning causing all of her symptoms. If patient continues to have symptoms after continued therapy she can follow-up in the outpatient for further care. Exam:     BP (!) 144/75   Pulse 86   Temp 97.9 °F (36.6 °C) (Oral)   Resp 20   Ht 5' 3\" (1.6 m)   Wt 105 lb 9.6 oz (47.9 kg)   SpO2 96%   BMI 18.71 kg/m²     General appearance: No apparent distress, appears stated age and cooperative. HEENT: Pupils equal, round, and reactive to light. Conjunctivae/corneas clear. Neck: Supple, with full range of motion. No jugular venous distention. Trachea midline. Respiratory:  Normal respiratory effort. Clear to auscultation, bilaterally without Rales/Wheezes/Rhonchi. Cardiovascular: Regular rate and rhythm with normal S1/S2 without murmurs, rubs or gallops. Abdomen: Soft, non-tender, non-distended with normal bowel sounds. Musculoskeletal: No clubbing, cyanosis or edema bilaterally. Full range of motion without deformity. Skin: Skin color, texture, turgor normal.  No rashes or lesions. Neurologic:  Neurovascularly intact without any focal sensory/motor deficits. Cranial nerves: II-XII intact, grossly non-focal.  Psychiatric: Alert and oriented, thought content appropriate, normal insight      Consults:     IP CONSULT TO CARDIOLOGY    Significant Diagnostic Studies:     CT CHEST PULMONARY EMBOLISM W CONTRAST   Final Result   1. No acute pulmonary thromboembolic disease. No pulmonary hypertension. 2.  No pulmonary mass or consolidation. No intrathoracic lymphadenopathy. 3.  Borderline cardiomegaly. Small pericardial effusion. XR CHEST (2 VW)   Final Result   No acute cardiopulmonary disease.              Disposition: Home    Condition at Discharge: Stable    Discharge Instructions/Follow-up: PCP,  rheumatology    Code Status:  Full Code     Activity: activity as tolerated    Diet: regular diet      Labs: For convenience and continuity at follow-up the following most recent labs are provided:      CBC:    Lab Results   Component Value Date    WBC 6.1 04/12/2021    HGB 11.6 04/12/2021    HCT 34.6 04/12/2021     04/12/2021       Renal:    Lab Results   Component Value Date     04/12/2021    K 3.8 04/12/2021    CL 97 04/12/2021    CO2 26 04/12/2021    BUN 12 04/12/2021    CREATININE 0.9 04/12/2021    CALCIUM 9.8 04/12/2021       Discharge Medications:     Current Discharge Medication List           Details   ondansetron (ZOFRAN) 4 MG tablet Take 1 tablet by mouth 3 times daily as needed for Nausea or Vomiting  Qty: 15 tablet, Refills: 0      docusate sodium (COLACE) 100 MG capsule Take 1 capsule by mouth daily as needed for Constipation  Qty: 30 capsule, Refills: 0      amLODIPine (NORVASC) 10 MG tablet Take 10 mg by mouth daily      zinc 50 MG CAPS Take by mouth      Ascorbic Acid (VITAMIN C) 1000 MG tablet Take 1,000 mg by mouth daily      Cholecalciferol (VITAMIN D3) 50 MCG (2000 UT) CAPS Take by mouth      Magnesium 500 MG CAPS Take by mouth 2 times daily      buPROPion (WELLBUTRIN XL) 300 MG extended release tablet Take 300 mg by mouth every morning      lisinopril (PRINIVIL;ZESTRIL) 20 MG tablet Take 40 mg by mouth daily              Future Appointments   Date Time Provider Hieu Tsei   5/17/2021  3:45 PM Vahe Tavarez MD [facility]   5/18/2021 10:40 AM Linnette Ochoa MD [facility] Officer [facility]       Time Spent on discharge is more than 30 minutes in the examination, evaluation, counseling and review of medications and discharge plan. Signed:    Mayme Leyden, MD   4/14/2021      Thank you BESS Guillory MD for the opportunity to be involved in this patient's care.  If you have any questions or concerns please feel free to contact me at [phone].

## 2021-04-14 NOTE — CARE COORDINATION
Spoke with the patient's nurse, Dr. Martha Elmore just ordered cardiology consult.   Korin Smith RN, BSN, Case Management  Phone: [phone]  Electronically signed by Korin Smith RN on 4/14/2021 at 8:43 AM

## 2021-04-14 NOTE — CONSULTS
The NP's [facility], EP NP) documentation has been prepared under my direction and personally reviewed by me in its entirety. I confirm that the consultation note created by the NP accurately reflects all work, physical examination, the discussion of treatments and procedures, and medical decision making by the NP. In addition, I have personally met with; performed a physical examination on; discussed the diagnosis (-es), treatment options including procedures, and formulated medical decisions for this patient. In brief, 76year old female h/o HTN, IgA vasculitis, DDD who underwent recent back surgery who presents with dyspnea. Cardiology was asked to consult for sinus tachycardia and explanation of findings from recent echocardiogram (specifically, mild regurgitation from MV and trivial pericardial effusion). EKGs and telemetry have shown sinus rhythm with rates  bpm. No abnormal cardiac findings on any of these testings, except that she is manifesting sinus tachycardia with exertoin with v-rates 140's bpm (most likely from physical deconditioning) and baseline elevated heart rates 90's bpm sinus, again most likely attributable to de-conditioning or else being upset. Spent much time reassuring the patient that these findings are appropriate and well within normal limits. The patient voiced understanding and is satisfied with explanations. EP service will sign off. Please refer to the NP's consult note for full details on the assessment and plan. Thank you for allowing us to participate in the care of your patient. If you have any questions, please do not hesitate to contact us.      Anibal Abbott MD, MS, [facility] [place], [facility]  Cardiac Electrophysiology  [address]  [address] [facility], [address] [place] [address] Adelfo Ordoñez [facility] [address]  [phone]

## 2021-04-14 NOTE — CONSULTS
Cardiac Electrophysiology Consultation     Date: 4/14/2021  Admit Date:  4/12/2021  Admission Diagnosis: SOB (shortness of breath) [R06.02]     Reason for Consultation: Sinus tachycardia  Consult Requesting Physician: J Carlos Husain MD       History of Present Illness  Kb Vivas is a 76y.o. year old female with past medical history significant for HTN, IgA vasculitis and DDD who presented to the ED with SOB. She has been noted to have sinus tachycardia with rates in the 100s that increases to the 130s-140s with activity. She had back surgery in February and her average HR during that admission appears to be in the 90s. She was told she couldn't walk more than 5 minutes at a time every hour. She is typically much more active than that. More recently, she has been trying to gradually do more activity but feels exhausted after taking a shower or going out to the mailbox. Over the last week or so she has noticed palpitations and dyspnea that occur only with activity and mostly with taking a shower or going out to get the mail. Once she is able to rest, her symptoms do gradually subside and resolve. She denies any symptoms at rest. No chest pain or syncope. She is concerned with her HR increasing with activity while on the monitor here along with her symptoms. Her work up has been essentially unremarkable, PE was ruled out, echo showed normal EF without any severe abnormalities. She was noted to have a trivial pericardial effusion on echo.        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    Depression     prescribed after death of spouse--5 years ago   Bridgette Alberts HBP (high blood pressure)     History of blood transfusion     in child bearing years    PONV (postoperative nausea and vomiting)     With one surgery    S/P insertion of spinal cord stimulator     Spinal cord stimulator status     lower back--instructed to bring ID card DOS/pt states usually keeps it off        Past Surgical History:   Procedure Laterality Date    BACK SURGERY  2007    lumbar fusion and cage    BACK SURGERY  10/03/2016     Right C5-6 and C6-7 laminotomy, partial facetectomy and foraminotomy     Microdissection using the operating room microscope.     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 Mora Todd N/A 2/11/2021    ANTERIOR LUMBAR INTERBODY FUSION L2-3, L3-4, L5-S1 WITH MEDTRONIC CAGE AND INFUSE performed by Alverto Reed MD at [address] N/A 2/11/2021    POSTERIOR LAMINECTOMY AND FUSION L2-S1 WITH MEDTRONIC RODS AND SCREWS WITH REMOVAL OF CLARITA AND SCREW L4-5 USING MEDTRONICS, EVOKES AND O-ARM  CPT CODE - 23889-46, [id number], 19141-68, 03876-15, 65113, 18382, 20248, 91570, 40029-01, 26232, 77029, 42569 performed by Alverto Reed MD at [facility]       Current Outpatient Medications   Medication Instructions    amLODIPine (NORVASC) 10 mg, Oral, DAILY    buPROPion (WELLBUTRIN XL) 300 mg, Oral, EVERY MORNING    Cholecalciferol (VITAMIN D3) 50 MCG (2000 UT) CAPS Oral    docusate sodium (COLACE) 100 mg, Oral, DAILY PRN    lisinopril (PRINIVIL;ZESTRIL) 40 mg, Oral, DAILY    Magnesium 500 MG CAPS Oral, 2 TIMES DAILY    ondansetron (ZOFRAN) 4 mg, Oral, 3 TIMES DAILY PRN    vitamin C 1,000 mg, Oral, DAILY    zinc 50 MG CAPS Oral        Allergies   Allergen Reactions    Citalopram Other (See Comments)     Restless Leg Syndrome    Fluoxetine Nausea Only       Social History:   reports that she has never smoked. She has never used smokeless tobacco. She reports that she does not drink alcohol or use drugs. Family History:  family history includes COPD in her mother; High Blood Pressure in her father and another family member; Other in an other family member. Review of Systems:  · General: negative for fever, chills   · Ophthalmic ROS: negative for eye pain or loss of vision  · ENT ROS: negative for headaches, sore throat, nasal drainage  · Respiratory: positive for CRUZ, negative for cough, sputum. · Cardiovascular: positive for palpitations, negative for chest pain. · Gastrointestinal: negative for abdominal pain, diarrhea, N/V  · Hematology: negative for bleeding, blood clots, bruising or jaundice  · Genito-Urinary:  negative for dysuria or incontinence  · Musculoskeletal: negative for joint swelling, muscle pain  · Neurological: negative for confusion, dizziness, headaches   · Psychiatric: negative anxiety, depression  · Dermatological: negative for rash    Medications:  Scheduled Meds:   buPROPion  300 mg Oral QAM    lisinopril  40 mg Oral Daily    amLODIPine  10 mg Oral Daily    sodium chloride flush  5-40 mL Intravenous 2 times per day    enoxaparin  40 mg Subcutaneous Daily    aspirin  81 mg Oral Daily      Continuous Infusions:   sodium chloride       PRN Meds:.sodium chloride flush, sodium chloride, promethazine **OR** ondansetron, potassium chloride, magnesium sulfate     Physical Examination:  Vitals:    21 1100   BP:    Pulse: 93   Resp:    Temp:    SpO2: 96%      No intake or output data in the 24 hours ending 21 1108  No intake/output data recorded.    Wt Readings from Last 3 Encounters:   21 105 lb 9.6 oz (47.9 kg)   21 119 lb (54 kg)   21 119 lb (54 kg)     Temp  Av °F (36.7 °C)  Min: 97.9 °F (36.6 °C)  Max: 98 °F (36.7 °C)  Pulse  Av.6  Min: 82  Max: 143  BP  Min: 116/63  Max: 146/86  SpO2  Av.6 %  Min: 96 %  Max: 100 %    · Telemetry: Sinus rhythm in the 90s. · Constitutional: Alert, in no acute distress. Appears stated age. · Head: Normocephalic and atraumatic. · Eyes: Conjunctivae normal. EOM are normal.   · Neck: Neck supple. No lymphadenopathy. No rigidity. No JVD present. · Cardiovascular: Normal rate, regular rhythm. No murmurs, rubs or gallops. No S3 or S4.  · Pulmonary/Chest: Clear breath sounds bilaterally. No crackles, wheezes or rhonchi. No respiratory accessory muscle use. · Abdominal: Soft. Normal bowel sounds present. No distension, No tenderness. · Musculoskeletal: No tenderness. No edema    · Lymphadenopathy: Has no cervical adenopathy. · Neurological: Alert and oriented. No gross deficits. · Skin: Skin is warm and dry. No rash, lesions, ulcerations noted. · Psychiatric: No anxiety nor agitation. Labs:  Reviewed. Recent Labs     21  1843   *   K 3.8   CL 97*   CO2 26   BUN 12   CREATININE 0.9     Recent Labs     21  1843   WBC 6.1   HGB 11.6*   HCT 34.6*   MCV 90.6        Lab Results   Component Value Date    TROPONINI <0.01 2021     No results found for: BNP  Lab Results   Component Value Date    PROTIME 10.7 10/03/2019    PROTIME 9.8 10/01/2015    INR 0.94 10/03/2019    INR 0.91 10/01/2015     No results found for: CHOL, HDL, TRIG    Diagnostic and imaging results reviewed. EC21  ST at 101 BPM. Possible LA enlargement. Echo: 21  Summary   Normal left ventricle size, wall thickness, and systolic function with an   estimated ejection fraction of 55-60%. No regional wall motion abnormalities   are seen. The right ventricle is normal in size and function. Trivial mitral regurgitation. Mild tricuspid regurgitation. Trivial pericardial effusion. Stress: 16  Summary    Pharmacological Stress/MPI Results:        1.  Technically a satisfactory study.    2. Normal pharmacological stress portion of the study.    3. No evidence of Ischemia by Myocardial Perfusion Imaging.    4. Gated Study shows normal LV size and Systolic function; EF is 68 %. Assessment & Plan:    Sinus tachycardia   - brought on by activity and accompanied by CRUZ, her HR does gradually improve once she is able to rest   - likely secondary to deconditioning, does not appear to be inappropriate ST   - PE ruled out, echo just showed a trivial pericardial effusion which is likely not contributing - I did discuss these with the pt and tried to reassure her of these findings    CRUZ   - also likely secondary to deconditioning   - ruled out for AMI   - can consider further outpatient work up if symptoms persist despite better conditioning    Discussed with Dr. Sylvester Melendez.     NEPTALI Arrington  [facility] [address], [address] [place] [address]  Phone: [phone]  Fax: [phone]    Electronically signed by NEPTALI Nichols - CNP on 4/14/2021 at 11:08 AM

## 2021-04-14 NOTE — PLAN OF CARE
Problem: Safety:  Goal: Free from accidental physical injury  Description: Free from accidental physical injury  Outcome: Met This Shift  Note: Independent transfers without incident. Goal: Free from intentional harm  Description: Free from intentional harm  Outcome: Met This Shift     Problem: Daily Care:  Goal: Daily care needs are met  Description: Daily care needs are met  Outcome: Met This Shift     Problem: Pain:  Goal: Patient's pain/discomfort is manageable  Description: Patient's pain/discomfort is manageable  Outcome: Met This Shift  Note: Pt denying pain with all assessment     Problem: Skin Integrity:  Goal: Skin integrity will stabilize  Description: Skin integrity will stabilize  Outcome: Met This Shift  Note: Per pt no skin issues to be aware of. Problem: Discharge Planning:  Goal: Patients continuum of care needs are met  Description: Patients continuum of care needs are met  Outcome: Met This Shift     Problem: Infection:  Goal: Will remain free from infection  Description: Will remain free from infection  Outcome: Ongoing  Note: Afebrile.  No signs of infection

## 2021-04-15 LAB — ANCA IFA: NORMAL

## 2021-04-22 ENCOUNTER — TELEPHONE (OUTPATIENT)
Dept: ORTHOPEDIC SURGERY | Age: 66
End: 2021-04-22

## 2021-04-23 DIAGNOSIS — M48.062 SPINAL STENOSIS OF LUMBAR REGION WITH NEUROGENIC CLAUDICATION: Primary | ICD-10-CM

## 2021-05-18 ENCOUNTER — OFFICE VISIT (OUTPATIENT)
Dept: ORTHOPEDIC SURGERY | Age: 66
End: 2021-05-18
Payer: MEDICARE

## 2021-05-18 VITALS — WEIGHT: 105 LBS | HEIGHT: 63 IN | BODY MASS INDEX: 18.61 KG/M2

## 2021-05-18 DIAGNOSIS — Z98.1 S/P LUMBAR FUSION: Primary | ICD-10-CM

## 2021-05-18 PROCEDURE — G8420 CALC BMI NORM PARAMETERS: HCPCS | Performed by: PHYSICIAN ASSISTANT

## 2021-05-18 PROCEDURE — 4040F PNEUMOC VAC/ADMIN/RCVD: CPT | Performed by: PHYSICIAN ASSISTANT

## 2021-05-18 PROCEDURE — 1090F PRES/ABSN URINE INCON ASSESS: CPT | Performed by: PHYSICIAN ASSISTANT

## 2021-05-18 PROCEDURE — 3017F COLORECTAL CA SCREEN DOC REV: CPT | Performed by: PHYSICIAN ASSISTANT

## 2021-05-18 PROCEDURE — 1036F TOBACCO NON-USER: CPT | Performed by: PHYSICIAN ASSISTANT

## 2021-05-18 PROCEDURE — G8399 PT W/DXA RESULTS DOCUMENT: HCPCS | Performed by: PHYSICIAN ASSISTANT

## 2021-05-18 PROCEDURE — G8427 DOCREV CUR MEDS BY ELIG CLIN: HCPCS | Performed by: PHYSICIAN ASSISTANT

## 2021-05-18 PROCEDURE — 99213 OFFICE O/P EST LOW 20 MIN: CPT | Performed by: PHYSICIAN ASSISTANT

## 2021-05-18 PROCEDURE — 1123F ACP DISCUSS/DSCN MKR DOCD: CPT | Performed by: PHYSICIAN ASSISTANT

## 2021-05-18 NOTE — PROGRESS NOTES
She will return in 2-3 months time for repeat x-rays. If she continues to have pain over the right trochanteric bursa she would be a candidate for a trochanteric bursa injection in the future.     Patient examined and note dictated by Kt Wall PA-C.

## 2021-07-27 ENCOUNTER — OFFICE VISIT (OUTPATIENT)
Dept: ORTHOPEDIC SURGERY | Age: 66
End: 2021-07-27
Payer: MEDICARE

## 2021-07-27 ENCOUNTER — TELEPHONE (OUTPATIENT)
Dept: ORTHOPEDIC SURGERY | Age: 66
End: 2021-07-27

## 2021-07-27 VITALS — WEIGHT: 105 LBS | BODY MASS INDEX: 18.61 KG/M2 | HEIGHT: 63 IN

## 2021-07-27 DIAGNOSIS — Z98.1 S/P LUMBAR FUSION: Primary | ICD-10-CM

## 2021-07-27 PROCEDURE — 4040F PNEUMOC VAC/ADMIN/RCVD: CPT | Performed by: ORTHOPAEDIC SURGERY

## 2021-07-27 PROCEDURE — 1123F ACP DISCUSS/DSCN MKR DOCD: CPT | Performed by: ORTHOPAEDIC SURGERY

## 2021-07-27 PROCEDURE — G8399 PT W/DXA RESULTS DOCUMENT: HCPCS | Performed by: ORTHOPAEDIC SURGERY

## 2021-07-27 PROCEDURE — 1036F TOBACCO NON-USER: CPT | Performed by: ORTHOPAEDIC SURGERY

## 2021-07-27 PROCEDURE — G8427 DOCREV CUR MEDS BY ELIG CLIN: HCPCS | Performed by: ORTHOPAEDIC SURGERY

## 2021-07-27 PROCEDURE — 1090F PRES/ABSN URINE INCON ASSESS: CPT | Performed by: ORTHOPAEDIC SURGERY

## 2021-07-27 PROCEDURE — 99213 OFFICE O/P EST LOW 20 MIN: CPT | Performed by: ORTHOPAEDIC SURGERY

## 2021-07-27 PROCEDURE — G8420 CALC BMI NORM PARAMETERS: HCPCS | Performed by: ORTHOPAEDIC SURGERY

## 2021-07-27 PROCEDURE — 3017F COLORECTAL CA SCREEN DOC REV: CPT | Performed by: ORTHOPAEDIC SURGERY

## 2021-08-06 ENCOUNTER — HOSPITAL ENCOUNTER (OUTPATIENT)
Dept: PHYSICAL THERAPY | Age: 66
Setting detail: THERAPIES SERIES
Discharge: HOME | End: 2021-08-06
Payer: MEDICARE

## 2021-08-06 PROCEDURE — 97161 PT EVAL LOW COMPLEX 20 MIN: CPT

## 2021-08-06 PROCEDURE — 97110 THERAPEUTIC EXERCISES: CPT

## 2021-08-06 NOTE — PROGRESS NOTES
[facility] [address] [address]  Phone: [phone]   Fax:     [phone]                                                       Physical Therapy Certification    Dear Referring Practitioner: Dr. Sherren Earls,    We had the pleasure of evaluating the following patient for physical therapy services at [facility]. A summary of our findings can be found in the initial assessment below. This includes our plan of care. If you have any questions or concerns regarding these findings, please do not hesitate to contact me at the office phone number checked above. Thank you for the referral.       Physician Signature:_______________________________Date:__________________  By signing above (or electronic signature), therapists plan is approved by physician                  Patient: Edwin Brown   : 1955   MRN: [id number]  Referring Physician: Referring Practitioner: Dr. Sherren Earls      Evaluation Date: 2021      Medical Diagnosis Information:  Diagnosis: S/P lumbar fusion (Z98.1)   Treatment Diagnosis: Decreased ADL status                                         Insurance information: PT Insurance Information: Medicare     Precautions/ Contra-indications:   Latex Allergy:  [x]NO      []YES  Preferred Language for Healthcare:   [x]English       []other:    C-SSRS Triggered by Intake questionnaire (Past 2 wk assessment ):   [x] No, Questionnaire did not trigger screening.   [] Yes, Patient intake triggered C-SSRS Screening      [] C-SSRS Screening completed  [] PCP notified via Epic     SUBJECTIVE: Patient had lumbar fusion 21- anterior lumbar interbody fusion L2-S1 and posterior laminectomy and fusion L2-S1. Pt stated after surgery she had R leg pain and weakness- \"my leg would collapse. \"  Pt stated she cannot walk long distances anymore, it seems like my hip wants to turn in.\"   Pt stated after walking 10 minutes she has a lot of pain. \"I used to walk miles. I got a little dog that I walk. \"  Pt stated stair climbing \"hurts, but I can do it. \"  Getting out of chairs \"hurts, but I can do it. \"  Pt has a lift chair and no longer uses it. Pt stated she is \"now allowed to bend and lift, what ever feels okay. He lifted the restriction. \" \"I feel like I limp all the time. \"  Pt wakes from rolling in bed.     Relevant Medical History:  Functional Scale/Score: Oswestry = 8 sections completed, 13 raw     Pain Scale: 5-8/10 at B lumbosacral region, R buttock, deep  Easing factors: \"sometimes sitting for a short while, but after a while then it hurts,\" leaning over  Provocative factors: walking     Type: [x]Constant   []Intermittent  []Radiating []Localized []other:     Numbness/Tingling: none    Occupation/School: retired    Living Status/Prior Level of Function: Independent with ADLs and IADLs    OBJECTIVE:     ROM  Comments   Lumbar Flex WFL, minimal LBP    Lumbar Ext WFL      ROM LEFT RIGHT Comments   Lumbar Side Bend NT NT    Lumbar Rotation WFL WFL    LE WFL WFL    Hamstring Flex WFL WFL    Piriformis tight tight       Myotomes/Strength Normal Abnormal Comments   [x]ALL NORMAL      Hip Abd   R 4/5   Hip ADD   R 4+/5   Hip Ext   R 4-/5, pain   Hip flexion (L1-L2 femoral) [] [x] R 4/5   Knee extension (L2-L4 femoral) [] [x] R 4+/5   Knee flexion (S1 sciatic) normal     Dorsiflexion (L4-L5 deep peroneal) [x] []    Great Toe Ext (L5 deep peroneal nerve) [x] []    Ankle Eversion (S1-S2 super peroneal) [x] []    Ankle PF(S1-S2 tibial) [x] []    Multifidus [] []    Transverse Ab [] []      Dermatomes Normal Abnormal Comments   [x]ALL NORMAL            inguinal area (L1)  [] []    anterior mid-thigh (L2) [] []    distal ant thigh/med knee (L3) [] []    medial lower leg and foot (L4) [] []    lateral lower leg and foot (L5) [] []    posterior calf (S1) [] []    medial calcaneus (S2) [] []      Reflexes Normal Abnormal Comments   []ALL NORMAL            S1-2 Seated achilles [x] []    S1-2 Prone knee bend [] []    L3-4 Patellar tendon [x] []    C5-6 Biceps [] []    C6 Brachioradialis [] []    C7-8 Triceps [] []    Clonus [x] []    Babinski [] []    Kim's [] []        Palpation: TTP R > L lower lumbar paraspinals, R > L buttocks; hypertonic B lumbar paraspinals    Functional Mobility/Transfers: WFL    Posture: WFL    Gait: (include devices/WB status) minimal decrease in R weight shift and step length vs L    Bandages/Dressings/Incisions: closed incision sacrum- T12    Orthopedic Special Tests:   Neural dynamic tension testing Normal Abnormal Comments   Slump Test  - Degree of knee flexion:  [x] []    SLR  [x] []    0-30 [] []    30-70 [] []    Femoral nerve (L2-4) [] []       Normal Abnormal N/A Comments   Fwd Bend-aberrant or innominate mvmt) [] [] []    Trendelenburg [] [] []    Kemps/Quadrant [] [] []    [place] [] [] []    MAEGAN/Yfn [] [] []    Hip scour [] [] []    Supine to sit [] [] []    Prone knee bend [] [] []           Hip thrust [] [] []    SI distraction/compression [] [] []    [place]/thrust [] [] []               [x] Patient history, allergies, meds reviewed. Medical chart reviewed. See intake form. Review Of Systems (ROS):  [x]Performed Review of systems (Integumentary, CardioPulmonary, Neurological) by intake and observation. Intake form has been scanned into medical record. Patient has been instructed to contact their primary care physician regarding ROS issues if not already being addressed at this time.       Co-morbidities/Complexities (which will affect course of rehabilitation):   []None           Arthritic conditions   []Rheumatoid arthritis (M05.9)  [x]Osteoarthritis (M19.91)   Cardiovascular conditions   [x]Hypertension (I10)  []Hyperlipidemia (E78.5)  []Angina pectoris (I20)  []Atherosclerosis (I70)  []CVA Musculoskeletal conditions   []Disc positions   []Reduced ability to maintain good posture and demonstrate good body mechanics with sitting, bending, and lifting   [x]Reduced ability to sleep   [] Reduced ability or tolerance with driving and/or computer work   [x]Reduced ability to perform lifting, reaching, carrying tasks   [x]Reduced ability to squat   [x]Reduced ability to forward bend   [x]Reduced ability to ambulate prolonged functional periods/distances/surfaces   []Reduced ability to ascend/descend stairs   []other:       Participation Restrictions   [x]Reduced participation in self care activities   [x]Reduced participation in home management activities   []Reduced participation in work activities   [x]Reduced participation in social activities. []Reduced participation in sport/recreational activities. Classification:   [x]Signs/symptoms consistent with Lumbar instability/stabilization subgroup. []Signs/symptoms consistent with Lumbar mobilization/manipulation subgroup, myotomes and dermatomes intact. Meets manipulation criteria. []Signs/symptoms consistent with Lumbar direction specific/centralization subgroup   []Signs/symptoms consistent with Lumbar traction subgroup       []Signs/symptoms consistent with lumbar facet dysfunction   []Signs/symptoms consistent with lumbar stenosis type dysfunction   []Signs/symptoms consistent with nerve root involvement including myotome & dermatome dysfunction   [x]Signs/symptoms consistent with post-surgical status including: decreased ROM, strength and function.    []signs/symptoms consistent with pathology which may benefit from Dry needling     []other:      Prognosis/Rehab Potential:      []Excellent   [x]Good    []Fair   []Poor    Tolerance of evaluation/treatment:    []Excellent   [x]Good    []Fair   []Poor     Physical Therapy Evaluation Complexity Justification  [x] A history of present problem with:  [] no personal factors and/or comorbidities that impact the plan of care;  []1-2 personal factors and/or comorbidities that impact the plan of care  [x]3 personal factors and/or comorbidities that impact the plan of care  [x] An examination of body systems using standardized tests and measures addressing any of the following: body structures and functions (impairments), activity limitations, and/or participation restrictions;:  [] a total of 1-2 or more elements   [] a total of 3 or more elements   [x] a total of 4 or more elements   [x] A clinical presentation with:  [x] stable and/or uncomplicated characteristics   [] evolving clinical presentation with changing characteristics  [] unstable and unpredictable characteristics;   [x] Clinical decision making of [] low, [] moderate, [] high complexity using standardized patient assessment instrument and/or measurable assessment of functional outcome. [x] EVAL (LOW) 37221 (typically 20 minutes face-to-face)  [] EVAL (MOD) 71659 (typically 30 minutes face-to-face)  [] EVAL (HIGH) 18107 (typically 45 minutes face-to-face)  [] RE-EVAL     PLAN: Begin PT focusing on: proximal hip mobilizations, LB mobs, LB core activation, proximal hip activation, and HEP    Frequency/Duration:  2 days per week for 6 Weeks:  Interventions:  [x]  Therapeutic exercise including: strength training, ROM, for LE, Glutes and core   [x]  NMR activation and proprioception for glutes , LE and Core   [x]  Manual therapy as indicated for Hip complex, LE and spine to include: Dry Needling/IASTM, STM, PROM, Gr I-IV mobilizations, manipulation. [x]  Modalities as needed that may include: thermal agents, E-stim, Biofeedback, US, iontophoresis as indicated  [x]  Patient education on joint protection, postural re-education, activity modification, progression of HEP.     HEP instruction: Yifan Cassis add set, pelvic tilt, fall out, bridge, piriformis stretch    GOALS:  Patient stated goal: \"get rid of pain\"  [] Progressing: [] Met: [] Not Met: [] Adjusted  Therapist goals for Patient: Short Term Goals: To be achieved in: 2 weeks  1. Independent in HEP and progression per patient tolerance, in order to prevent re-injury. [] Progressing: [] Met: [] Not Met: [] Adjusted  2. Patient will have a decrease in pain to facilitate improvement in movement, function, and ADLs as indicated by Functional Deficits. [] Progressing: [] Met: [] Not Met: [] Adjusted    Long Term Goals: To be achieved in: 6 weeks  1. Disability index score of 7% or less for the CLAUDINE to assist with reaching prior level of function. [] Progressing: [] Met: [] Not Met: [] Adjusted  2. Patient will demonstrate increased AROM to WNL, good LS mobility, good hip ROM to allow for proper joint functioning as indicated by patients Functional Deficits. [] Progressing: [] Met: [] Not Met: [] Adjusted  3. Patient will demonstrate an increase in Strength to good proximal hip and core activation to allow for proper functional mobility as indicated by patients Functional Deficits. [] Progressing: [] Met: [] Not Met: [] Adjusted  4. Patient will return to walking 1-2 miles without increased symptoms or restriction. [] Progressing: [] Met: [] Not Met: [] Adjusted  5. Patient will be able to garden/do yard work. [] Progressing: [] Met: [] Not Met: [] Adjusted     Electronically signed by:  Laurie Adams PT , OMT-C, [id number]          Note: If patient does not return for scheduled/recommended follow up visits, this note will serve as a discharge from care along with the most recent update on progress.

## 2021-08-06 NOTE — FLOWSHEET NOTE
[facility], [place] [address]. [place] [address]  Phone: [phone]   Fax:     [phone]    Physical Therapy Treatment Note/ Progress Report:     Date:  2021    Patient Name:  Sruthi Hunltey    :  1955  MRN: [id number]    Pertinent Medical History:      Medical/Treatment Diagnosis Information:  · Diagnosis: S/P lumbar fusion (Z98.1)  · Treatment Diagnosis: Decreased ADL status    Insurance/Certification information:  PT Insurance Information: Medicare  Physician Information:  Referring Practitioner: Dr. Mica Allen of care signed (Y/N): N    Date of Patient follow up with Physician:      Progress Report: []  Yes  [x]  No     Date Range for reporting period:  Beginning:   Ending:    Progress report due (10 Rx/or 30 days whichever is less):      Recertification due (POC duration/ or 90 days whichever is less):     Visit # POC/ Insurance Allowable Auth Needed   1 12 []Yes    []No     Pain level:   5-8/10 at B lumbosacral region, R buttock, deep  Functional Scale: 21 Oswestry = 8 sections completed, 13 raw        History of Injury: Patient had lumbar fusion 21- anterior lumbar interbody fusion L2-S1 and posterior laminectomy and fusion L2-S1. Pt stated after surgery she had R leg pain and weakness- \"my leg would collapse. \"  Pt stated she cannot walk long distances anymore, it seems like my hip wants to turn in.\"   Pt stated after walking 10 minutes she has a lot of pain. \"I used to walk miles. I got a little dog that I walk. \"  Pt stated stair climbing \"hurts, but I can do it. \"  Getting out of chairs \"hurts, but I can do it. \"  Pt has a lift chair and no longer uses it. Pt stated she is \"now allowed to bend and lift, what ever feels okay. He lifted the restriction. \" \"I feel like I limp all the time. \"  Pt wakes from rolling in bed.       SUBJECTIVE:  See eval    OBJECTIVE:  Observation:     8/6/21  Palpation: TTP R > L lower lumbar paraspinals, R > L buttocks; hypertonic B lumbar paraspinals  Gait: (include devices/WB status) minimal decrease in R weight shift and step length vs L   Bandages/Dressings/Incisions: closed incision sacrum- T12      Test measurements:      ROM   Comments   Lumbar Flex WFL, minimal LBP     Lumbar Ext WFL        ROM LEFT RIGHT Comments   Piriformis tight tight        Myotomes/Strength Normal Abnormal Comments   [x]? ALL NORMAL         Hip Abd     R 4/5   Hip ADD     R 4+/5   Hip Ext     R 4-/5, pain   Hip flexion (L1-L2 femoral) []?  [x]?  R 4/5   Knee extension (L2-L4 femoral) []?  [x]?  R 4+/5   Multifidus []?  [x]?       Transverse Ab []?  [x]?             RESTRICTIONS/PRECAUTIONS: back surgery    Exercises/Interventions:     Therapeutic Ex (19841)   Min: Repetitions/Resistance Notes/Cues   Nu Step     Stretch  Hamstrings  Gastroc     Standing hip  ABD  Ext                                   Manual Intervention (05865) Min:     STM R lumbar     STM R piriformis     NMR re-education (09789)   Min:     PPT  Bridge  Fall outs  Fall outs with t-band     Bridge with add [place] with ABD vs t-band     Hook lying march          Therapeutic Activity (09761) Min:               Modalities  Min:             Other Therapeutic Activities:  Pt was educated on PT POC, Diagnosis, Prognosis, pathomechanics as well as frequency and duration of scheduling future physical therapy appointments. Time was also taken on this day to answer all patient questions and participation in PT. Reviewed appointment policy in detail with patient and patient verbalized understanding.      Home Exercise Program:[id number] add set, pelvic tilt, fall out, bridge, piriformis stretch       Therapeutic Exercise and NMR EXR  [] (65717) Provided verbal/tactile cueing for activities related to strengthening, flexibility, endurance, ROM  for improvements in proximal hip and core control with self care, mobility, lifting and ambulation.  [] (14017) Provided verbal/tactile cueing for activities related to improving balance, coordination, kinesthetic sense, posture, motor skill, proprioception  to assist with core control in self care, mobility, lifting, and ambulation. Therapeutic Activities:    [] (23378 or 30081) Provided verbal/tactile cueing for activities related to improving balance, coordination, kinesthetic sense, posture, motor skill, proprioception and motor activation to allow for proper function  with self care and ADLs  [] (77562) Provided training and instruction to the patient for proper core and proximal hip recruitment and positioning with ambulation re-education     Home Exercise Program:    [] (75532) Reviewed/Progressed HEP activities related to strengthening, flexibility, endurance, ROM of core, proximal hip and LE for functional self-care, mobility, lifting and ambulation   [] (49500) Reviewed/Progressed HEP activities related to improving balance, coordination, kinesthetic sense, posture, motor skill, proprioception of core, proximal hip and LE for self care, mobility, lifting, and ambulation      Manual Treatments:  PROM / STM / Oscillations-Mobs:  G-I, II, III, IV (PA's, Inf., Post.)  [] (12538) Provided manual therapy to mobilize proximal hip and LS spine soft tissue/joints for the purpose of modulating pain, promoting relaxation,  increasing ROM, reducing/eliminating soft tissue swelling/inflammation/restriction, improving soft tissue extensibility and allowing for proper ROM for normal function with self care, mobility, lifting and ambulation.        Charges:  Timed Code Treatment Minutes: 15   Total Treatment Minutes: 35     [x] EVAL (LOW) 88739 (typically 20 minutes face-to-face)  [] EVAL (MOD) 75840 (typically 30 minutes face-to-face)  [] EVAL (HIGH) 78706 (typically 45 minutes face-to-face)  [] RE-EVAL     [x] EX(54393) x     [] Dry needle 1 or 2 Muscles (07235)  [] NMR (31193) x     [] Dry needle 3+ Muscles (80854)  [] Manual (23161) x     [] Ultrasound (20132) x  [] TA (06732) x     [] Mech Traction (86542)  [] ES(attended) (52788)     [] ES (un) (33880):   [] Vasopump (96245) [] Ionto (88492)   [] Other:    GOALS:  Patient stated goal: \"get rid of pain\"  []? Progressing: []? Met: []? Not Met: []? Adjusted  Therapist goals for Patient:   Short Term Goals: To be achieved in: 2 weeks  1. Independent in HEP and progression per patient tolerance, in order to prevent re-injury. []? Progressing: []? Met: []? Not Met: []? Adjusted  2. Patient will have a decrease in pain to facilitate improvement in movement, function, and ADLs as indicated by Functional Deficits. []? Progressing: []? Met: []? Not Met: []? Adjusted     Long Term Goals: To be achieved in: 6 weeks  1. Disability index score of 7% or less for the CLAUDINE to assist with reaching prior level of function. []? Progressing: []? Met: []? Not Met: []? Adjusted  2. Patient will demonstrate increased AROM to WNL, good LS mobility, good hip ROM to allow for proper joint functioning as indicated by patients Functional Deficits. []? Progressing: []? Met: []? Not Met: []? Adjusted  3. Patient will demonstrate an increase in Strength to good proximal hip and core activation to allow for proper functional mobility as indicated by patients Functional Deficits. []? Progressing: []? Met: []? Not Met: []? Adjusted  4. Patient will return to walking 1-2 miles without increased symptoms or restriction. []? Progressing: []? Met: []? Not Met: []? Adjusted  5. Patient will be able to garden/do yard work. []? Progressing: []? Met: []? Not Met: []?  Adjusted         ASSESSMENT:  See eval    Treatment/Activity Tolerance:  [x] Patient tolerated treatment well [] Patient limited by fatique  [] Patient limited by pain  [] Patient limited by other medical complications  [] Other:     Overall Progression Towards Functional goals/ Treatment

## 2021-08-13 ENCOUNTER — HOSPITAL ENCOUNTER (OUTPATIENT)
Dept: PHYSICAL THERAPY | Age: 66
Setting detail: THERAPIES SERIES
Discharge: HOME | End: 2021-08-13
Payer: MEDICARE

## 2021-08-13 NOTE — FLOWSHEET NOTE
[facility] [address] 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 Pt cancelled for this Monday as well.      Phone call information:   []  Phone call made today to patient at _ time at number provided:      []  Patient answered, conversation as follows:    []  Patient did not answer, message left as follows:  []  Phone call not made today    Electronically signed by:  Mechelle Sullivan PT

## 2021-08-16 ENCOUNTER — APPOINTMENT (OUTPATIENT)
Dept: PHYSICAL THERAPY | Age: 66
End: 2021-08-16
Payer: MEDICARE

## 2021-08-18 ENCOUNTER — APPOINTMENT (OUTPATIENT)
Dept: PHYSICAL THERAPY | Age: 66
End: 2021-08-18
Payer: MEDICARE

## 2021-08-23 ENCOUNTER — HOSPITAL ENCOUNTER (OUTPATIENT)
Dept: PHYSICAL THERAPY | Age: 66
Setting detail: THERAPIES SERIES
Discharge: HOME | End: 2021-08-23
Payer: MEDICARE

## 2021-08-23 PROCEDURE — 97110 THERAPEUTIC EXERCISES: CPT

## 2021-08-23 PROCEDURE — 97112 NEUROMUSCULAR REEDUCATION: CPT

## 2021-08-23 PROCEDURE — 97140 MANUAL THERAPY 1/> REGIONS: CPT

## 2021-08-23 NOTE — FLOWSHEET NOTE
[facility] [place] [address]. [place] [address]  Phone: [phone]   Fax:     [phone]    Physical Therapy Treatment Note/ Progress Report:     Date:  2021    Patient Name:  Yoni Trevino    :  1955  MRN: [id number]    Pertinent Medical History:      Medical/Treatment Diagnosis Information:  · Diagnosis: S/P lumbar fusion (Z98.1)  · Treatment Diagnosis: Decreased ADL status    Insurance/Certification information:  PT Insurance Information: Medicare  Physician Information:  Referring Practitioner: Dr. Nixon Sosa of care signed (Y/N): N    Date of Patient follow up with Physician:      Progress Report: []  Yes  [x]  No     Date Range for reporting period:  Beginning:   Ending:    Progress report due (10 Rx/or 30 days whichever is less):      Recertification due (POC duration/ or 90 days whichever is less):     Visit # POC/ Insurance Allowable Auth Needed   2 12 []Yes    []No     Pain level:   5-8/10 at B lumbosacral region, R buttock, deep  Functional Scale: 21 Oswestry = 8 sections completed, 13 raw        History of Injury: Patient had lumbar fusion 21- anterior lumbar interbody fusion L2-S1 and posterior laminectomy and fusion L2-S1. Pt stated after surgery she had R leg pain and weakness- \"my leg would collapse. \"  Pt stated she cannot walk long distances anymore, it seems like my hip wants to turn in.\"   Pt stated after walking 10 minutes she has a lot of pain. \"I used to walk miles. I got a little dog that I walk. \"  Pt stated stair climbing \"hurts, but I can do it. \"  Getting out of chairs \"hurts, but I can do it. \"  Pt has a lift chair and no longer uses it. Pt stated she is \"now allowed to bend and lift, what ever feels okay. He lifted the restriction. \" \"I feel like I limp all the time. \"  Pt wakes from rolling in bed.       SUBJECTIVE:  See eval  : Really sore after eval. Has been doing exercises at home and is doing well with those. Still has deep pain in R buttock, but L side definitely feels better    OBJECTIVE:    Observation:     8/6/21  Palpation: TTP R > L lower lumbar paraspinals, R > L buttocks; hypertonic B lumbar paraspinals  Gait: (include devices/WB status) minimal decrease in R weight shift and step length vs L   Bandages/Dressings/Incisions: closed incision sacrum- T12      Test measurements:      ROM   Comments   Lumbar Flex WFL, minimal LBP     Lumbar Ext WFL        ROM LEFT RIGHT Comments   Piriformis tight tight        Myotomes/Strength Normal Abnormal Comments   [x]? ALL NORMAL         Hip Abd     R 4/5   Hip ADD     R 4+/5   Hip Ext     R 4-/5, pain   Hip flexion (L1-L2 femoral) []?  [x]?  R 4/5   Knee extension (L2-L4 femoral) []?  [x]?  R 4+/5   Multifidus []?  [x]?       Transverse Ab []?  [x]?             RESTRICTIONS/PRECAUTIONS: back surgery    Exercises/Interventions:     Therapeutic Ex (14327)   Min: 15 Repetitions/Resistance Notes/Cues   Nu Step 5 min #5   Stretch  Hamstrings  Gastroc   2x30\"  2x30\"    Standing hip  ABD  Ext   1x10 R/L  1x10 R/L                                  Manual Intervention (60097) Min: 15 min     STM R lumbar 8 min    STM R piriformis 7 min    NMR re-education (44447)   Min: 15     PPT  Bridge  Fall outs  Fall outs with t-band 10 x 5\"  x10  x10 R/L    Bridge with add set  Bridge with ABD vs t-band 10 x 5\"    Hook lying march          Therapeutic Activity (91289) Min:               Modalities  Min:             Other Therapeutic Activities:  Pt was educated on PT POC, Diagnosis, Prognosis, pathomechanics as well as frequency and duration of scheduling future physical therapy appointments. Time was also taken on this day to answer all patient questions and participation in PT. Reviewed appointment policy in detail with patient and patient verbalized understanding.      Home Exercise Program:[id number] add set, pelvic tilt, fall out, bridge, piriformis stretch       Therapeutic Exercise and NMR EXR  [] (11028) Provided verbal/tactile cueing for activities related to strengthening, flexibility, endurance, ROM  for improvements in proximal hip and core control with self care, mobility, lifting and ambulation.  [] (66292) Provided verbal/tactile cueing for activities related to improving balance, coordination, kinesthetic sense, posture, motor skill, proprioception  to assist with core control in self care, mobility, lifting, and ambulation. Therapeutic Activities:    [] (57638 or 86791) Provided verbal/tactile cueing for activities related to improving balance, coordination, kinesthetic sense, posture, motor skill, proprioception and motor activation to allow for proper function  with self care and ADLs  [] (33199) Provided training and instruction to the patient for proper core and proximal hip recruitment and positioning with ambulation re-education     Home Exercise Program:    [] (40088) Reviewed/Progressed HEP activities related to strengthening, flexibility, endurance, ROM of core, proximal hip and LE for functional self-care, mobility, lifting and ambulation   [] (99090) Reviewed/Progressed HEP activities related to improving balance, coordination, kinesthetic sense, posture, motor skill, proprioception of core, proximal hip and LE for self care, mobility, lifting, and ambulation      Manual Treatments:  PROM / STM / Oscillations-Mobs:  G-I, II, III, IV (PA's, Inf., Post.)  [] (39165) Provided manual therapy to mobilize proximal hip and LS spine soft tissue/joints for the purpose of modulating pain, promoting relaxation,  increasing ROM, reducing/eliminating soft tissue swelling/inflammation/restriction, improving soft tissue extensibility and allowing for proper ROM for normal function with self care, mobility, lifting and ambulation.        Charges:  Timed Code Treatment Minutes: 45   Total Treatment Minutes: 45     [] KEREN (LOW) 35030 (typically 20 minutes face-to-face)  [] EVAL (MOD) 94413 (typically 30 minutes face-to-face)  [] EVAL (HIGH) 75442 (typically 45 minutes face-to-face)  [] RE-EVAL     [x] VN(69206) x     [] Dry needle 1 or 2 Muscles (88578)  [x] NMR (15741) x     [] Dry needle 3+ Muscles (54721)  [x] Manual (02630) x     [] Ultrasound (79795) x  [] TA (65864) x     [] Mech Traction (06104)  [] ES(attended) (69433)     [] ES (un) (46499):   [] Vasopump (34857) [] Ionto (28344)   [] Other:    GOALS:  Patient stated goal: \"get rid of pain\"  []? Progressing: []? Met: []? Not Met: []? Adjusted  Therapist goals for Patient:   Short Term Goals: To be achieved in: 2 weeks  1. Independent in HEP and progression per patient tolerance, in order to prevent re-injury. []? Progressing: []? Met: []? Not Met: []? Adjusted  2. Patient will have a decrease in pain to facilitate improvement in movement, function, and ADLs as indicated by Functional Deficits. []? Progressing: []? Met: []? Not Met: []? Adjusted     Long Term Goals: To be achieved in: 6 weeks  1. Disability index score of 7% or less for the CLAUDINE to assist with reaching prior level of function. []? Progressing: []? Met: []? Not Met: []? Adjusted  2. Patient will demonstrate increased AROM to WNL, good LS mobility, good hip ROM to allow for proper joint functioning as indicated by patients Functional Deficits. []? Progressing: []? Met: []? Not Met: []? Adjusted  3. Patient will demonstrate an increase in Strength to good proximal hip and core activation to allow for proper functional mobility as indicated by patients Functional Deficits. []? Progressing: []? Met: []? Not Met: []? Adjusted  4. Patient will return to walking 1-2 miles without increased symptoms or restriction. []? Progressing: []? Met: []? Not Met: []? Adjusted  5. Patient will be able to garden/do yard work. []? Progressing: []? Met: []? Not Met: []?  Adjusted         ASSESSMENT:  See eval    Treatment/Activity Tolerance:  [x] Patient tolerated treatment well [] Patient limited by fatique  [] Patient limited by pain  [] Patient limited by other medical complications  [] Other:     Overall Progression Towards Functional goals/ Treatment Progress Update:  [] Patient is progressing as expected towards functional goals listed. [] Progression is slowed due to complexities/Impairments listed. [] Progression has been slowed due to co-morbidities. [x] Plan just implemented, too soon to assess goals progression <30days   [] Goals require adjustment due to lack of progress  [] Patient is not progressing as expected and requires additional follow up with physician  [] Other:    Prognosis for POC: [x] Good [] Fair  [] Poor    Patient requires continued skilled intervention: [x] Yes  [] No        PLAN: See eval  [x] Continue per plan of care [] Alter current plan (see comments)  [] Plan of care initiated [] Hold pending MD visit [] Discharge    Electronically signed by: Shaneka Mackenzie PTA    Note: If patient does not return for scheduled/recommended follow up visits, this note will serve as a discharge from care along with the most recent update on progress.

## 2021-08-25 ENCOUNTER — APPOINTMENT (OUTPATIENT)
Dept: PHYSICAL THERAPY | Age: 66
End: 2021-08-25
Payer: MEDICARE

## 2021-08-27 ENCOUNTER — HOSPITAL ENCOUNTER (OUTPATIENT)
Dept: PHYSICAL THERAPY | Age: 66
Setting detail: THERAPIES SERIES
End: 2021-08-27
Payer: MEDICARE

## 2021-08-30 ENCOUNTER — APPOINTMENT (OUTPATIENT)
Dept: PHYSICAL THERAPY | Age: 66
End: 2021-08-30
Payer: MEDICARE

## 2021-09-01 ENCOUNTER — APPOINTMENT (OUTPATIENT)
Dept: PHYSICAL THERAPY | Age: 66
End: 2021-09-01
Payer: MEDICARE

## 2021-09-03 ENCOUNTER — HOSPITAL ENCOUNTER (OUTPATIENT)
Dept: PHYSICAL THERAPY | Age: 66
Setting detail: THERAPIES SERIES
End: 2021-09-03
Payer: MEDICARE

## 2021-09-08 ENCOUNTER — APPOINTMENT (OUTPATIENT)
Dept: PHYSICAL THERAPY | Age: 66
End: 2021-09-08
Payer: MEDICARE

## 2021-09-08 ENCOUNTER — HOSPITAL ENCOUNTER (OUTPATIENT)
Dept: PHYSICAL THERAPY | Age: 66
Setting detail: THERAPIES SERIES
Discharge: HOME | End: 2021-09-08
Payer: MEDICARE

## 2021-09-08 PROCEDURE — 97140 MANUAL THERAPY 1/> REGIONS: CPT

## 2021-09-08 PROCEDURE — 97110 THERAPEUTIC EXERCISES: CPT

## 2021-09-08 PROCEDURE — 97112 NEUROMUSCULAR REEDUCATION: CPT

## 2021-09-08 NOTE — FLOWSHEET NOTE
[facility] [place] [address]. [place] [address]  Phone: [phone]   Fax:     [phone]    Physical Therapy Treatment Note/ Progress Report:     Date:  2021    Patient Name:  Trevon Nguyen    :  1955  MRN: [id number]    Pertinent Medical History:      Medical/Treatment Diagnosis Information:  · Diagnosis: S/P lumbar fusion (Z98.1)  · Treatment Diagnosis: Decreased ADL status    Insurance/Certification information:  PT Insurance Information: Medicare  Physician Information:  Referring Practitioner: Dr. Bridgette Ayala of care signed (Y/N): Y    Date of Patient follow up with Physician:      Progress Report: []  Yes  [x]  No     Date Range for reporting period:  Beginning:   Ending:    Progress report due (10 Rx/or 30 days whichever is less):      Recertification due (POC duration/ or 90 days whichever is less):     Visit # POC/ Insurance Allowable Auth Needed   4 12 []Yes    []No     Pain level:   5-8/10 at B lumbosacral region, R buttock, deep  Functional Scale: 21 Oswestry = 8 sections completed, 13 raw        History of Injury: Patient had lumbar fusion 21- anterior lumbar interbody fusion L2-S1 and posterior laminectomy and fusion L2-S1. Pt stated after surgery she had R leg pain and weakness- \"my leg would collapse. \"  Pt stated she cannot walk long distances anymore, it seems like my hip wants to turn in.\"   Pt stated after walking 10 minutes she has a lot of pain. \"I used to walk miles. I got a little dog that I walk. \"  Pt stated stair climbing \"hurts, but I can do it. \"  Getting out of chairs \"hurts, but I can do it. \"  Pt has a lift chair and no longer uses it. Pt stated she is \"now allowed to bend and lift, what ever feels okay. He lifted the restriction. \" \"I feel like I limp all the time. \"  Pt wakes from rolling in bed.       SUBJECTIVE:  See eval  : Really sore after moshe. Has been doing exercises at home and is doing well with those. Still has deep pain in R buttock, but L side definitely feels better  9/8/21 pain \"comes and goes\" but used to be constant. Pt stated her R leg is getting stronger and she is starting to walk farther distances, and can walk 2 miles at a time. OBJECTIVE:    Observation:     8/6/21  Palpation: TTP R > L lower lumbar paraspinals, R > L buttocks; hypertonic B lumbar paraspinals  Gait: (include devices/WB status) minimal decrease in R weight shift and step length vs L   Bandages/Dressings/Incisions: closed incision sacrum- T12      Test measurements:      ROM 8/6/21    Lumbar Flex WFL, minimal LBP   Lumbar Ext WFL      ROM LEFT  8/6/21 RIGHT  8/6/21   Piriformis tight tight      Myotomes/Strength   8/6/21 9/8/21   [x]? ALL NORMAL      Hip Abd R 4/5 R 4/5   Hip ADD R 4+/5 R 4+/5   Hip Ext R 4-/5, pain R 4/5, pain   Hip flexion (L1-L2 femoral) R 4/5 R 5/5   Knee extension (L2-L4 femoral) R 4+/5 R 4+/5   Multifidus  poor contraction fair   Transverse Ab  poor contraction good         RESTRICTIONS/PRECAUTIONS: back surgery    Exercises/Interventions:     Therapeutic Ex (09165)   Min: 15 Repetitions/Resistance Notes/Cues   Nu Step 5 min #5   Stretch  Hamstrings  Gastroc   2x30\"  2x30\"    Standing hip  ABD  Ext   1x10 R/L  1x10 R/L    Prone LE raise 10x, 3\" hold, L/R Pillow under abdomen                            Manual Intervention (97213) Min: 10 min     STM L lumbar check    STM R piriformis check    NMR re-education (43023)   Min: 21     Fall outs with t-band x20 R/L    Bridge with add set  Bridge with ABD vs t-band 10 x 5\"    Hook lying march 15x         DENYS  Knee flexion  Knee extension     Therapeutic Activity (14750) Min:               Modalities  Min:             Other Therapeutic Activities:  Pt was educated on PT POC, Diagnosis, Prognosis, pathomechanics as well as frequency and duration of scheduling future physical therapy appointments. Time was also taken on this day to answer all patient questions and participation in PT. Reviewed appointment policy in detail with patient and patient verbalized understanding. Home Exercise Program:[id number] add set, pelvic tilt, fall out, bridge, piriformis stretch  9/8/21 hook lying march, theraband hip ER in hook lying       Therapeutic Exercise and NMR EXR  [] (24335) Provided verbal/tactile cueing for activities related to strengthening, flexibility, endurance, ROM  for improvements in proximal hip and core control with self care, mobility, lifting and ambulation.  [] (72637) Provided verbal/tactile cueing for activities related to improving balance, coordination, kinesthetic sense, posture, motor skill, proprioception  to assist with core control in self care, mobility, lifting, and ambulation.      Therapeutic Activities:    [] (64233 or 45038) Provided verbal/tactile cueing for activities related to improving balance, coordination, kinesthetic sense, posture, motor skill, proprioception and motor activation to allow for proper function  with self care and ADLs  [] (58098) Provided training and instruction to the patient for proper core and proximal hip recruitment and positioning with ambulation re-education     Home Exercise Program:    [] (83860) Reviewed/Progressed HEP activities related to strengthening, flexibility, endurance, ROM of core, proximal hip and LE for functional self-care, mobility, lifting and ambulation   [] (18331) Reviewed/Progressed HEP activities related to improving balance, coordination, kinesthetic sense, posture, motor skill, proprioception of core, proximal hip and LE for self care, mobility, lifting, and ambulation      Manual Treatments:  PROM / STM / Oscillations-Mobs:  G-I, II, III, IV (PA's, Inf., Post.)  [] (81613) Provided manual therapy to mobilize proximal hip and LS spine soft tissue/joints for the purpose of modulating pain, promoting relaxation,  increasing ROM, reducing/eliminating soft tissue swelling/inflammation/restriction, improving soft tissue extensibility and allowing for proper ROM for normal function with self care, mobility, lifting and ambulation. Charges:  Timed Code Treatment Minutes: 45   Total Treatment Minutes: 45     [] EVAL (LOW) 50194 (typically 20 minutes face-to-face)  [] EVAL (MOD) 18535 (typically 30 minutes face-to-face)  [] EVAL (HIGH) 86918 (typically 45 minutes face-to-face)  [] RE-EVAL     [x] NK(01200) x     [] Dry needle 1 or 2 Muscles (74223)  [x] NMR (98347) x     [] Dry needle 3+ Muscles (47638)  [x] Manual (17946) x     [] Ultrasound (83699) x  [] TA (42923) x     [] Mech Traction (41035)  [] ES(attended) (24400)     [] ES (un) (79779):   [] Vasopump (08766) [] Ionto (63961)   [] Other:    GOALS:  Patient stated goal: \"get rid of pain\"  []? Progressing: []? Met: []? Not Met: []? Adjusted  Therapist goals for Patient:   Short Term Goals: To be achieved in: 2 weeks  1. Independent in HEP and progression per patient tolerance, in order to prevent re-injury. []? Progressing: []? Met: []? Not Met: []? Adjusted  2. Patient will have a decrease in pain to facilitate improvement in movement, function, and ADLs as indicated by Functional Deficits. []? Progressing: []? Met: []? Not Met: []? Adjusted     Long Term Goals: To be achieved in: 6 weeks  1. Disability index score of 7% or less for the CLAUDINE to assist with reaching prior level of function. []? Progressing: []? Met: []? Not Met: []? Adjusted  2. Patient will demonstrate increased AROM to WNL, good LS mobility, good hip ROM to allow for proper joint functioning as indicated by patients Functional Deficits. []? Progressing: []? Met: []? Not Met: []? Adjusted  3. Patient will demonstrate an increase in Strength to good proximal hip and core activation to allow for proper functional mobility as indicated by patients Functional Deficits. []? Progressing: []? Met: []?  Not Met: []? Adjusted  4. Patient will return to walking 1-2 miles without increased symptoms or restriction. []? Progressing: []? Met: []? Not Met: []? Adjusted  5. Patient will be able to garden/do yard work. []? Progressing: []? Met: []? Not Met: []? Adjusted         ASSESSMENT:  See eval    Treatment/Activity Tolerance:  [x] Patient tolerated treatment well [] Patient limited by fatique  [] Patient limited by pain  [] Patient limited by other medical complications  [] Other:     Overall Progression Towards Functional goals/ Treatment Progress Update:  [] Patient is progressing as expected towards functional goals listed. [] Progression is slowed due to complexities/Impairments listed. [] Progression has been slowed due to co-morbidities. [x] Plan just implemented, too soon to assess goals progression <30days   [] Goals require adjustment due to lack of progress  [] Patient is not progressing as expected and requires additional follow up with physician  [] Other:    Prognosis for POC: [x] Good [] Fair  [] Poor    Patient requires continued skilled intervention: [x] Yes  [] No        PLAN: See eval  [x] Continue per plan of care [] Alter current plan (see comments)  [] Plan of care initiated [] Hold pending MD visit [] Discharge    Electronically signed by: Shaneka Whitney PT    Note: If patient does not return for scheduled/recommended follow up visits, this note will serve as a discharge from care along with the most recent update on progress.

## 2021-09-10 ENCOUNTER — HOSPITAL ENCOUNTER (OUTPATIENT)
Dept: PHYSICAL THERAPY | Age: 66
Setting detail: THERAPIES SERIES
Discharge: HOME | End: 2021-09-10
Payer: MEDICARE

## 2021-09-10 PROCEDURE — 97140 MANUAL THERAPY 1/> REGIONS: CPT

## 2021-09-10 PROCEDURE — 97112 NEUROMUSCULAR REEDUCATION: CPT

## 2021-09-10 PROCEDURE — 97110 THERAPEUTIC EXERCISES: CPT

## 2021-09-10 NOTE — FLOWSHEET NOTE
[place] [facility] [address] [place] [address]  Phone: [phone]   Fax:     [phone]    Physical Therapy Treatment Note/ Progress Report:     Date:  9/10/2021    Patient Name:  Ellis Sawant    :  1955  MRN: [id number]    Pertinent Medical History:      Medical/Treatment Diagnosis Information:  · Diagnosis: S/P lumbar fusion (Z98.1)  · Treatment Diagnosis: Decreased ADL status    Insurance/Certification information:  PT Insurance Information: Medicare  Physician Information:  Referring Practitioner: Dr. Laz Lange of care signed (Y/N): Y    Date of Patient follow up with Physician:      Progress Report: []  Yes  [x]  No     Date Range for reporting period:  Beginning:   Ending:    Progress report due (10 Rx/or 30 days whichever is less):      Recertification due (POC duration/ or 90 days whichever is less):     Visit # POC/ Insurance Allowable Auth Needed   5 12 []Yes    []No     Pain level:   5-8/10 at B lumbosacral region, R buttock, deep  Functional Scale: 21 Oswestry = 8 sections completed, 13 raw        History of Injury: Patient had lumbar fusion 21- anterior lumbar interbody fusion L2-S1 and posterior laminectomy and fusion L2-S1. Pt stated after surgery she had R leg pain and weakness- \"my leg would collapse. \"  Pt stated she cannot walk long distances anymore, it seems like my hip wants to turn in.\"   Pt stated after walking 10 minutes she has a lot of pain. \"I used to walk miles. I got a little dog that I walk. \"  Pt stated stair climbing \"hurts, but I can do it. \"  Getting out of chairs \"hurts, but I can do it. \"  Pt has a lift chair and no longer uses it. Pt stated she is \"now allowed to bend and lift, what ever feels okay. He lifted the restriction. \" \"I feel like I limp all the time. \"  Pt wakes from rolling in bed.       SUBJECTIVE:  See eval  : Really sore after eval. Has been doing exercises at home and is doing well with those. Still has deep pain in R buttock, but L side definitely feels better  9/8/21 pain \"comes and goes\" but used to be constant. Pt stated her R leg is getting stronger and she is starting to walk farther distances, and can walk 2 miles at a time. OBJECTIVE:    Observation:     8/6/21  Palpation: TTP R > L lower lumbar paraspinals, R > L buttocks; hypertonic B lumbar paraspinals  Gait: (include devices/WB status) minimal decrease in R weight shift and step length vs L   Bandages/Dressings/Incisions: closed incision sacrum- T12      Test measurements:      ROM 8/6/21    Lumbar Flex WFL, minimal LBP   Lumbar Ext WFL      ROM LEFT  8/6/21 RIGHT  8/6/21   Piriformis tight tight      Myotomes/Strength   8/6/21 9/8/21   [x]? ALL NORMAL      Hip Abd R 4/5 R 4/5   Hip ADD R 4+/5 R 4+/5   Hip Ext R 4-/5, pain R 4/5, pain   Hip flexion (L1-L2 femoral) R 4/5 R 5/5   Knee extension (L2-L4 femoral) R 4+/5 R 4+/5   Multifidus  poor contraction fair   Transverse Ab  poor contraction good         RESTRICTIONS/PRECAUTIONS: back surgery    Exercises/Interventions:     Therapeutic Ex (11430)   Min: 15 Repetitions/Resistance Notes/Cues   Nu Step 5 min #5   Stretch  Hamstrings  Gastroc   2x30\"  2x30\"    Standing hip t-slide  ABD  Ext    Green 2x10 R/L  Green 2x10 R/L    Prone LE raise 10x, 3\" hold, L/R Pillow under abdomen                            Manual Intervention (47746) Min: 15 min     STM L piriformis check    STM R piriformis check    NMR re-education (03189)   Min: 15     Fall outs with t-band x20 R/L    Bridge with add set  Bridge with ABD vs t-band blue 10 x 5\"    Hook lying march 15x         DENYS  Knee flexion  Knee extension Settings 8 and 9, 10x each    Therapeutic Activity (85930) Min:               Modalities  Min:             Other Therapeutic Activities:  Pt was educated on PT POC, Diagnosis, Prognosis, pathomechanics as well as frequency and duration of scheduling future physical therapy appointments. Time was also taken on this day to answer all patient questions and participation in PT. Reviewed appointment policy in detail with patient and patient verbalized understanding. Home Exercise Program:[id number] add set, pelvic tilt, fall out, bridge, piriformis stretch  9/8/21 hook lying march, theraband hip ER in hook lying       Therapeutic Exercise and NMR EXR  [] (78885) Provided verbal/tactile cueing for activities related to strengthening, flexibility, endurance, ROM  for improvements in proximal hip and core control with self care, mobility, lifting and ambulation.  [] (41647) Provided verbal/tactile cueing for activities related to improving balance, coordination, kinesthetic sense, posture, motor skill, proprioception  to assist with core control in self care, mobility, lifting, and ambulation.      Therapeutic Activities:    [] (66632 or 68408) Provided verbal/tactile cueing for activities related to improving balance, coordination, kinesthetic sense, posture, motor skill, proprioception and motor activation to allow for proper function  with self care and ADLs  [] (20895) Provided training and instruction to the patient for proper core and proximal hip recruitment and positioning with ambulation re-education     Home Exercise Program:    [] (96342) Reviewed/Progressed HEP activities related to strengthening, flexibility, endurance, ROM of core, proximal hip and LE for functional self-care, mobility, lifting and ambulation   [] (99170) Reviewed/Progressed HEP activities related to improving balance, coordination, kinesthetic sense, posture, motor skill, proprioception of core, proximal hip and LE for self care, mobility, lifting, and ambulation      Manual Treatments:  PROM / STM / Oscillations-Mobs:  G-I, II, III, IV (PA's, Inf., Post.)  [] (86050) Provided manual therapy to mobilize proximal hip and LS spine soft tissue/joints for the purpose of modulating pain, promoting relaxation,  increasing ROM, reducing/eliminating soft tissue swelling/inflammation/restriction, improving soft tissue extensibility and allowing for proper ROM for normal function with self care, mobility, lifting and ambulation. Charges:  Timed Code Treatment Minutes: 45   Total Treatment Minutes: 45     [] EVAL (LOW) 65249 (typically 20 minutes face-to-face)  [] EVAL (MOD) 69153 (typically 30 minutes face-to-face)  [] EVAL (HIGH) 96096 (typically 45 minutes face-to-face)  [] RE-EVAL     [x] NR(21638) x     [] Dry needle 1 or 2 Muscles (15456)  [x] NMR (58150) x     [] Dry needle 3+ Muscles (42873)  [x] Manual (21207) x     [] Ultrasound (95238) x  [] TA (44796) x     [] Mech Traction (72761)  [] ES(attended) (99587)     [] ES (un) (36860):   [] Vasopump (31206) [] Ionto (96148)   [] Other:    GOALS:  Patient stated goal: \"get rid of pain\"  []? Progressing: []? Met: []? Not Met: []? Adjusted  Therapist goals for Patient:   Short Term Goals: To be achieved in: 2 weeks  1. Independent in HEP and progression per patient tolerance, in order to prevent re-injury. []? Progressing: []? Met: []? Not Met: []? Adjusted  2. Patient will have a decrease in pain to facilitate improvement in movement, function, and ADLs as indicated by Functional Deficits. []? Progressing: []? Met: []? Not Met: []? Adjusted     Long Term Goals: To be achieved in: 6 weeks  1. Disability index score of 7% or less for the CLAUDINE to assist with reaching prior level of function. []? Progressing: []? Met: []? Not Met: []? Adjusted  2. Patient will demonstrate increased AROM to WNL, good LS mobility, good hip ROM to allow for proper joint functioning as indicated by patients Functional Deficits. []? Progressing: []? Met: []? Not Met: []? Adjusted  3.  Patient will demonstrate an increase in Strength to good proximal hip and core activation to allow for proper functional mobility as indicated by patients

## 2021-09-13 ENCOUNTER — HOSPITAL ENCOUNTER (OUTPATIENT)
Dept: PHYSICAL THERAPY | Age: 66
Setting detail: THERAPIES SERIES
Discharge: HOME | End: 2021-09-13
Payer: MEDICARE

## 2021-09-13 PROCEDURE — 97112 NEUROMUSCULAR REEDUCATION: CPT

## 2021-09-13 PROCEDURE — 97110 THERAPEUTIC EXERCISES: CPT

## 2021-09-13 PROCEDURE — 97140 MANUAL THERAPY 1/> REGIONS: CPT

## 2021-09-13 NOTE — FLOWSHEET NOTE
[place] [facility] [place] [address] [place] [address]  Phone: [phone]   Fax:     [phone]    Physical Therapy Treatment Note/ Progress Report:     Date:  2021    Patient Name:  Alonza Cushing    :  1955  MRN: [id number]    Pertinent Medical History:      Medical/Treatment Diagnosis Information:  · Diagnosis: S/P lumbar fusion (Z98.1)  · Treatment Diagnosis: Decreased ADL status    Insurance/Certification information:  PT Insurance Information: Medicare  Physician Information:  Referring Practitioner: Dr. Lynette Diaz of care signed (Y/N): Y    Date of Patient follow up with Physician:      Progress Report: []  Yes  [x]  No     Date Range for reporting period:  Beginning:   Ending:    Progress report due (10 Rx/or 30 days whichever is less):      Recertification due (POC duration/ or 90 days whichever is less):     Visit # POC/ Insurance Allowable Auth Needed   6 12 []Yes    []No     Pain level:   5-8/10 at B lumbosacral region, R buttock, deep  Functional Scale: 21 Oswestry = 8 sections completed, 13 raw        History of Injury: Patient had lumbar fusion 21- anterior lumbar interbody fusion L2-S1 and posterior laminectomy and fusion L2-S1. Pt stated after surgery she had R leg pain and weakness- \"my leg would collapse. \"  Pt stated she cannot walk long distances anymore, it seems like my hip wants to turn in.\"   Pt stated after walking 10 minutes she has a lot of pain. \"I used to walk miles. I got a little dog that I walk. \"  Pt stated stair climbing \"hurts, but I can do it. \"  Getting out of chairs \"hurts, but I can do it. \"  Pt has a lift chair and no longer uses it. Pt stated she is \"now allowed to bend and lift, what ever feels okay. He lifted the restriction. \" \"I feel like I limp all the time. \"  Pt wakes from rolling in bed.       SUBJECTIVE:  See moshe  : Really sore after eval. Has been doing exercises at home and is doing well with those. Still has deep pain in R buttock, but L side definitely feels better  9/8/21 pain \"comes and goes\" but used to be constant. Pt stated her R leg is getting stronger and she is starting to walk farther distances, and can walk 2 miles at a time. 9/13: Saturday night must have done something in her sleep or when she got up to go to the bathroom, but pain has been bad. Has been taking a bunch of advil. OBJECTIVE:    Observation:     8/6/21  Palpation: TTP R > L lower lumbar paraspinals, R > L buttocks; hypertonic B lumbar paraspinals  Gait: (include devices/WB status) minimal decrease in R weight shift and step length vs L   Bandages/Dressings/Incisions: closed incision sacrum- T12      Test measurements:      ROM 8/6/21    Lumbar Flex WFL, minimal LBP   Lumbar Ext WFL      ROM LEFT  8/6/21 RIGHT  8/6/21   Piriformis tight tight      Myotomes/Strength   8/6/21 9/8/21   [x]? ALL NORMAL      Hip Abd R 4/5 R 4/5   Hip ADD R 4+/5 R 4+/5   Hip Ext R 4-/5, pain R 4/5, pain   Hip flexion (L1-L2 femoral) R 4/5 R 5/5   Knee extension (L2-L4 femoral) R 4+/5 R 4+/5   Multifidus  poor contraction fair   Transverse Ab  poor contraction good         RESTRICTIONS/PRECAUTIONS: back surgery    Exercises/Interventions:     Therapeutic Ex (04885)   Min: 15 Repetitions/Resistance Notes/Cues   Nu Step 5 min #5   Stretch  Hamstrings  Gastroc   2x30\"  2x30\"    Standing hip t-slide  ABD  Ext    Green 2x10 R/L  Green 2x10 R/L    Prone LE raise 10x, 3\" hold, L/R Pillow under abdomen                            Manual Intervention (39576) Min: 15 min     STM L piriformis check    STM R piriformis check    NMR re-education (68000)   Min: 15     Fall outs with t-band x20 R/L    Bridge with add set  Bridge with ABD vs t-band blue 10 x 5\"  10 x 5\"    Hook lying march 15x         DENYS  Knee flexion  Knee extension Settings 8 and 9, 10x each    Therapeutic Activity (48179) Min:               Modalities  Min:             Other Therapeutic Activities:  Pt was educated on PT POC, Diagnosis, Prognosis, pathomechanics as well as frequency and duration of scheduling future physical therapy appointments. Time was also taken on this day to answer all patient questions and participation in PT. Reviewed appointment policy in detail with patient and patient verbalized understanding. Home Exercise Program:[id number] add set, pelvic tilt, fall out, bridge, piriformis stretch  9/8/21 hook lying march, theraband hip ER in hook lying       Therapeutic Exercise and NMR EXR  [] (19800) Provided verbal/tactile cueing for activities related to strengthening, flexibility, endurance, ROM  for improvements in proximal hip and core control with self care, mobility, lifting and ambulation.  [] (13528) Provided verbal/tactile cueing for activities related to improving balance, coordination, kinesthetic sense, posture, motor skill, proprioception  to assist with core control in self care, mobility, lifting, and ambulation.      Therapeutic Activities:    [] (70560 or 13396) Provided verbal/tactile cueing for activities related to improving balance, coordination, kinesthetic sense, posture, motor skill, proprioception and motor activation to allow for proper function  with self care and ADLs  [] (93139) Provided training and instruction to the patient for proper core and proximal hip recruitment and positioning with ambulation re-education     Home Exercise Program:    [] (24094) Reviewed/Progressed HEP activities related to strengthening, flexibility, endurance, ROM of core, proximal hip and LE for functional self-care, mobility, lifting and ambulation   [] (75195) Reviewed/Progressed HEP activities related to improving balance, coordination, kinesthetic sense, posture, motor skill, proprioception of core, proximal hip and LE for self care, mobility, lifting, and ambulation      Manual Treatments: PROM / STM / Oscillations-Mobs:  G-I, II, III, IV (PA's, Inf., Post.)  [] (22457) Provided manual therapy to mobilize proximal hip and LS spine soft tissue/joints for the purpose of modulating pain, promoting relaxation,  increasing ROM, reducing/eliminating soft tissue swelling/inflammation/restriction, improving soft tissue extensibility and allowing for proper ROM for normal function with self care, mobility, lifting and ambulation. Charges:  Timed Code Treatment Minutes: 45   Total Treatment Minutes: 45     [] EVAL (LOW) 87951 (typically 20 minutes face-to-face)  [] EVAL (MOD) 74413 (typically 30 minutes face-to-face)  [] EVAL (HIGH) 44763 (typically 45 minutes face-to-face)  [] RE-EVAL     [x] RI(98708) x     [] Dry needle 1 or 2 Muscles (32128)  [x] NMR (30131) x     [] Dry needle 3+ Muscles (38108)  [x] Manual (97456) x     [] Ultrasound (98583) x  [] TA (36007) x     [] Mech Traction (64078)  [] ES(attended) (34216)     [] ES (un) (06918):   [] Vasopump (87281) [] Ionto (32991)   [] Other:    GOALS:  Patient stated goal: \"get rid of pain\"  []? Progressing: []? Met: []? Not Met: []? Adjusted  Therapist goals for Patient:   Short Term Goals: To be achieved in: 2 weeks  1. Independent in HEP and progression per patient tolerance, in order to prevent re-injury. []? Progressing: []? Met: []? Not Met: []? Adjusted  2. Patient will have a decrease in pain to facilitate improvement in movement, function, and ADLs as indicated by Functional Deficits. []? Progressing: []? Met: []? Not Met: []? Adjusted     Long Term Goals: To be achieved in: 6 weeks  1. Disability index score of 7% or less for the CLAUDINE to assist with reaching prior level of function. []? Progressing: []? Met: []? Not Met: []? Adjusted  2. Patient will demonstrate increased AROM to WNL, good LS mobility, good hip ROM to allow for proper joint functioning as indicated by patients Functional Deficits. []? Progressing: []? Met: []?  Not Met: []? Adjusted  3. Patient will demonstrate an increase in Strength to good proximal hip and core activation to allow for proper functional mobility as indicated by patients Functional Deficits. []? Progressing: []? Met: []? Not Met: []? Adjusted  4. Patient will return to walking 1-2 miles without increased symptoms or restriction. []? Progressing: []? Met: []? Not Met: []? Adjusted  5. Patient will be able to garden/do yard work. []? Progressing: []? Met: []? Not Met: []? Adjusted         ASSESSMENT:  See eval    Treatment/Activity Tolerance:  [x] Patient tolerated treatment well [] Patient limited by fatique  [] Patient limited by pain  [] Patient limited by other medical complications  [] Other:     Overall Progression Towards Functional goals/ Treatment Progress Update:  [] Patient is progressing as expected towards functional goals listed. [] Progression is slowed due to complexities/Impairments listed. [] Progression has been slowed due to co-morbidities. [x] Plan just implemented, too soon to assess goals progression <30days   [] Goals require adjustment due to lack of progress  [] Patient is not progressing as expected and requires additional follow up with physician  [] Other:    Prognosis for POC: [x] Good [] Fair  [] Poor    Patient requires continued skilled intervention: [x] Yes  [] No        PLAN: See eval  [x] Continue per plan of care [] Alter current plan (see comments)  [] Plan of care initiated [] Hold pending MD visit [] Discharge    Electronically signed by: Mario Stauffer PTA    Note: If patient does not return for scheduled/recommended follow up visits, this note will serve as a discharge from care along with the most recent update on progress.

## 2021-09-15 ENCOUNTER — APPOINTMENT (OUTPATIENT)
Dept: PHYSICAL THERAPY | Age: 66
End: 2021-09-15
Payer: MEDICARE

## 2021-09-16 NOTE — FLOWSHEET NOTE
[facility] [place] [facility] [address] [place] [address]  Phone: [phone]   Fax:     [phone]     Physical Therapy  Cancellation/No-show Note  Patient Name:  Alonza Cushing  :  1955   Date:  2021  Cancelled visits to date: 0  No-shows to date: 0    Patient status for today's appointment patient:  [x]  Cancelled  []  Rescheduled appointment  []  No-show     Reason given by patient:  []  Patient ill  []  Conflicting appointment  []  No transportation    []  Conflict with work  []  No reason given  []  Other:     Comments:  Pt called PT department today to cancel her PT session for tomorrow, as she is having pain from rolling in bed. PT called pt- no answer. PT was unable to leave a message as her mailbox was full.       Electronically signed by:  Fito Camargo, PT

## 2021-09-17 ENCOUNTER — APPOINTMENT (OUTPATIENT)
Dept: PHYSICAL THERAPY | Age: 66
End: 2021-09-17
Payer: MEDICARE

## 2021-10-20 ENCOUNTER — HOSPITAL ENCOUNTER (OUTPATIENT)
Dept: WOMENS IMAGING | Age: 66
Discharge: HOME | End: 2021-10-20
Payer: MEDICARE

## 2021-10-20 DIAGNOSIS — Z12.31 VISIT FOR SCREENING MAMMOGRAM: ICD-10-CM

## 2021-10-20 PROCEDURE — 77063 BREAST TOMOSYNTHESIS BI: CPT

## 2022-07-20 NOTE — PROGRESS NOTES
Chief complaint   Low back pain    Shane Bales is 5 months s/p anterior-posterior fusion L2-3, L3-4, L5-S1 degenerative scoliosis, adjacent level disc disease and right greater than left leg pain. She notes creasing pain over the right lateral hip with radiation into the right thigh to her knee. Patient states that she was recently in [facility] and between the walking and the plane flight this is exacerbated her pain. She denies any progressive weakness, or change in her bowel or bladder function. Pain Assessment  Location of Pain: Buttocks  Severity of Pain: 3  Quality of Pain: Other (Comment)  Exam:  General Exam:  Pt is alert and oriented x 3 and in no acute distress. Gait is heel toe with no limp or instability pain freely with a wheeled walker. Mood and affect are appropriate. Back:  No deformity. Healing surgical scars anteriorly and posteriorly. No rash. positive for back pain on flexion. Mild right leg weakness with weightbearing  reduced ROM overall.   pain on palpation over the right greater than left trochanteric bursa    Lower Extremities:   She has -5/5 strength of EHLs, FHLs, foot evertors, ankle dorsiflexors, plantarflexors, quadriceps, hamstrings, iliopsoas, abductors and adductors about the hips on the right and 4+ on the left. She has a negative straight leg raise, bilaterally. Achilles and quadriceps reflexes are 1+. Sensation is intact to light touch L3 to S1 bilaterally. She has no clonus. Hip range of motion painless. X-rays: 2 views of the lumbar spine to include AP and lateral which were obtained in the office today reveal spinal fusion with instrumentation from L2-S1 with the hardware to be intact.     Impression:  Degenerative scoliosis  Right greater than left trochanteric bursitis      She is going to try physical therapy and return as needed
Yes

## 2022-10-13 ENCOUNTER — HOSPITAL ENCOUNTER (OUTPATIENT)
Dept: WOMENS IMAGING | Age: 67
Discharge: HOME | End: 2022-10-13
Payer: MEDICARE

## 2022-10-13 DIAGNOSIS — Z12.31 BREAST CANCER SCREENING BY MAMMOGRAM: ICD-10-CM

## 2022-10-13 PROCEDURE — 77063 BREAST TOMOSYNTHESIS BI: CPT

## 2022-12-20 ENCOUNTER — OFFICE VISIT (OUTPATIENT)
Dept: ORTHOPEDIC SURGERY | Age: 67
End: 2022-12-20
Payer: MEDICARE

## 2022-12-20 VITALS — HEIGHT: 63 IN | BODY MASS INDEX: 18.61 KG/M2 | WEIGHT: 105 LBS

## 2022-12-20 DIAGNOSIS — M48.062 SPINAL STENOSIS OF LUMBAR REGION WITH NEUROGENIC CLAUDICATION: ICD-10-CM

## 2022-12-20 DIAGNOSIS — Z98.1 S/P LUMBAR FUSION: Primary | ICD-10-CM

## 2022-12-20 PROCEDURE — 3017F COLORECTAL CA SCREEN DOC REV: CPT | Performed by: ORTHOPAEDIC SURGERY

## 2022-12-20 PROCEDURE — 1036F TOBACCO NON-USER: CPT | Performed by: ORTHOPAEDIC SURGERY

## 2022-12-20 PROCEDURE — G8484 FLU IMMUNIZE NO ADMIN: HCPCS | Performed by: ORTHOPAEDIC SURGERY

## 2022-12-20 PROCEDURE — G8420 CALC BMI NORM PARAMETERS: HCPCS | Performed by: ORTHOPAEDIC SURGERY

## 2022-12-20 PROCEDURE — 99213 OFFICE O/P EST LOW 20 MIN: CPT | Performed by: ORTHOPAEDIC SURGERY

## 2022-12-20 PROCEDURE — 1123F ACP DISCUSS/DSCN MKR DOCD: CPT | Performed by: ORTHOPAEDIC SURGERY

## 2022-12-20 PROCEDURE — 1090F PRES/ABSN URINE INCON ASSESS: CPT | Performed by: ORTHOPAEDIC SURGERY

## 2022-12-20 PROCEDURE — G8399 PT W/DXA RESULTS DOCUMENT: HCPCS | Performed by: ORTHOPAEDIC SURGERY

## 2022-12-20 PROCEDURE — G8427 DOCREV CUR MEDS BY ELIG CLIN: HCPCS | Performed by: ORTHOPAEDIC SURGERY

## 2022-12-20 RX ORDER — GABAPENTIN 300 MG/1
300 CAPSULE ORAL 4 TIMES DAILY
Qty: 120 CAPSULE | Refills: 0 | Status: SHIPPED | OUTPATIENT
Start: 2022-12-20 | End: 2023-01-19

## 2022-12-20 NOTE — PROGRESS NOTES
New Patient: LUMBAR SPINE    Referring Provider:  No ref. provider found    CHIEF COMPLAINT:    Chief Complaint   Patient presents with    Back Problem     Alif 2-11-21, left side leg pain SI pain       HISTORY OF PRESENT ILLNESS:    Ms. Greer Granados  is almost 2 years status post anterior posterior fusion L2-S1 for degenerative scoliosis and adjacent level degenerative disc disease. She reports a 5-week history of increased pain over her left SI joint and distal anterior thighs. 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    Depression     prescribed after death of spouse--5 years ago    HBP (high blood pressure)     History of blood transfusion     in child bearing years    PONV (postoperative nausea and vomiting)     With one surgery    S/P insertion of spinal cord stimulator     Spinal cord stimulator status     lower back--instructed to bring ID card DOS/pt states usually keeps it off      Past Surgical History:     Past Surgical History:   Procedure Laterality Date    BACK SURGERY  2007    lumbar fusion and cage    BACK SURGERY  10/03/2016     Right C5-6 and C6-7 laminotomy, partial facetectomy and foraminotomy     Microdissection using the operating room microscope.      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 Conchis Petit (CERVIX STATUS UNKNOWN)      LUMBAR FUSION N/A 2/11/2021    ANTERIOR LUMBAR INTERBODY FUSION L2-3, L3-4, L5-S1 WITH MEDTRONIC CAGE AND INFUSE performed by Krystyna Louie MD at [address] [place] [address] Road 2/11/2021    POSTERIOR LAMINECTOMY AND FUSION L2-S1 WITH MEDTRONIC RODS AND SCREWS WITH REMOVAL OF CLARITA AND SCREW L4-5 USING MEDTRONICS, EVOKES AND O-ARM  CPT CODE - 01713-03, [id number], 18991-16, 64563-01, 83175, 02516, 43498, 36677, 99697-35, 64399, 96816, 57007 performed by Krystyna Louie MD at [place] [id number]     Current Medications:     Current Outpatient Medications:     gabapentin (NEURONTIN) 300 MG capsule, TAKE ONE CAPSULE BY MOUTH THREE TIMES A DAY, Disp: 90 capsule, Rfl: 0    ondansetron (ZOFRAN) 4 MG tablet, Take 1 tablet by mouth 3 times daily as needed for Nausea or Vomiting, Disp: 15 tablet, Rfl: 0    docusate sodium (COLACE) 100 MG capsule, Take 1 capsule by mouth daily as needed for Constipation, Disp: 30 capsule, Rfl: 0    amLODIPine (NORVASC) 10 MG tablet, Take 10 mg by mouth daily, Disp: , Rfl:     zinc 50 MG CAPS, Take by mouth, Disp: , Rfl:     Ascorbic Acid (VITAMIN C) 1000 MG tablet, Take 1,000 mg by mouth daily, Disp: , Rfl:     Cholecalciferol (VITAMIN D3) 50 MCG (2000 UT) CAPS, Take by mouth, Disp: , Rfl:     Magnesium 500 MG CAPS, Take by mouth 2 times daily, Disp: , Rfl:     buPROPion (WELLBUTRIN XL) 300 MG extended release tablet, Take 300 mg by mouth every morning, Disp: , Rfl:     lisinopril (PRINIVIL;ZESTRIL) 20 MG tablet, Take 40 mg by mouth daily , Disp: , Rfl:   Allergies:  Citalopram and Fluoxetine  Social History:    reports that she has never smoked. She has never used smokeless tobacco. She reports that she does not drink alcohol and does not use drugs.   Family History:   Family History   Problem Relation Age of Onset    COPD Mother     High Blood Pressure Other     Other Other         COPD    High Blood Pressure Father        REVIEW OF SYSTEMS: Full ROS noted & scanned   CONSTITUTIONAL: Denies unexplained weight loss, fevers, chills or fatigue  NEUROLOGICAL: Denies unsteady gait or progressive weakness  MUSCULOSKELETAL: Denies joint swelling or redness  PSYCHOLOGICAL: Denies anxiety, depression   SKIN: Denies skin changes, delayed healing, rash, itching   HEMATOLOGIC: Denies easy bleeding or bruising  ENDOCRINE: Denies excessive thirst, urination, heat/cold  RESPIRATORY: Denies current dyspnea, cough  GI: Denies nausea, vomiting, diarrhea   : Denies bowel or bladder issues      PHYSICAL EXAM:    Vitals: Height 5' 2.99\" (1.6 m), weight 105 lb (47.6 kg), not currently breastfeeding. GENERAL EXAM:  General Apparence: Patient is adequately groomed with no evidence of malnutrition. Orientation: The patient is oriented to time, place and person. Mood & Affect:The patient's mood and affect are appropriate. Vascular: Examination reveals no swelling tenderness in upper or lower extremities. Good capillary refill. Lymphatic: The lymphatic examination bilaterally reveals all areas to be without enlargement or induration  Sensation: Sensation is intact without deficit  Coordination/Balance: Good coordination     LUMBAR/SACRAL EXAMINATION:  Inspection: Local inspection shows no step-off or bruising. Lumbar alignment is normal.  Sagittal and Coronal balance is neutral.      Palpation:   No evidence of tenderness at the midline. No tenderness bilaterally at the paraspinal or trochanters. There is no step-off or paraspinal spasm. Range of Motion: Lumbar flexion, extension and rotation are mildly limited due to pain. Strength:   Strength testing is 5/5 in all muscle groups tested. Special Tests:   Straight leg raise and crossed SLR negative. Leg length and pelvis level. Skin: There are no rashes, ulcerations or lesions. Reflexes: Reflexes are symmetrically 2+ at the patellar and ankle tendons.   Clonus absent bilaterally at the feet. Gait & station: normal, patient ambulates without assistance    Additional Examinations:   RIGHT LOWER EXTREMITY: Inspection/examination of the right lower extremity does not show any tenderness, deformity or injury. Range of motion is unremarkable. There is no gross instability. There are no rashes, ulcerations or lesions. Strength and tone are normal.    LEFT LOWER EXTREMITY:  Inspection/examination of the left lower extremity does not show any tenderness, deformity or injury. Range of motion is unremarkable. There is no gross instability. There are no rashes, ulcerations or lesions. Strength and tone are normal.    Diagnostic Testing:    I reviewed AP and lateral x-rays of the lumbar spine were obtained in the office today. They show advanced degeneration L1-L2 just rostral to her fusion    Impression:   Degenerative disc disease    Plan:    Discussed treatment options including observation, physical therapy, epidural injection, and additional imaging.  She would like to proceed with Karson core strengthening exercises and gabapentin

## 2023-01-16 RX ORDER — GABAPENTIN 300 MG/1
300 CAPSULE ORAL 4 TIMES DAILY
Qty: 90 CAPSULE | Refills: 2 | Status: SHIPPED | OUTPATIENT
Start: 2023-01-16 | End: 2023-03-25

## 2023-01-16 RX ORDER — GABAPENTIN 300 MG/1
CAPSULE ORAL
Qty: 120 CAPSULE | Refills: 0 | OUTPATIENT
Start: 2023-01-16

## 2023-01-16 RX ORDER — GABAPENTIN 300 MG/1
300 CAPSULE ORAL 4 TIMES DAILY
Qty: 120 CAPSULE | Refills: 1 | Status: SHIPPED | OUTPATIENT
Start: 2023-01-16 | End: 2023-03-17

## 2023-10-19 ENCOUNTER — HOSPITAL ENCOUNTER (OUTPATIENT)
Dept: WOMENS IMAGING | Age: 68
Discharge: HOME | End: 2023-10-19
Payer: MEDICARE

## 2023-10-19 VITALS — BODY MASS INDEX: 22.08 KG/M2 | HEIGHT: 62 IN | WEIGHT: 120 LBS

## 2023-10-19 DIAGNOSIS — Z12.31 VISIT FOR SCREENING MAMMOGRAM: ICD-10-CM

## 2023-10-19 PROCEDURE — 77067 SCR MAMMO BI INCL CAD: CPT

## 2024-11-07 ENCOUNTER — HOSPITAL ENCOUNTER (OUTPATIENT)
Dept: WOMENS IMAGING | Age: 69
Discharge: HOME | End: 2024-11-07
Payer: MEDICARE

## 2024-11-07 VITALS — WEIGHT: 115 LBS | BODY MASS INDEX: 21.71 KG/M2 | HEIGHT: 61 IN

## 2024-11-07 DIAGNOSIS — Z12.31 BREAST CANCER SCREENING BY MAMMOGRAM: ICD-10-CM

## 2024-11-07 PROCEDURE — 77063 BREAST TOMOSYNTHESIS BI: CPT

## (undated) DEVICE — SUTURE VCRL + SZ 0 L18IN ABSRB UD L36MM CT-1 1/2 CIR VCP840D

## (undated) DEVICE — COVER LT HNDL PLAS RIG 2 PER PK

## (undated) DEVICE — SUTURE VCRL + SZ 3-0 L18IN ABSRB UD SH 1/2 CIR TAPERCUT NDL VCP864D

## (undated) DEVICE — SUTURE PDS II SZ 0 L60IN ABSRB VLT L48MM CTX 1/2 CIR Z990G

## (undated) DEVICE — 3M™ IOBAN™ 2 ANTIMICROBIAL INCISE DRAPE 6650EZ: Brand: IOBAN™ 2

## (undated) DEVICE — COVER,TABLE,HEAVY DUTY,50"X90",STRL: Brand: MEDLINE

## (undated) DEVICE — BLOOD TRANSFUSION FILTER: Brand: HAEMONETICS

## (undated) DEVICE — GOWN SIRUS NONREIN XL W/TWL: Brand: MEDLINE INDUSTRIES, INC.

## (undated) DEVICE — SUTURE VCRL + SZ 2-0 L27IN ABSRB CLR CT-1 1/2 CIR TAPERCUT VCP259H

## (undated) DEVICE — SUTURE NONABSORBABLE MONOFILAMENT 6-0 C-1 1X30 IN PROLENE 8706H

## (undated) DEVICE — SENSOR PLSE OXMTR AD CBL L36IN ADH FRM FIT SPO2 DISP

## (undated) DEVICE — GLOVE ORANGE PI 7 1/2   MSG9075

## (undated) DEVICE — SUTURE VCRL + SZ 3-0 L27IN ABSRB UD L26MM SH 1/2 CIR VCP416H

## (undated) DEVICE — TABLE COVER: Brand: CONVERTORS

## (undated) DEVICE — INTENDED FOR TISSUE SEPARATION, AND OTHER PROCEDURES THAT REQUIRE A SHARP SURGICAL BLADE TO PUNCTURE OR CUT.: Brand: BARD-PARKER SAFETY BLADES SIZE 10, STERILE

## (undated) DEVICE — GLOVE SURG SZ 8 L11.77IN FNGR THK9.8MIL STRW LTX POLYMER

## (undated) DEVICE — TOTAL TRAY, DB, 100% SILI FOLEY, 16FR 10: Brand: MEDLINE

## (undated) DEVICE — SOLUTION IV IRRIG POUR BRL 0.9% SODIUM CHL 2F7124

## (undated) DEVICE — COVER,MAYO STAND,XL,STERILE: Brand: MEDLINE

## (undated) DEVICE — SUTURE PROL SZ 3-0 L48IN NONABSORBABLE BLU SH L26MM 1/2 CIR 8534H

## (undated) DEVICE — GAUZE,SPONGE,4"X4",8PLY,STRL,LF,10/TRAY: Brand: MEDLINE

## (undated) DEVICE — SUTURE MCRYL + SZ 4-0 L18IN ABSRB UD L19MM PS-2 3/8 CIR MCP496G

## (undated) DEVICE — BLADE ES L6IN ELASTOMERIC COAT INSUL DURABLE BEND UPTO

## (undated) DEVICE — SPHERE EYE 1 MRK GUIDANCE PASS STEALTHSTATION 1PK/EA

## (undated) DEVICE — PERFUSION SET 120 MM

## (undated) DEVICE — MEDI-VAC NON-CONDUCTIVE SUCTION TUBING: Brand: CARDINAL HEALTH

## (undated) DEVICE — SUTURE PROL SZ 5-0 L36IN NONABSORBABLE BLU L13MM C-1 3/8 8720H

## (undated) DEVICE — KIT JACK TBL PT CARE

## (undated) DEVICE — 3M™ TEGADERM™ TRANSPARENT FILM DRESSING FRAME STYLE WITH BORDER, 1616, 4 IN X 4-3/4 IN (10 CM X 12 CM), 50/CT 4CT/CASE: Brand: 3M™ TEGADERM™

## (undated) DEVICE — LOCATOR RAD PASS SPHR MRK NAVIGATION BALL DISP STLTH STN
Type: IMPLANTABLE DEVICE | Status: NON-FUNCTIONAL
Removed: 2021-02-11

## (undated) DEVICE — SPONGE LAP W18XL18IN WHT COT 4 PLY FLD STRUNG RADPQ DISP ST

## (undated) DEVICE — 1-PIECE DRAINABLE OSTOMY POUCH, SOFTFLEX: Brand: PREMIER

## (undated) DEVICE — APPLIER LIG CLP M L11IN TI STR RNG HNDL FOR 20 CLP DISP

## (undated) DEVICE — SHEET,DRAPE,53X77,STERILE: Brand: MEDLINE

## (undated) DEVICE — AGENT HEMSTAT W4XL8IN OXIDIZED REGENERATED CELOS ABSRB

## (undated) DEVICE — SUTURE VCRL SZ 0 L36IN ABSRB UD CT-1 L36MM 1/2 CIR TAPR PNT VCP946H

## (undated) DEVICE — DRAPE C ARM W46XL120IN XLN

## (undated) DEVICE — SUTURE PERMAHAND SZ 2-0 L30IN NONABSORBABLE BLK SILK W/O A305H

## (undated) DEVICE — TOOL 14MH30 LEGEND 14CM 3MM: Brand: MIDAS REX ™

## (undated) DEVICE — Z INACTIVE NO SUPPLIER SOLUTIONIRRIG 3000ML 0.9% SOD CHL FLX CONT [79720808] [HOSPIRA WORLDWIDE INC]

## (undated) DEVICE — Device

## (undated) DEVICE — CRADLE POS PRONE 24 X 5 X 3 IN ARM N COMPR NO CVR FOAM DISP

## (undated) DEVICE — SUTURE NONABSORBABLE MONOFILAMENT 4-0 RB-1 36 IN BLU PROLENE 8557H

## (undated) DEVICE — AUTOTRANSFUSION BOWL SET 125 CC XTRA

## (undated) DEVICE — DRAPE,TOP,ARM COVERS,106X59,STERILE: Brand: MEDLINE

## (undated) DEVICE — STRAP POS W5XL72IN FOAM KNEE AND BODY HK LOOP CLSR DISP

## (undated) DEVICE — LIPIGUARD SB REINFUSION FILTER FOR SALVAGED BLOOD: Brand: LIPIGUARD SB